# Patient Record
Sex: FEMALE | Race: WHITE | NOT HISPANIC OR LATINO | Employment: PART TIME | URBAN - METROPOLITAN AREA
[De-identification: names, ages, dates, MRNs, and addresses within clinical notes are randomized per-mention and may not be internally consistent; named-entity substitution may affect disease eponyms.]

---

## 2017-10-06 RX ORDER — FLUTICASONE PROPIONATE 50 MCG
2 SPRAY, SUSPENSION (ML) NASAL DAILY
COMMUNITY
End: 2017-10-18

## 2017-10-18 ENCOUNTER — TELEPHONE (OUTPATIENT)
Dept: FAMILY MEDICINE | Facility: CLINIC | Age: 57
End: 2017-10-18

## 2017-10-18 ENCOUNTER — OFFICE VISIT (OUTPATIENT)
Dept: FAMILY MEDICINE | Facility: CLINIC | Age: 57
End: 2017-10-18
Payer: COMMERCIAL

## 2017-10-18 VITALS
BODY MASS INDEX: 31.84 KG/M2 | SYSTOLIC BLOOD PRESSURE: 130 MMHG | DIASTOLIC BLOOD PRESSURE: 80 MMHG | OXYGEN SATURATION: 98 % | HEART RATE: 74 BPM | WEIGHT: 215 LBS | HEIGHT: 69 IN

## 2017-10-18 DIAGNOSIS — S90.811A ABRASION OF RIGHT FOOT, INITIAL ENCOUNTER: ICD-10-CM

## 2017-10-18 DIAGNOSIS — Z23 NEED FOR TETANUS BOOSTER: ICD-10-CM

## 2017-10-18 DIAGNOSIS — Z01.818 PRE-OPERATIVE CLEARANCE: Primary | ICD-10-CM

## 2017-10-18 DIAGNOSIS — M65.9: Primary | ICD-10-CM

## 2017-10-18 DIAGNOSIS — Z23 NEED FOR INFLUENZA VACCINATION: ICD-10-CM

## 2017-10-18 PROCEDURE — 90472 IMMUNIZATION ADMIN EACH ADD: CPT | Mod: ,,, | Performed by: FAMILY MEDICINE

## 2017-10-18 PROCEDURE — 90471 IMMUNIZATION ADMIN: CPT | Mod: ,,, | Performed by: FAMILY MEDICINE

## 2017-10-18 PROCEDURE — 90715 TDAP VACCINE 7 YRS/> IM: CPT | Mod: ,,, | Performed by: FAMILY MEDICINE

## 2017-10-18 PROCEDURE — 90686 IIV4 VACC NO PRSV 0.5 ML IM: CPT | Mod: ,,, | Performed by: FAMILY MEDICINE

## 2017-10-18 PROCEDURE — 99213 OFFICE O/P EST LOW 20 MIN: CPT | Mod: 25,,, | Performed by: FAMILY MEDICINE

## 2017-10-18 RX ORDER — MUPIROCIN 20 MG/G
OINTMENT TOPICAL 3 TIMES DAILY
Qty: 1 TUBE | Refills: 1 | Status: SHIPPED | OUTPATIENT
Start: 2017-10-18 | End: 2017-11-15

## 2017-10-18 NOTE — PROGRESS NOTES
Subjective:       Patient ID: Paula Hernadez is a 57 y.o. female.    Chief Complaint: thumb pain (both thumbs); Foot Swelling (rt foot); tdap; and Flu Vaccine    Months of bolateral dorsal thumb pain, no trauma, worse with flexion. Hit right foot with , slow to heal with neosporin      Review of Systems   Constitutional: Negative for appetite change, chills, diaphoresis, fatigue and fever.   HENT: Negative for congestion, ear pain, hearing loss, sore throat and trouble swallowing.    Eyes: Negative for photophobia, pain and visual disturbance.   Respiratory: Negative for cough, chest tightness and shortness of breath.    Cardiovascular: Negative for chest pain, palpitations and leg swelling.   Gastrointestinal: Negative for abdominal pain, blood in stool, constipation, diarrhea, nausea and vomiting.   Endocrine: Negative for cold intolerance and heat intolerance.   Genitourinary: Negative for difficulty urinating, flank pain, pelvic pain and vaginal pain.   Musculoskeletal: Negative for arthralgias, joint swelling, myalgias and neck pain.   Skin: Negative for rash.   Allergic/Immunologic: Negative for immunocompromised state.   Neurological: Negative for dizziness, weakness, light-headedness and headaches.   Hematological: Negative for adenopathy. Does not bruise/bleed easily.   Psychiatric/Behavioral: Negative for confusion, self-injury and suicidal ideas.       Past Medical History:   Diagnosis Date    Chronic insomnia     Neuromuscular disorder     Seasonal allergies     Seborrheic keratoses       Past Surgical History:   Procedure Laterality Date    SHOULDER SURGERY Left     TONSILLECTOMY         No family history on file.    Social History     Social History    Marital status:      Spouse name: N/A    Number of children: N/A    Years of education: N/A     Social History Main Topics    Smoking status: Former Smoker     Quit date: 10/6/2002    Smokeless tobacco: Never Used     "Alcohol use Yes    Drug use: No    Sexual activity: Yes     Other Topics Concern    None     Social History Narrative    None       Current Outpatient Prescriptions   Medication Sig Dispense Refill    mupirocin (BACTROBAN) 2 % ointment Apply topically 3 (three) times daily. 1 Tube 1     No current facility-administered medications for this visit.        Review of patient's allergies indicates:   Allergen Reactions    Codeine Nausea Only     Objective:    HPI     thumb pain    Additional comments: both thumbs           Foot Swelling    Additional comments: rt foot       Last edited by Hayley Barlow MA on 10/18/2017  1:40 PM. (History)      Blood pressure 130/80, pulse 74, height 5' 9" (1.753 m), weight 97.5 kg (215 lb), SpO2 98 %. Body mass index is 31.75 kg/m².   Physical Exam   Constitutional: She appears well-developed and well-nourished.   HENT:   Head: Normocephalic and atraumatic.   Musculoskeletal: She exhibits tenderness (positive finkelstein bilateral, no edema or erythema, no crepitus).        Feet:            Assessment:       1. Tenosynovitis of thigh    2. Abrasion of right foot, initial encounter    3. Need for influenza vaccination    4. Need for tetanus booster        Plan:       Paula was seen today for thumb pain, foot swelling, tdap and flu vaccine.    Diagnoses and all orders for this visit:    Tenosynovitis of thigh  Comments:  bilateral, splint and rest    Abrasion of right foot, initial encounter  -     mupirocin (BACTROBAN) 2 % ointment; Apply topically 3 (three) times daily.    Need for influenza vaccination  -     Influenza - Quadrivalent (3 years & older) (PF)    Need for tetanus booster  -     Tdap Vaccine         "

## 2017-10-18 NOTE — PATIENT INSTRUCTIONS
Tendonitis  A tendon is the thick fibrous cord that joins muscle to bone and allows joints to move. When a tendon becomes inflamed, it is called tendonitis. This can occur from overuse, injury, or infection. This usually involves the shoulders, forearm, wrist, hands and foot. Symptoms include pain, swelling and tenderness to the touch. Moving the joint increases the pain.  It takes 4 to 6 weeks for tendonitis to heal. It is treated by preventing motion of the tendon with a splint or brace and the use of anti-inflammatory medicine.  Home care  · Some people find relief with ice packs. These can be crushed or cubed ice in a plastic bag or a bag of frozen vegetables wrapped in a thin towel. Other people get better relief with heat. This can include a hot shower, hot bath, or a moist towel warmed in a microwave. Try each and use the method that feels best, for 15 to 20 minutes several times a day.  · Rest the inflamed joint and protect it from movement.  · You may use over-the-counter ibuprofen or naproxen to treat pain and inflammation, unless another medicine was prescribed. If you can't take these medicines, acetaminophen may help with the pain, but does not treat inflammation. If you have chronic liver or kidney disease or ever had a stomach ulcer or gastrointestinal bleeding, talk with your doctor before using these medicines.  · As your symptoms improve, begin gradual motion at the involved joint.  Follow-up care  Follow up with your healthcare provider if you are not improving after 5 days of treatment.  When to seek medical advice  Call your healthcare provider right away if any of these occur:  · Redness over the painful area  · Increasing pain or swelling at the joint  · Fever (1 degree above your normal temperature) lasting 24 to 48 hours Or, whatever your healthcare provider told you to report based on your condition  Date Last Reviewed: 11/21/2015  © 8483-4138 [employer]. [address] [place] [address]  Road, [place] [address]. All rights reserved. This information is not intended as a substitute for professional medical care. Always follow your healthcare professional's instructions.

## 2017-11-09 ENCOUNTER — TELEPHONE (OUTPATIENT)
Dept: FAMILY MEDICINE | Facility: CLINIC | Age: 57
End: 2017-11-09

## 2017-11-09 LAB
APPEARANCE UR: CLEAR
APTT PPP: 26 SEC (ref 24–33)
BACTERIA #/AREA URNS HPF: ABNORMAL /[HPF]
BASOPHILS # BLD AUTO: 0 X10E3/UL (ref 0–0.2)
BASOPHILS NFR BLD AUTO: 1 %
BILIRUB UR QL STRIP: NEGATIVE
BUN SERPL-MCNC: 14 MG/DL (ref 6–24)
BUN/CREAT SERPL: 16 (ref 9–23)
CALCIUM SERPL-MCNC: 9.3 MG/DL (ref 8.7–10.2)
CASTS URNS MICRO: ABNORMAL
CASTS URNS QL MICRO: PRESENT /LPF
CHLORIDE SERPL-SCNC: 102 MMOL/L (ref 96–106)
CO2 SERPL-SCNC: 27 MMOL/L (ref 18–29)
COLOR UR: YELLOW
CREAT SERPL-MCNC: 0.86 MG/DL (ref 0.57–1)
EOSINOPHIL # BLD AUTO: 0.3 X10E3/UL (ref 0–0.4)
EOSINOPHIL NFR BLD AUTO: 6 %
EPI CELLS #/AREA URNS HPF: ABNORMAL /HPF
ERYTHROCYTE [DISTWIDTH] IN BLOOD BY AUTOMATED COUNT: 14.9 % (ref 12.3–15.4)
GFR SERPLBLD CREATININE-BSD FMLA CKD-EPI: 75 ML/MIN/1.73
GFR SERPLBLD CREATININE-BSD FMLA CKD-EPI: 87 ML/MIN/1.73
GLUCOSE SERPL-MCNC: 89 MG/DL (ref 65–99)
GLUCOSE UR QL: NEGATIVE
HCT VFR BLD AUTO: 41.4 % (ref 34–46.6)
HGB BLD-MCNC: 13.5 G/DL (ref 11.1–15.9)
HGB UR QL STRIP: NEGATIVE
IMM GRANULOCYTES # BLD: 0 X10E3/UL (ref 0–0.1)
IMM GRANULOCYTES NFR BLD: 0 %
INR PPP: 1 (ref 0.8–1.2)
KETONES UR QL STRIP: NEGATIVE
LEUKOCYTE ESTERASE UR QL STRIP: NEGATIVE
LYMPHOCYTES # BLD AUTO: 1.3 X10E3/UL (ref 0.7–3.1)
LYMPHOCYTES NFR BLD AUTO: 31 %
MCH RBC QN AUTO: 29.3 PG (ref 26.6–33)
MCHC RBC AUTO-ENTMCNC: 32.6 G/DL (ref 31.5–35.7)
MCV RBC AUTO: 90 FL (ref 79–97)
MICRO URNS: ABNORMAL
MICRO URNS: ABNORMAL
MONOCYTES # BLD AUTO: 0.4 X10E3/UL (ref 0.1–0.9)
MONOCYTES NFR BLD AUTO: 8 %
MUCOUS THREADS URNS QL MICRO: PRESENT
NEUTROPHILS # BLD AUTO: 2.3 X10E3/UL (ref 1.4–7)
NEUTROPHILS NFR BLD AUTO: 54 %
NITRITE UR QL STRIP: NEGATIVE
PH UR STRIP: 8 [PH] (ref 5–7.5)
PLATELET # BLD AUTO: 203 X10E3/UL (ref 150–379)
POTASSIUM SERPL-SCNC: 4.8 MMOL/L (ref 3.5–5.2)
PROT UR QL STRIP: ABNORMAL
PROTHROMBIN TIME: 10.3 SEC (ref 9.1–12)
RBC # BLD AUTO: 4.6 X10E6/UL (ref 3.77–5.28)
RBC #/AREA URNS HPF: ABNORMAL /HPF
SODIUM SERPL-SCNC: 142 MMOL/L (ref 134–144)
SP GR UR: 1.02 (ref 1–1.03)
URINALYSIS REFLEX: ABNORMAL
UROBILINOGEN UR STRIP-MCNC: 0.2 MG/DL (ref 0.2–1)
WBC # BLD AUTO: 4.3 X10E3/UL (ref 3.4–10.8)
WBC #/AREA URNS HPF: ABNORMAL /HPF

## 2017-11-09 NOTE — TELEPHONE ENCOUNTER
----- Message from Genoveva Mauro sent at 11/8/2017  9:02 AM CST -----  This patient is on your schedule for pre op on 11/15; FYI; do you have everything you need for this pre op? lm

## 2017-11-15 ENCOUNTER — OFFICE VISIT (OUTPATIENT)
Dept: FAMILY MEDICINE | Facility: CLINIC | Age: 57
End: 2017-11-15
Payer: COMMERCIAL

## 2017-11-15 VITALS
BODY MASS INDEX: 31.99 KG/M2 | HEART RATE: 68 BPM | HEIGHT: 69 IN | WEIGHT: 216 LBS | OXYGEN SATURATION: 98 % | SYSTOLIC BLOOD PRESSURE: 136 MMHG | DIASTOLIC BLOOD PRESSURE: 80 MMHG

## 2017-11-15 DIAGNOSIS — Z01.818 PREOP EXAMINATION: ICD-10-CM

## 2017-11-15 DIAGNOSIS — E65 ABDOMINAL PANNUS: Primary | ICD-10-CM

## 2017-11-15 PROCEDURE — 99213 OFFICE O/P EST LOW 20 MIN: CPT | Mod: ,,, | Performed by: FAMILY MEDICINE

## 2017-11-15 NOTE — PROGRESS NOTES
Subjective:       Patient ID: Paula Hernadez is a 57 y.o. female.    Chief Complaint: Pre-op Exam (tummy tuck)    28th, [place]      Review of Systems   Constitutional: Negative for appetite change, chills, diaphoresis, fatigue and fever.   HENT: Negative for congestion, ear pain, hearing loss, sore throat and trouble swallowing.    Eyes: Negative for photophobia, pain and visual disturbance.   Respiratory: Negative for cough, chest tightness and shortness of breath.    Cardiovascular: Negative for chest pain, palpitations and leg swelling.   Gastrointestinal: Negative for abdominal pain, blood in stool, constipation, diarrhea, nausea and vomiting.   Endocrine: Negative for cold intolerance and heat intolerance.   Genitourinary: Negative for difficulty urinating, flank pain, pelvic pain and vaginal pain.   Musculoskeletal: Negative for arthralgias and myalgias.   Skin: Negative for rash.   Allergic/Immunologic: Negative for immunocompromised state.   Neurological: Negative for dizziness, weakness, light-headedness and headaches.   Hematological: Negative for adenopathy. Does not bruise/bleed easily.   Psychiatric/Behavioral: Negative for confusion, self-injury and suicidal ideas.       Past Medical History:   Diagnosis Date    Chronic insomnia     Neuromuscular disorder     Seasonal allergies     Seborrheic keratoses       Past Surgical History:   Procedure Laterality Date    SHOULDER SURGERY Left     TONSILLECTOMY         No family history on file.    Social History     Social History    Marital status:      Spouse name: N/A    Number of children: N/A    Years of education: N/A     Social History Main Topics    Smoking status: Former Smoker     Quit date: 10/6/2002    Smokeless tobacco: Never Used    Alcohol use Yes    Drug use: No    Sexual activity: Yes     Other Topics Concern    None     Social History Narrative    None       No current outpatient prescriptions on file.     No current  "facility-administered medications for this visit.        Review of patient's allergies indicates:   Allergen Reactions    Codeine Nausea Only     Objective:    HPI     Pre-op Exam    Additional comments: tummy tuck       Last edited by Hayley Barlow MA on 11/15/2017  2:46 PM. (History)      Blood pressure 136/80, pulse 68, height 5' 9" (1.753 m), weight 98 kg (216 lb), SpO2 98 %. Body mass index is 31.9 kg/m².   Physical Exam   Constitutional: She is oriented to person, place, and time. She appears well-developed and well-nourished. She is cooperative. No distress.   HENT:   Head: Normocephalic and atraumatic.   Right Ear: Tympanic membrane normal.   Left Ear: Tympanic membrane normal.   Eyes: Conjunctivae, EOM and lids are normal. Pupils are equal, round, and reactive to light. Lids are everted and swept, no foreign bodies found. Right pupil is round and reactive. Left pupil is round and reactive.   Neck: Trachea normal and normal range of motion. Neck supple.   Cardiovascular: Normal rate, regular rhythm, S1 normal, S2 normal, normal heart sounds and intact distal pulses.    Pulmonary/Chest: Breath sounds normal.   Abdominal: Soft. Bowel sounds are normal. There is no rigidity and no guarding.   Musculoskeletal: Normal range of motion.   Lymphadenopathy:     She has no cervical adenopathy.     She has no axillary adenopathy.   Neurological: She is alert and oriented to person, place, and time.   Skin: Skin is warm and dry. Capillary refill takes less than 2 seconds.   Psychiatric: She has a normal mood and affect. Her behavior is normal. Judgment and thought content normal.   Nursing note and vitals reviewed.          Assessment:       1. Abdominal pannus    2. Preop examination        Plan:       Paula was seen today for pre-op exam.    Diagnoses and all orders for this visit:    Abdominal pannus    Preop examination  -     IN OFFICE EKG 12-LEAD (to Muse)  ekg nsr, labs reviewed, cleared for surgery, low " risk

## 2017-11-27 ENCOUNTER — TELEPHONE (OUTPATIENT)
Dept: FAMILY MEDICINE | Facility: CLINIC | Age: 57
End: 2017-11-27

## 2017-11-27 NOTE — TELEPHONE ENCOUNTER
----- Message from Genoveva Mauro sent at 11/27/2017  9:11 AM CST -----  Contact: Rod at Dr. Kanika West's office; [phone]/fax [phone]  Dr. West's office looking for pre op clearance documentation/  Pt is schedule for surgery tomorrow.  Please send.  Thanks.  lm

## 2018-01-10 ENCOUNTER — OFFICE VISIT (OUTPATIENT)
Dept: FAMILY MEDICINE | Facility: CLINIC | Age: 58
End: 2018-01-10
Payer: COMMERCIAL

## 2018-01-10 VITALS
TEMPERATURE: 98 F | RESPIRATION RATE: 18 BRPM | OXYGEN SATURATION: 98 % | WEIGHT: 212.38 LBS | SYSTOLIC BLOOD PRESSURE: 118 MMHG | HEART RATE: 66 BPM | HEIGHT: 69 IN | DIASTOLIC BLOOD PRESSURE: 72 MMHG | BODY MASS INDEX: 31.45 KG/M2

## 2018-01-10 DIAGNOSIS — J30.2 ACUTE SEASONAL ALLERGIC RHINITIS, UNSPECIFIED TRIGGER: ICD-10-CM

## 2018-01-10 DIAGNOSIS — K21.9 GASTROESOPHAGEAL REFLUX DISEASE WITHOUT ESOPHAGITIS: Primary | ICD-10-CM

## 2018-01-10 PROBLEM — G62.9 NEUROPATHY: Status: ACTIVE | Noted: 2018-01-10

## 2018-01-10 PROBLEM — G47.00 INSOMNIA, PERSISTENT: Status: ACTIVE | Noted: 2018-01-10

## 2018-01-10 PROBLEM — L82.1 BASAL CELL PAPILLOMA: Status: ACTIVE | Noted: 2018-01-10

## 2018-01-10 PROBLEM — J30.9 ALLERGIC RHINITIS: Status: ACTIVE | Noted: 2018-01-10

## 2018-01-10 PROCEDURE — 99213 OFFICE O/P EST LOW 20 MIN: CPT | Mod: ,,, | Performed by: NURSE PRACTITIONER

## 2018-01-10 RX ORDER — FLUTICASONE PROPIONATE 50 MCG
2 SPRAY, SUSPENSION (ML) NASAL DAILY
Qty: 1 BOTTLE | Refills: 3 | Status: SHIPPED | OUTPATIENT
Start: 2018-01-10 | End: 2018-05-23 | Stop reason: ALTCHOICE

## 2018-01-10 RX ORDER — OMEPRAZOLE 20 MG/1
20 CAPSULE, DELAYED RELEASE ORAL DAILY
Qty: 30 CAPSULE | Refills: 0 | Status: SHIPPED | OUTPATIENT
Start: 2018-01-10 | End: 2018-02-20 | Stop reason: SDUPTHER

## 2018-01-10 NOTE — PATIENT INSTRUCTIONS
Discharge Instructions for Gastroesophageal Reflux Disease (GERD)  Gastroesophageal reflux disease (GERD) is a backflow of acid from the stomach into the swallowing tube (esophagus).  Home care  These home care steps can help you manage GERD:  · Maintain a healthy weight. Get help to lose any extra pounds.  · Avoid lying down after meals.  · Avoid eating late at night.  · Elevate the head of your bed by 6 inches. You can do this by placing wooden blocks or bed risers under the head of your bed.  · Avoid wearing tight-fitting clothes.  · Avoid foods that might irritate your stomach, such as the following:  ¨ Alcohol  ¨ Fat  ¨ Chocolate  ¨ Caffeine  ¨ Spearmint or peppermint  · Talk to your healthcare provider if you are taking any of the following medicines. These medicines can make GERD symptoms worse:  ¨ Calcium channel blockers  ¨ Theophylline  ¨ Anticholinergic medicines, such as oxybutynin and benzatropine  · Begin an exercise program. Ask your healthcare provider how to get started. You can benefit from simple activities, such as walking or gardening.  · Break the smoking habit. Enroll in a stop-smoking program to improve your chances of success.  · Limit alcohol intake to no more than 2 drinks a day.  · Take your medicines exactly as directed. Dont skip doses.  · Avoid over-the-counter nonsteroidal anti-inflammatory medicines, such as aspirin and ibuprofen, unless recommended by your healthcare provider for certain conditions.   · If possible, avoid nitrates (heart medicines, such as nitroglycerin and isosorbide dinitrate ).  Follow-up care  Make a follow-up appointment as directed by our staff.     When to call the healthcare provider  Call your healthcare provider immediately if you have any of the following:  · Trouble swallowing  · Pain when swallowing  · Feeling of food caught in your chest or throat  · Pain in the neck, chest, or back  · Heartburn that causes you to vomit  · Vomiting blood  · Black or  tarry stools (from digested blood)  · More saliva (watering of the mouth) than usual  · Weight loss of more than 3% to 5% of your total body weight in a month  · Hoarseness or sore throat that wont go away  · Choking, coughing, or wheezing   Date Last Reviewed: 7/1/2016  © 8394-3208 [employer]. [address] [place] [address], [place] [address]. All rights reserved. This information is not intended as a substitute for professional medical care. Always follow your healthcare professional's instructions.

## 2018-01-10 NOTE — PROGRESS NOTES
SUBJECTIVE:   HPI:  Sore Throat; nasal drip; and Nasal Congestion    C/o sore throat, belching and throat clearing for over a week    Sore Throat    This is a new problem. The current episode started 1 to 4 weeks ago. The problem has been unchanged. Neither side of throat is experiencing more pain than the other. There has been no fever. The patient is experiencing no pain. Pertinent negatives include no ear discharge, stridor, trouble swallowing or vomiting.       (Not in a hospital admission)  Review of patient's allergies indicates:   Allergen Reactions    Codeine Nausea Only     No current outpatient prescriptions on file prior to visit.     No current facility-administered medications on file prior to visit.      Past Medical History:   Diagnosis Date    Chronic insomnia     Neuromuscular disorder     Seasonal allergies     Seborrheic keratoses      Past Surgical History:   Procedure Laterality Date    COSMETIC SURGERY      SHOULDER SURGERY Left     TONSILLECTOMY       History reviewed. No pertinent family history.  Social History   Substance Use Topics    Smoking status: Former Smoker     Quit date: 10/6/2002    Smokeless tobacco: Never Used    Alcohol use Yes      Health Maintenance Topics with due status: Not Due       Topic Last Completion Date    Colonoscopy 04/28/2010    Lipid Panel 05/13/2015    Pap Smear with HPV Cotest 06/20/2017    Mammogram 06/26/2017    TETANUS VACCINE 10/18/2017     Immunization History   Administered Date(s) Administered    Influenza - Quadrivalent - PF 10/18/2017    Influenza - Trivalent (ADULT) 09/26/2013    Tdap 10/18/2017       Review of Systems   Constitutional: Negative for appetite change, chills, diaphoresis and unexpected weight change.   HENT: Positive for sore throat. Negative for ear discharge, hearing loss, trouble swallowing and voice change.    Eyes: Negative for photophobia and pain.   Respiratory: Negative for chest tightness and stridor.   "  Cardiovascular: Negative for chest pain.   Gastrointestinal: Negative for blood in stool and vomiting.   Endocrine: Negative for cold intolerance and heat intolerance.   Genitourinary: Negative for difficulty urinating and flank pain.   Musculoskeletal: Negative for joint swelling and neck stiffness.   Skin: Negative for pallor.   Neurological: Negative for speech difficulty.   Hematological: Does not bruise/bleed easily.   Psychiatric/Behavioral: Negative for confusion.      OBJECTIVE:      Vitals:    01/10/18 1337   BP: 118/72   Pulse: 66   Resp: 18   Temp: 98.3 °F (36.8 °C)   TempSrc: Oral   SpO2: 98%   Weight: 96.3 kg (212 lb 6.4 oz)   Height: 5' 9" (1.753 m)     Physical Exam   Constitutional: She is oriented to person, place, and time. She appears well-developed and well-nourished.   HENT:   Head: Atraumatic.   Right Ear: Tympanic membrane normal.   Left Ear: Tympanic membrane normal.   Nose: Mucosal edema: turbinates erythematous and swollen.   Mouth/Throat: Uvula is midline. Posterior oropharyngeal erythema present. No tonsillar exudate.   Eyes: Conjunctivae are normal.   Neck: Neck supple.   Cardiovascular: Normal rate, regular rhythm, normal heart sounds and intact distal pulses.    Pulmonary/Chest: Effort normal and breath sounds normal. She has no wheezes. She has no rhonchi. She has no rales.   Abdominal: Soft. Bowel sounds are normal. She exhibits no distension. There is no tenderness.   Musculoskeletal: Normal range of motion.   Lymphadenopathy:     She has no cervical adenopathy.   Neurological: She is alert and oriented to person, place, and time.   Skin: Skin is warm and dry. No rash noted.   Psychiatric: She has a normal mood and affect.   Nursing note and vitals reviewed.     Assessment:       1. Gastroesophageal reflux disease without esophagitis    2. Acute seasonal allergic rhinitis, unspecified trigger        Plan:       Gastroesophageal reflux disease without esophagitis  -     omeprazole " (PRILOSEC) 20 MG capsule; Take 1 capsule (20 mg total) by mouth once daily.  Dispense: 30 capsule; Refill: 0    Acute seasonal allergic rhinitis, unspecified trigger  -     fluticasone (FLONASE) 50 mcg/actuation nasal spray; 2 sprays (100 mcg total) by Each Nare route once daily.  Dispense: 1 Bottle; Refill: 3  -     dextromethorphan-guaifenesin  mg (MUCINEX DM)  mg per 12 hr tablet; Take 1 tablet by mouth 2 (two) times daily.  Dispense: 20 tablet; Refill: 0        Return if symptoms worsen or fail to improve.      1/10/2018 CATINA Gay, MARIELP

## 2018-02-20 DIAGNOSIS — K21.9 GASTROESOPHAGEAL REFLUX DISEASE WITHOUT ESOPHAGITIS: ICD-10-CM

## 2018-02-20 RX ORDER — OMEPRAZOLE 20 MG/1
20 CAPSULE, DELAYED RELEASE ORAL DAILY
Qty: 30 CAPSULE | Refills: 1 | Status: SHIPPED | OUTPATIENT
Start: 2018-02-20 | End: 2018-03-27 | Stop reason: SDUPTHER

## 2018-03-27 DIAGNOSIS — K21.9 GASTROESOPHAGEAL REFLUX DISEASE WITHOUT ESOPHAGITIS: ICD-10-CM

## 2018-03-27 RX ORDER — OMEPRAZOLE 20 MG/1
20 CAPSULE, DELAYED RELEASE ORAL DAILY
Qty: 90 CAPSULE | Refills: 1 | Status: SHIPPED | OUTPATIENT
Start: 2018-03-27 | End: 2018-09-27 | Stop reason: SDUPTHER

## 2018-05-23 ENCOUNTER — OFFICE VISIT (OUTPATIENT)
Dept: FAMILY MEDICINE | Facility: CLINIC | Age: 58
End: 2018-05-23
Payer: COMMERCIAL

## 2018-05-23 VITALS
TEMPERATURE: 99 F | DIASTOLIC BLOOD PRESSURE: 62 MMHG | BODY MASS INDEX: 31.99 KG/M2 | OXYGEN SATURATION: 99 % | SYSTOLIC BLOOD PRESSURE: 102 MMHG | HEART RATE: 82 BPM | WEIGHT: 216 LBS | HEIGHT: 69 IN

## 2018-05-23 DIAGNOSIS — Z00.00 PREVENTATIVE HEALTH CARE: ICD-10-CM

## 2018-05-23 DIAGNOSIS — J32.9 RHINOSINUSITIS: Primary | ICD-10-CM

## 2018-05-23 PROCEDURE — 3008F BODY MASS INDEX DOCD: CPT | Mod: ,,, | Performed by: NURSE PRACTITIONER

## 2018-05-23 PROCEDURE — 99213 OFFICE O/P EST LOW 20 MIN: CPT | Mod: ,,, | Performed by: NURSE PRACTITIONER

## 2018-05-23 RX ORDER — AZITHROMYCIN 250 MG/1
250 TABLET, FILM COATED ORAL DAILY
Qty: 6 TABLET | Refills: 0 | Status: SHIPPED | OUTPATIENT
Start: 2018-05-23 | End: 2018-06-28

## 2018-05-23 RX ORDER — BROMPHENIRAMINE MALEATE, PSEUDOEPHEDRINE HYDROCHLORIDE, AND DEXTROMETHORPHAN HYDROBROMIDE 2; 30; 10 MG/5ML; MG/5ML; MG/5ML
10 SYRUP ORAL EVERY 6 HOURS
Qty: 118 ML | Refills: 0 | Status: SHIPPED | OUTPATIENT
Start: 2018-05-23 | End: 2018-06-28

## 2018-05-23 NOTE — PATIENT INSTRUCTIONS

## 2018-05-25 ENCOUNTER — TELEPHONE (OUTPATIENT)
Dept: FAMILY MEDICINE | Facility: CLINIC | Age: 58
End: 2018-05-25

## 2018-05-25 NOTE — TELEPHONE ENCOUNTER
Pt said she woke up this morning with a mild rash on her chest. She is asking if this could be an allergic reaction to the new med.

## 2018-05-25 NOTE — TELEPHONE ENCOUNTER
JOE Ball LPN   Caller: Unspecified (Today,  9:59 AM)             Is she taking the cough med or did she start the antibiotic

## 2018-06-21 ENCOUNTER — TELEPHONE (OUTPATIENT)
Dept: FAMILY MEDICINE | Facility: CLINIC | Age: 58
End: 2018-06-21

## 2018-06-21 LAB
ALBUMIN SERPL-MCNC: 4.5 G/DL (ref 3.5–5.5)
ALBUMIN/GLOB SERPL: 1.8 {RATIO} (ref 1.2–2.2)
ALP SERPL-CCNC: 85 IU/L (ref 39–117)
ALT SERPL-CCNC: 16 IU/L (ref 0–32)
AST SERPL-CCNC: 30 IU/L (ref 0–40)
BILIRUB SERPL-MCNC: 0.5 MG/DL (ref 0–1.2)
BUN SERPL-MCNC: 17 MG/DL (ref 6–24)
BUN/CREAT SERPL: 22 (ref 9–23)
CALCIUM SERPL-MCNC: 9.4 MG/DL (ref 8.7–10.2)
CHLORIDE SERPL-SCNC: 101 MMOL/L (ref 96–106)
CHOLEST SERPL-MCNC: 200 MG/DL (ref 100–199)
CO2 SERPL-SCNC: 25 MMOL/L (ref 20–29)
CREAT SERPL-MCNC: 0.79 MG/DL (ref 0.57–1)
GFR SERPLBLD CREATININE-BSD FMLA CKD-EPI: 83 ML/MIN/1.73
GFR SERPLBLD CREATININE-BSD FMLA CKD-EPI: 95 ML/MIN/1.73
GLOBULIN SER CALC-MCNC: 2.5 G/DL (ref 1.5–4.5)
GLUCOSE SERPL-MCNC: 89 MG/DL (ref 65–99)
HDLC SERPL-MCNC: 88 MG/DL
LDLC SERPL CALC-MCNC: 92 MG/DL (ref 0–99)
POTASSIUM SERPL-SCNC: 4.6 MMOL/L (ref 3.5–5.2)
PROT SERPL-MCNC: 7 G/DL (ref 6–8.5)
SODIUM SERPL-SCNC: 139 MMOL/L (ref 134–144)
TRIGL SERPL-MCNC: 102 MG/DL (ref 0–149)
VLDLC SERPL CALC-MCNC: 20 MG/DL (ref 5–40)

## 2018-06-21 NOTE — TELEPHONE ENCOUNTER
----- Message from Mono Reaves NP sent at 6/21/2018  8:16 AM CDT -----  Cmp, lipid test ok. Pt needed these for biometric paperwork

## 2018-06-28 ENCOUNTER — OFFICE VISIT (OUTPATIENT)
Dept: ORTHOPEDICS | Facility: CLINIC | Age: 58
End: 2018-06-28
Payer: COMMERCIAL

## 2018-06-28 VITALS
HEIGHT: 69 IN | SYSTOLIC BLOOD PRESSURE: 130 MMHG | HEART RATE: 70 BPM | WEIGHT: 215 LBS | BODY MASS INDEX: 31.84 KG/M2 | DIASTOLIC BLOOD PRESSURE: 80 MMHG

## 2018-06-28 DIAGNOSIS — M18.12 ARTHRITIS OF CARPOMETACARPAL (CMC) JOINT OF LEFT THUMB: ICD-10-CM

## 2018-06-28 DIAGNOSIS — M19.041 OSTEOARTHRITIS OF HANDS, BILATERAL: Primary | ICD-10-CM

## 2018-06-28 DIAGNOSIS — M19.042 OSTEOARTHRITIS OF HANDS, BILATERAL: Primary | ICD-10-CM

## 2018-06-28 DIAGNOSIS — M18.11 ARTHRITIS OF CARPOMETACARPAL (CMC) JOINT OF RIGHT THUMB: ICD-10-CM

## 2018-06-28 PROCEDURE — 73130 X-RAY EXAM OF HAND: CPT | Mod: 50,,, | Performed by: ORTHOPAEDIC SURGERY

## 2018-06-28 PROCEDURE — 99203 OFFICE O/P NEW LOW 30 MIN: CPT | Mod: 25,,, | Performed by: ORTHOPAEDIC SURGERY

## 2018-06-28 PROCEDURE — 3008F BODY MASS INDEX DOCD: CPT | Mod: ,,, | Performed by: ORTHOPAEDIC SURGERY

## 2018-06-28 PROCEDURE — 20600 DRAIN/INJ JOINT/BURSA W/O US: CPT | Mod: 50,,, | Performed by: ORTHOPAEDIC SURGERY

## 2018-06-28 RX ORDER — PHENTERMINE HYDROCHLORIDE 37.5 MG/1
37.5 TABLET ORAL EVERY MORNING
Refills: 3 | COMMUNITY
Start: 2018-06-26 | End: 2018-06-28

## 2018-06-28 RX ORDER — METHYLPREDNISOLONE ACETATE 40 MG/ML
40 INJECTION, SUSPENSION INTRA-ARTICULAR; INTRALESIONAL; INTRAMUSCULAR; SOFT TISSUE
Status: DISCONTINUED | OUTPATIENT
Start: 2018-06-28 | End: 2018-06-28 | Stop reason: HOSPADM

## 2018-06-28 RX ADMIN — METHYLPREDNISOLONE ACETATE 40 MG: 40 INJECTION, SUSPENSION INTRA-ARTICULAR; INTRALESIONAL; INTRAMUSCULAR; SOFT TISSUE at 12:06

## 2018-06-28 NOTE — PROGRESS NOTES
[facility]    Subjective:     Chief Complaint:   Chief Complaint   Patient presents with    Left Hand - Pain     Left hand thumb pain x 10 months. No injury    Right Hand - Pain     Right hand thumb pain x 10 months. No injury       Past Medical History:   Diagnosis Date    Chronic insomnia     Neuromuscular disorder     Seasonal allergies     Seborrheic keratoses        Past Surgical History:   Procedure Laterality Date    COSMETIC SURGERY      SHOULDER SURGERY Left     TONSILLECTOMY         Current Outpatient Prescriptions   Medication Sig    omeprazole (PRILOSEC) 20 MG capsule Take 1 capsule (20 mg total) by mouth once daily.     No current facility-administered medications for this visit.        Review of patient's allergies indicates:   Allergen Reactions    Codeine Nausea Only       History reviewed. No pertinent family history.    Social History     Social History    Marital status:      Spouse name: N/A    Number of children: N/A    Years of education: N/A     Occupational History    Not on file.     Social History Main Topics    Smoking status: Former Smoker     Quit date: 10/6/2002    Smokeless tobacco: Never Used    Alcohol use Yes    Drug use: No    Sexual activity: Yes     Other Topics Concern    Not on file     Social History Narrative    No narrative on file       History of present illness: Patient is a left-handed female comes in today with bilateral thumb pain. The pain is the base of the thumbs. It's been going on for about 6 months. It's getting worse. The left is somewhat use in the right but both bother her.      Review of Systems:    Constitution: Negative for chills, fever, and sweats.  Negative for unexplained weight loss.    HENT:  Negative for headaches and blurry vision.    Cardiovascular:Negative for chest pain or irregular heart beat. Negative for hypertension.    Respiratory:  Negative for cough and shortness of breath.    Gastrointestinal:  Negative for abdominal pain, heartburn, melena, nausea, and vomitting.    Genitourinary:  Negative bladder incontinence and dysuria.    Musculoskeletal:  See HPI for details.     Neurological: Negative for numbness.    Psychiatric/Behavioral: Negative for depression.  The patient is not nervous/anxious.      Endocrine: Negative for polyuria    Hematologic/Lymphatic: Negative for bleeding problem.  Does not bruise/bleed easily.    Skin: Negative for poor would healing and rash    Objective:      Physical Examination:    Vital Signs:    Vitals:    06/28/18 1117   BP: 130/80   Pulse: 70       Body mass index is 31.75 kg/m².    This a well-developed, well nourished patient in no acute distress.  They are alert and oriented and cooperative to examination.        Patient has significant discomfort with CMC grind's on the left. Negative Finkelstein's negative Tinel's. Full range of motion of the right hand and thumb. Significant discomfort with CMC grind's on the right side.  Pertinent New Results:    XRAY Report / Interpretation:   AP lateral and obliques of her bilateral hands done in the office today demonstrate significant bilateral basilar osteoarthritis with loss of the trapezial metacarpal joint and spurring of the trapeziometacarpal joint bilaterally    Assessment/Plan:      Severe basilar arthritis bilaterally. I injected both basilar joints with Depo-Medrol and lidocaine. She will follow-up in a month      This note was created using Dragon voice recognition software that occasionally misinterpreted phrases or words.

## 2018-06-28 NOTE — PROCEDURES
Small Joint Aspiration/Injection  Date/Time: 6/28/2018 12:07 PM  Performed by: ROCIO VALDEZ  Authorized by: ROCIO VALDEZ     Consent Done?:  Yes (Verbal)  Indications:  Pain  Site marked: The procedure site was marked    Timeout: Prior to procedure the correct patient, procedure, and site was verified      Location:  Thumb  Site:  R thumb MCP  Prep: Patient was prepped and draped in usual sterile fashion    Ultrasonic Guidance for needle placement: No  Needle size:  25 G  Medications:  40 mg methylPREDNISolone acetate 40 mg/mL  Patient tolerance:  Patient tolerated the procedure well with no immediate complications  Small Joint Aspiration/Injection  Date/Time: 6/28/2018 12:07 PM  Performed by: ROCIO VALDEZ  Authorized by: ROCIO VALDEZ     Consent Done?:  Yes (Verbal)  Indications:  Pain  Site marked: The procedure site was marked    Timeout: Prior to procedure the correct patient, procedure, and site was verified      Location:  Thumb  Site:  L thumb MCP  Prep: Patient was prepped and draped in usual sterile fashion    Ultrasonic Guidance for needle placement: No  Needle size:  25 G  Medications:  40 mg methylPREDNISolone acetate 40 mg/mL  Patient tolerance:  Patient tolerated the procedure well with no immediate complications

## 2018-09-04 ENCOUNTER — OFFICE VISIT (OUTPATIENT)
Dept: ORTHOPEDICS | Facility: CLINIC | Age: 58
End: 2018-09-04
Payer: COMMERCIAL

## 2018-09-04 VITALS
DIASTOLIC BLOOD PRESSURE: 80 MMHG | BODY MASS INDEX: 31.84 KG/M2 | HEIGHT: 69 IN | HEART RATE: 74 BPM | WEIGHT: 215 LBS | SYSTOLIC BLOOD PRESSURE: 132 MMHG

## 2018-09-04 DIAGNOSIS — M54.12 CERVICAL RADICULOPATHY AT C5: Primary | ICD-10-CM

## 2018-09-04 DIAGNOSIS — M25.511 ACUTE PAIN OF RIGHT SHOULDER: ICD-10-CM

## 2018-09-04 PROCEDURE — 73030 X-RAY EXAM OF SHOULDER: CPT | Mod: RT,,, | Performed by: ORTHOPAEDIC SURGERY

## 2018-09-04 PROCEDURE — 72040 X-RAY EXAM NECK SPINE 2-3 VW: CPT | Mod: ,,, | Performed by: ORTHOPAEDIC SURGERY

## 2018-09-04 PROCEDURE — 20551 NJX 1 TENDON ORIGIN/INSJ: CPT | Mod: RT,,, | Performed by: ORTHOPAEDIC SURGERY

## 2018-09-04 PROCEDURE — 3008F BODY MASS INDEX DOCD: CPT | Mod: ,,, | Performed by: ORTHOPAEDIC SURGERY

## 2018-09-04 PROCEDURE — 99213 OFFICE O/P EST LOW 20 MIN: CPT | Mod: 25,,, | Performed by: ORTHOPAEDIC SURGERY

## 2018-09-04 RX ORDER — PHENTERMINE HYDROCHLORIDE 37.5 MG/1
37.5 TABLET ORAL EVERY MORNING
Refills: 3 | COMMUNITY
Start: 2018-08-28 | End: 2019-02-14

## 2018-09-04 RX ORDER — METHYLPREDNISOLONE ACETATE 40 MG/ML
40 INJECTION, SUSPENSION INTRA-ARTICULAR; INTRALESIONAL; INTRAMUSCULAR; SOFT TISSUE
Status: DISCONTINUED | OUTPATIENT
Start: 2018-09-04 | End: 2018-09-04 | Stop reason: HOSPADM

## 2018-09-04 RX ADMIN — METHYLPREDNISOLONE ACETATE 40 MG: 40 INJECTION, SUSPENSION INTRA-ARTICULAR; INTRALESIONAL; INTRAMUSCULAR; SOFT TISSUE at 01:09

## 2018-09-04 NOTE — PROCEDURES
Tendon Origin  Date/Time: 9/4/2018 1:35 PM  Performed by: Giovani Molina MD  Authorized by: Giovani Molina MD     Consent Done?:  Yes (Verbal)  Timeout: prior to procedure the correct patient, procedure, and site was verified    Indications:  Pain  Site marked: the procedure site was marked    Timeout: prior to procedure the correct patient, procedure, and site was verified    Location: Cervical area- right.  Prep: patient was prepped and draped in usual sterile fashion    Needle size:  25 G  Medications:  40 mg methylPREDNISolone acetate 40 mg/mL  Patient tolerance:  Patient tolerated the procedure well with no immediate complications

## 2018-09-04 NOTE — PROGRESS NOTES
[facility]    Subjective:     Chief Complaint:   Chief Complaint   Patient presents with    Right Shoulder - Pain     Rt shoulder pain for about 6 weeks. States her pain is really in the shoulder blade area that goes down her arm with a burning feeling. States her ROM is fine. States she does have tension in neck.        Past Medical History:   Diagnosis Date    Chronic insomnia     Neuromuscular disorder     Seasonal allergies     Seborrheic keratoses        Past Surgical History:   Procedure Laterality Date    COSMETIC SURGERY      SHOULDER SURGERY Left     TONSILLECTOMY         Current Outpatient Medications   Medication Sig    omeprazole (PRILOSEC) 20 MG capsule Take 1 capsule (20 mg total) by mouth once daily.    phentermine (ADIPEX-P) 37.5 mg tablet Take 37.5 mg by mouth every morning.     No current facility-administered medications for this visit.        Review of patient's allergies indicates:   Allergen Reactions    Codeine Nausea Only       History reviewed. No pertinent family history.    Social History     Socioeconomic History    Marital status:      Spouse name: Not on file    Number of children: Not on file    Years of education: Not on file    Highest education level: Not on file   Social Needs    Financial resource strain: Not on file    Food insecurity - worry: Not on file    Food insecurity - inability: Not on file    Transportation needs - medical: Not on file    Transportation needs - non-medical: Not on file   Occupational History    Not on file   Tobacco Use    Smoking status: Former Smoker     Last attempt to quit: 10/6/2002     Years since quitting: 15.9    Smokeless tobacco: Never Used   Substance and Sexual Activity    Alcohol use: Yes    Drug use: No    Sexual activity: Yes   Other Topics Concern    Not on file   Social History Narrative    Not on file       History of present illness: Patient comes in today for the right shoulder. Pain is  in her shoulder blade. It's been there for several weeks. She's not sure exactly why or how it started.      Review of Systems:    Constitution: Negative for chills, fever, and sweats.  Negative for unexplained weight loss.    HENT:  Negative for headaches and blurry vision.    Cardiovascular:Negative for chest pain or irregular heart beat. Negative for hypertension.    Respiratory:  Negative for cough and shortness of breath.    Gastrointestinal: Negative for abdominal pain, heartburn, melena, nausea, and vomitting.    Genitourinary:  Negative bladder incontinence and dysuria.    Musculoskeletal:  See HPI for details.     Neurological: Negative for numbness.    Psychiatric/Behavioral: Negative for depression.  The patient is not nervous/anxious.      Endocrine: Negative for polyuria    Hematologic/Lymphatic: Negative for bleeding problem.  Does not bruise/bleed easily.    Skin: Negative for poor would healing and rash    Objective:      Physical Examination:    Vital Signs:    Vitals:    09/04/18 1319   BP: 132/80   Pulse: 74       Body mass index is 31.75 kg/m².    This a well-developed, well nourished patient in no acute distress.  They are alert and oriented and cooperative to examination.        Patient is full range of motion of the right shoulder. Negative crossover negative impingement. Her rotator cuff is strong. Spurling sign is negative but she does have pain with motion of her cervical spine. She's very tender along the medial scapular border.  Pertinent New Results:    XRAY Report / Interpretation:   AP and lateral the cervical spine done today demonstrates degenerative changes at C5-6 with significant loss of the disc space at C5-6.    AP and lateral of the right shoulder demonstrated a type II acromion. No fracture subluxations or dislocations. No glenohumeral arthrosis    Assessment/Plan:      Cervical degenerative arthritis with referred pain medial scapular border. I injected her with Depo-Medrol  and lidocaine. 3 trigger points. I've started her on physical therapy. I will see him back in a month      This note was created using Dragon voice recognition software that occasionally misinterpreted phrases or words.

## 2018-09-25 ENCOUNTER — TELEPHONE (OUTPATIENT)
Dept: ORTHOPEDICS | Facility: CLINIC | Age: 58
End: 2018-09-25

## 2018-09-25 NOTE — TELEPHONE ENCOUNTER
----- Message from Hayley Wall sent at 9/25/2018  8:25 AM CDT -----  Contact: PATIENT  [facility] IS NOT ON HER INSURANCE, NEEDS NEW ORDER FOR [facility] FAX # [phone]

## 2018-09-25 NOTE — TELEPHONE ENCOUNTER
----- Message from Hayley Wall sent at 9/25/2018  8:25 AM CDT -----  Contact: PATIENT  [facility] IS NOT ON HER INSURANCE, NEEDS NEW ORDER FOR [facility] FAX # [phone]

## 2018-09-27 DIAGNOSIS — K21.9 GASTROESOPHAGEAL REFLUX DISEASE WITHOUT ESOPHAGITIS: ICD-10-CM

## 2018-09-27 RX ORDER — OMEPRAZOLE 20 MG/1
20 CAPSULE, DELAYED RELEASE ORAL DAILY
Qty: 90 CAPSULE | Refills: 1 | Status: SHIPPED | OUTPATIENT
Start: 2018-09-27 | End: 2019-03-20 | Stop reason: SDUPTHER

## 2019-01-31 ENCOUNTER — TELEPHONE (OUTPATIENT)
Dept: DERMATOLOGY | Facility: CLINIC | Age: 59
End: 2019-01-31

## 2019-01-31 NOTE — TELEPHONE ENCOUNTER
LM for patient concerning paperwork as a new patient. Advised to come to appointment 20 min early.

## 2019-01-31 NOTE — TELEPHONE ENCOUNTER
----- Message from Bernadette Tapia sent at 1/31/2019 12:55 PM CST -----  Contact: patient  Type: Needs Medical Advice    Who Called:  patient  Best Call Back Number: [phone]  Additional Information: patient is schedule for 02/14/2019 as a new patient and wants to know if there is any paperwork that has to be filled out. If so she would like for the paperwork to be faxed to her at [phone] if possible. Please advise

## 2019-02-14 ENCOUNTER — OFFICE VISIT (OUTPATIENT)
Dept: DERMATOLOGY | Facility: CLINIC | Age: 59
End: 2019-02-14
Payer: COMMERCIAL

## 2019-02-14 DIAGNOSIS — D18.01 CHERRY ANGIOMA: ICD-10-CM

## 2019-02-14 DIAGNOSIS — Z12.83 SKIN CANCER SCREENING: ICD-10-CM

## 2019-02-14 DIAGNOSIS — L81.4 SOLAR LENTIGO: ICD-10-CM

## 2019-02-14 DIAGNOSIS — L82.1 SEBORRHEIC KERATOSES: Primary | ICD-10-CM

## 2019-02-14 DIAGNOSIS — D22.39 FIBROUS PAPULE OF NOSE: ICD-10-CM

## 2019-02-14 PROCEDURE — 99203 OFFICE O/P NEW LOW 30 MIN: CPT | Mod: S$GLB,,, | Performed by: DERMATOLOGY

## 2019-02-14 PROCEDURE — 99203 PR OFFICE/OUTPT VISIT, NEW, LEVL III, 30-44 MIN: ICD-10-PCS | Mod: S$GLB,,, | Performed by: DERMATOLOGY

## 2019-02-14 PROCEDURE — 99999 PR PBB SHADOW E&M-EST. PATIENT-LVL II: ICD-10-PCS | Mod: PBBFAC,,, | Performed by: DERMATOLOGY

## 2019-02-14 PROCEDURE — 99999 PR PBB SHADOW E&M-EST. PATIENT-LVL II: CPT | Mod: PBBFAC,,, | Performed by: DERMATOLOGY

## 2019-02-14 NOTE — PROGRESS NOTES
Subjective:       Patient ID:  Paula Hernadez is a 59 y.o. female who presents for   Chief Complaint   Patient presents with    Skin Check     TBSE    Spot     R cheek     Initial visit  Endorses lifelong moderate intense recreational sun exposure  No h/o skin cancer, no FH melanoma    Requests TBSE for cancer screening  Complaint below      Spot  - Initial  Affected locations: right cheek  Duration: months.  Signs and Symptoms: raised.  Severity: mild  Timing: constant  Aggravated by: nothing  Relieving factors/Treatments tried: nothing        Review of Systems   Constitutional: Negative for fever, chills and fatigue.   Skin: Positive for daily sunscreen use, activity-related sunscreen use and wears hat.   Hematologic/Lymphatic: Bruises/bleeds easily.        Objective:    Physical Exam   Constitutional: She appears well-developed and well-nourished. No distress.   Neurological: She is alert and oriented to person, place, and time. She is not disoriented.   Psychiatric: She has a normal mood and affect.   Skin:   Areas Examined (abnormalities noted in diagram):   Scalp / Hair Palpated and Inspected  Head / Face Inspection Performed  Neck Inspection Performed  Chest / Axilla Inspection Performed  Abdomen Inspection Performed  Genitals / Buttocks / Groin Inspection Performed  Back Inspection Performed  RUE Inspected  LUE Inspection Performed  RLE Inspected  LLE Inspection Performed  Nails and Digits Inspection Performed                   Diagram Legend     Erythematous scaling macule/papule c/w actinic keratosis       Vascular papule c/w angioma      Pigmented verrucoid papule/plaque c/w seborrheic keratosis      Yellow umbilicated papule c/w sebaceous hyperplasia      Irregularly shaped tan macule c/w lentigo     1-2 mm smooth white papules consistent with Milia      Movable subcutaneous cyst with punctum c/w epidermal inclusion cyst      Subcutaneous movable cyst c/w pilar cyst      Firm pink to brown papule  c/w dermatofibroma      Pedunculated fleshy papule(s) c/w skin tag(s)      Evenly pigmented macule c/w junctional nevus     Mildly variegated pigmented, slightly irregular-bordered macule c/w mildly atypical nevus      Flesh colored to evenly pigmented papule c/w intradermal nevus       Pink pearly papule/plaque c/w basal cell carcinoma      Erythematous hyperkeratotic cursted plaque c/w SCC      Surgical scar with no sign of skin cancer recurrence      Open and closed comedones      Inflammatory papules and pustules      Verrucoid papule consistent consistent with wart     Erythematous eczematous patches and plaques     Dystrophic onycholytic nail with subungual debris c/w onychomycosis     Umbilicated papule    Erythematous-base heme-crusted tan verrucoid plaque consistent with inflamed seborrheic keratosis     Erythematous Silvery Scaling Plaque c/w Psoriasis     See annotation      Assessment / Plan:        Seborrheic keratoses  These are benign inherited growths without a malignant potential. Reassurance given to patient. No treatment is necessary.     Solar lentigo  This is a benign hyperpigmented sun induced lesion. Daily sun protection will reduce the number of new lesions. Treatment of these benign lesions are considered cosmetic.    Fibrous papule of nose  reassurance    Skin cancer screening  Total body skin examination performed today including at least 12 points as noted in physical examination. No lesions suspicious for malignancy noted.    Cherry angioma  This is a benign vascular lesion. Reassurance given. No treatment required.     Patient instructed in importance in daily sun protection of at least spf 30. Mineral sunscreen ingredients preferred (Zinc +/- Titanium).   Recommend Elta MD for daily use on face and neck.  Patient encouraged to wear hat for all outdoor exposure.   Also discussed sun avoidance and use of protective clothing.           Follow-up in about 1 year (around 2/14/2020), or if  symptoms worsen or fail to improve.

## 2019-03-20 DIAGNOSIS — K21.9 GASTROESOPHAGEAL REFLUX DISEASE WITHOUT ESOPHAGITIS: ICD-10-CM

## 2019-03-20 RX ORDER — OMEPRAZOLE 20 MG/1
CAPSULE, DELAYED RELEASE ORAL
Qty: 90 CAPSULE | Refills: 1 | Status: SHIPPED | OUTPATIENT
Start: 2019-03-20 | End: 2019-09-05

## 2019-09-05 ENCOUNTER — OFFICE VISIT (OUTPATIENT)
Dept: ORTHOPEDICS | Facility: CLINIC | Age: 59
End: 2019-09-05
Payer: COMMERCIAL

## 2019-09-05 VITALS
WEIGHT: 215 LBS | DIASTOLIC BLOOD PRESSURE: 79 MMHG | HEIGHT: 68 IN | SYSTOLIC BLOOD PRESSURE: 112 MMHG | HEART RATE: 77 BPM | BODY MASS INDEX: 32.58 KG/M2

## 2019-09-05 DIAGNOSIS — M19.042 PRIMARY OSTEOARTHRITIS OF BOTH HANDS: Primary | ICD-10-CM

## 2019-09-05 DIAGNOSIS — M18.12 ARTHRITIS OF CARPOMETACARPAL (CMC) JOINT OF LEFT THUMB: ICD-10-CM

## 2019-09-05 DIAGNOSIS — M19.041 PRIMARY OSTEOARTHRITIS OF BOTH HANDS: Primary | ICD-10-CM

## 2019-09-05 DIAGNOSIS — M18.11 ARTHRITIS OF CARPOMETACARPAL (CMC) JOINT OF RIGHT THUMB: ICD-10-CM

## 2019-09-05 PROCEDURE — 3008F BODY MASS INDEX DOCD: CPT | Mod: S$GLB,,, | Performed by: ORTHOPAEDIC SURGERY

## 2019-09-05 PROCEDURE — 3008F PR BODY MASS INDEX (BMI) DOCUMENTED: ICD-10-PCS | Mod: S$GLB,,, | Performed by: ORTHOPAEDIC SURGERY

## 2019-09-05 PROCEDURE — 20600 SMALL JOINT ASPIRATION/INJECTION: R THUMB MCP: ICD-10-PCS | Mod: 50,S$GLB,, | Performed by: ORTHOPAEDIC SURGERY

## 2019-09-05 PROCEDURE — 99213 PR OFFICE/OUTPT VISIT, EST, LEVL III, 20-29 MIN: ICD-10-PCS | Mod: 25,S$GLB,, | Performed by: ORTHOPAEDIC SURGERY

## 2019-09-05 PROCEDURE — 20600 DRAIN/INJ JOINT/BURSA W/O US: CPT | Mod: 50,S$GLB,, | Performed by: ORTHOPAEDIC SURGERY

## 2019-09-05 PROCEDURE — 99213 OFFICE O/P EST LOW 20 MIN: CPT | Mod: 25,S$GLB,, | Performed by: ORTHOPAEDIC SURGERY

## 2019-09-05 RX ORDER — PHENTERMINE HYDROCHLORIDE 37.5 MG/1
TABLET ORAL
Refills: 1 | COMMUNITY
Start: 2019-08-28 | End: 2020-03-19

## 2019-09-05 RX ORDER — METHYLPREDNISOLONE ACETATE 40 MG/ML
40 INJECTION, SUSPENSION INTRA-ARTICULAR; INTRALESIONAL; INTRAMUSCULAR; SOFT TISSUE
Status: DISCONTINUED | OUTPATIENT
Start: 2019-09-05 | End: 2019-09-05 | Stop reason: HOSPADM

## 2019-09-05 RX ORDER — OMEPRAZOLE 20 MG/1
20 CAPSULE, DELAYED RELEASE ORAL DAILY
COMMUNITY

## 2019-09-05 RX ORDER — PLECANATIDE 3 MG/1
TABLET ORAL
Refills: 2 | COMMUNITY
Start: 2019-08-29 | End: 2021-05-04

## 2019-09-05 RX ADMIN — METHYLPREDNISOLONE ACETATE 40 MG: 40 INJECTION, SUSPENSION INTRA-ARTICULAR; INTRALESIONAL; INTRAMUSCULAR; SOFT TISSUE at 01:09

## 2019-09-05 NOTE — PROCEDURES
Small Joint Aspiration/Injection: R thumb MCP  Date/Time: 9/5/2019 1:59 PM  Performed by: Giovani Molina MD  Authorized by: Giovani Molina MD     Consent Done?:  Yes (Verbal)  Indications:  Pain  Site marked: The procedure site was marked    Timeout: Prior to procedure the correct patient, procedure, and site was verified      Location:  Thumb  Site:  R thumb MCP  Prep: Patient was prepped and draped in usual sterile fashion    Ultrasonic Guidance for needle placement: No  Needle size:  25 G  Medications:  40 mg methylPREDNISolone acetate 40 mg/mL  Patient tolerance:  Patient tolerated the procedure well with no immediate complications  Small Joint Aspiration/Injection: L thumb MCP  Date/Time: 9/5/2019 1:59 PM  Performed by: Giovani Molina MD  Authorized by: Giovani Molina MD     Consent Done?:  Yes (Verbal)  Indications:  Pain  Site marked: The procedure site was marked    Timeout: Prior to procedure the correct patient, procedure, and site was verified      Location:  Thumb  Site:  L thumb MCP  Prep: Patient was prepped and draped in usual sterile fashion    Ultrasonic Guidance for needle placement: No  Needle size:  25 G  Medications:  40 mg methylPREDNISolone acetate 40 mg/mL  Patient tolerance:  Patient tolerated the procedure well with no immediate complications

## 2019-09-05 NOTE — PROGRESS NOTES
[facility]    Subjective:     Chief Complaint:   Chief Complaint   Patient presents with    Left Hand - Pain     micah thumb pain x 1 year.lposs injection ( injections done 18)     Right Hand - Pain       Past Medical History:   Diagnosis Date    Chronic insomnia     Neuromuscular disorder     Seasonal allergies     Seborrheic keratoses        Past Surgical History:   Procedure Laterality Date    COSMETIC SURGERY      SHOULDER SURGERY Left     TONSILLECTOMY         Current Outpatient Medications   Medication Sig    omeprazole (PRILOSEC) 20 MG capsule omeprazole    phentermine (ADIPEX-P) 37.5 mg tablet TAKE 1 TABLET BY MOUTH ONCE EVERY MORNING    TRULANCE 3 mg Tab TAKE 1 TABLET BY MOUTH EVERY DAY WITH MEALS     No current facility-administered medications for this visit.        Review of patient's allergies indicates:   Allergen Reactions    Codeine Nausea Only       Family History   Problem Relation Age of Onset    Melanoma Neg Hx     Psoriasis Neg Hx     Lupus Neg Hx     Eczema Neg Hx        Social History     Socioeconomic History    Marital status:      Spouse name: Not on file    Number of children: Not on file    Years of education: Not on file    Highest education level: Not on file   Occupational History    Not on file   Social Needs    Financial resource strain: Not on file    Food insecurity:     Worry: Not on file     Inability: Not on file    Transportation needs:     Medical: Not on file     Non-medical: Not on file   Tobacco Use    Smoking status: Former Smoker     Last attempt to quit: 10/6/2002     Years since quittin.9    Smokeless tobacco: Never Used   Substance and Sexual Activity    Alcohol use: Yes    Drug use: No    Sexual activity: Yes   Lifestyle    Physical activity:     Days per week: Not on file     Minutes per session: Not on file    Stress: Not on file   Relationships    Social connections:     Talks on phone: Not on file      Gets together: Not on file     Attends L[employer] day service: Not on file     Active member of club or organization: Not on file     Attends meetings of clubs or organizations: Not on file     Relationship status: Not on file   Other Topics Concern    Are you pregnant or think you may be? Not Asked    Breast-feeding Not Asked   Social History Narrative    Not on file       History of present illness: Patient comes in today for her bilateral hands. Both bother her. They're essentially symmetric. The pain is the base of her thumbs. She has had a Depo-Medrol injection the past which did help her somewhat.      Review of Systems:    Constitution: Negative for chills, fever, and sweats.  Negative for unexplained weight loss.    HENT:  Negative for headaches and blurry vision.    Cardiovascular:Negative for chest pain or irregular heart beat. Negative for hypertension.    Respiratory:  Negative for cough and shortness of breath.    Gastrointestinal: Negative for abdominal pain, heartburn, melena, nausea, and vomitting.    Genitourinary:  Negative bladder incontinence and dysuria.    Musculoskeletal:  See HPI for details.     Neurological: Negative for numbness.    Psychiatric/Behavioral: Negative for depression.  The patient is not nervous/anxious.      Endocrine: Negative for polyuria    Hematologic/Lymphatic: Negative for bleeding problem.  Does not bruise/bleed easily.    Skin: Negative for poor would healing and rash    Objective:      Physical Examination:    Vital Signs:    Vitals:    09/05/19 1305   BP: 112/79   Pulse: 77       Body mass index is 32.69 kg/m².    This a well-developed, well nourished patient in no acute distress.  They are alert and oriented and cooperative to examination.        Patient has full range of motion of her bilateral hands. She has severe pain with CMC grind's on the right and left. Negative Tinel's negative Finkelstein's. Spurling sign is negative.  Pertinent New Results:    XRAY Report  / Interpretation:   AP lateral and oblique of her bilateral hands demonstrate severe basilar arthritis on the right thumb. Severe basilar arthritis left thumb. No change compared to prior films.    Assessment/Plan:      Basilar arthritis bilateral thumbs. I injected both basilar joints with Depo-Medrol and lidocaine. Follow-up when necessary we did speak extensively about interpositional arthroplasty. She will consider interpositional arthroplasty      This note was created using Dragon voice recognition software that occasionally misinterpreted phrases or words.

## 2020-03-19 ENCOUNTER — OFFICE VISIT (OUTPATIENT)
Dept: DERMATOLOGY | Facility: CLINIC | Age: 60
End: 2020-03-19
Payer: COMMERCIAL

## 2020-03-19 DIAGNOSIS — D18.01 CHERRY ANGIOMA: ICD-10-CM

## 2020-03-19 DIAGNOSIS — R23.8 INFLAMMATORY PAPULE: Primary | ICD-10-CM

## 2020-03-19 DIAGNOSIS — L81.4 SOLAR LENTIGO: ICD-10-CM

## 2020-03-19 DIAGNOSIS — L82.1 SEBORRHEIC KERATOSES: ICD-10-CM

## 2020-03-19 DIAGNOSIS — D22.9 MULTIPLE BENIGN NEVI: ICD-10-CM

## 2020-03-19 PROCEDURE — 99214 OFFICE O/P EST MOD 30 MIN: CPT | Mod: S$GLB,,, | Performed by: DERMATOLOGY

## 2020-03-19 PROCEDURE — 99999 PR PBB SHADOW E&M-EST. PATIENT-LVL II: ICD-10-PCS | Mod: PBBFAC,,, | Performed by: DERMATOLOGY

## 2020-03-19 PROCEDURE — 99999 PR PBB SHADOW E&M-EST. PATIENT-LVL II: CPT | Mod: PBBFAC,,, | Performed by: DERMATOLOGY

## 2020-03-19 PROCEDURE — 99214 PR OFFICE/OUTPT VISIT, EST, LEVL IV, 30-39 MIN: ICD-10-PCS | Mod: S$GLB,,, | Performed by: DERMATOLOGY

## 2020-03-19 RX ORDER — IBUPROFEN 200 MG
200 TABLET ORAL EVERY 6 HOURS PRN
COMMUNITY
End: 2023-10-11

## 2020-03-19 NOTE — PROGRESS NOTES
Subjective:       Patient ID:  Paula Hernadez is a 60 y.o. female who presents for   Chief Complaint   Patient presents with    Skin Check     TBSE    Spot     Forehead, years, no sx, no tx     Pt last seen 2-14-19 for TBSE, benign lesions.    Here today for TBSE and spot on forehead for several years.  No sx, no tx.    Endorses lifelong moderate intense recreational sun exposure  No h/o skin cancer, no FH melanoma         Review of Systems   Constitutional: Negative for fever, chills and fatigue.   Skin: Positive for daily sunscreen use, activity-related sunscreen use and wears hat.   Hematologic/Lymphatic: Bruises/bleeds easily.        Objective:    Physical Exam   Constitutional: She appears well-developed and well-nourished. No distress.   Neurological: She is alert and oriented to person, place, and time. She is not disoriented.   Psychiatric: She has a normal mood and affect.   Skin:   Areas Examined (abnormalities noted in diagram):   Scalp / Hair Palpated and Inspected  Head / Face Inspection Performed  Neck Inspection Performed  Chest / Axilla Inspection Performed  Abdomen Inspection Performed  Genitals / Buttocks / Groin Inspection Performed  Back Inspection Performed  RUE Inspected  LUE Inspection Performed  RLE Inspected  LLE Inspection Performed  Nails and Digits Inspection Performed                       Diagram Legend     Erythematous scaling macule/papule c/w actinic keratosis       Vascular papule c/w angioma      Pigmented verrucoid papule/plaque c/w seborrheic keratosis      Yellow umbilicated papule c/w sebaceous hyperplasia      Irregularly shaped tan macule c/w lentigo     1-2 mm smooth white papules consistent with Milia      Movable subcutaneous cyst with punctum c/w epidermal inclusion cyst      Subcutaneous movable cyst c/w pilar cyst      Firm pink to brown papule c/w dermatofibroma      Pedunculated fleshy papule(s) c/w skin tag(s)      Evenly pigmented macule c/w junctional nevus      Mildly variegated pigmented, slightly irregular-bordered macule c/w mildly atypical nevus      Flesh colored to evenly pigmented papule c/w intradermal nevus       Pink pearly papule/plaque c/w basal cell carcinoma      Erythematous hyperkeratotic cursted plaque c/w SCC      Surgical scar with no sign of skin cancer recurrence      Open and closed comedones      Inflammatory papules and pustules      Verrucoid papule consistent consistent with wart     Erythematous eczematous patches and plaques     Dystrophic onycholytic nail with subungual debris c/w onychomycosis     Umbilicated papule    Erythematous-base heme-crusted tan verrucoid plaque consistent with inflamed seborrheic keratosis     Erythematous Silvery Scaling Plaque c/w Psoriasis     See annotation      Assessment / Plan:        Inflammatory papule  Forehead  Discussed ILK, declines  Favor self resolving inflammatory papule  No features of malignancy    Seborrheic keratoses  These are benign inherited growths without a malignant potential. Reassurance given to patient. No treatment is necessary.     Solar lentigo  This is a benign hyperpigmented sun induced lesion. Daily sun protection will reduce the number of new lesions. Treatment of these benign lesions are considered cosmetic.    Multiple benign nevi  Monitor for new mole or moles that are becoming bigger, darker, irritated, or developing irregular borders.    Cherry angioma  This is a benign vascular lesion. Reassurance given. No treatment required.     Patient instructed in importance in daily sun protection of at least spf 30. Mineral sunscreen ingredients preferred (Zinc +/- Titanium).   Recommend Elta MD for daily use on face and neck.  Patient encouraged to wear hat for all outdoor exposure.   Also discussed sun avoidance and use of protective clothing.         Follow up in about 1 year (around 3/19/2021), or if symptoms worsen or fail to improve.

## 2020-04-28 ENCOUNTER — PATIENT MESSAGE (OUTPATIENT)
Dept: FAMILY MEDICINE | Facility: CLINIC | Age: 60
End: 2020-04-28

## 2020-10-06 ENCOUNTER — OFFICE VISIT (OUTPATIENT)
Dept: SURGERY | Facility: CLINIC | Age: 60
End: 2020-10-06
Payer: COMMERCIAL

## 2020-10-06 VITALS
WEIGHT: 216 LBS | TEMPERATURE: 97 F | BODY MASS INDEX: 32.74 KG/M2 | DIASTOLIC BLOOD PRESSURE: 70 MMHG | HEART RATE: 80 BPM | SYSTOLIC BLOOD PRESSURE: 136 MMHG | HEIGHT: 68 IN | RESPIRATION RATE: 20 BRPM

## 2020-10-06 DIAGNOSIS — R92.8 ABNORMAL MAMMOGRAM OF LEFT BREAST: Primary | ICD-10-CM

## 2020-10-06 PROCEDURE — 99243 PR OFFICE CONSULTATION,LEVEL III: ICD-10-PCS | Mod: S$GLB,,, | Performed by: SURGERY

## 2020-10-06 PROCEDURE — 99243 OFF/OP CNSLTJ NEW/EST LOW 30: CPT | Mod: S$GLB,,, | Performed by: SURGERY

## 2020-10-06 RX ORDER — PHENTERMINE HYDROCHLORIDE 37.5 MG/1
37.5 TABLET ORAL EVERY MORNING
COMMUNITY
Start: 2020-08-05 | End: 2021-05-27

## 2020-10-06 NOTE — LETTER
October 7, 2020      Kevin Sanford MD  [address]  [place] [address]           [facility]  [address]  [place] [address]  Phone: [phone]  Fax: [phone]          Patient: Paula Hernadez   MR Number: [id number]   YOB: 1960   Date of Visit: 10/6/2020       Dear Dr. Kevin Sanford:    Thank you for referring Paula Hernadez to me for evaluation. Attached you will find relevant portions of my assessment and plan of care.    If you have questions, please do not hesitate to call me. I look forward to following Paula Hernadez along with you.    Sincerely,    Philipp Tapia III, MD    Enclosure  CC:  No Recipients    If you would like to receive this communication electronically, please contact [email or web address] or [phone] to request more information on [facility] Link access.    For providers and/or their staff who would like to refer a patient to [facility], please contact us through our one-stop-shop provider referral line, [facility], at [phone].    If you feel you have received this communication in error or would no longer like to receive these types of communications, please e-mail [email or web address]

## 2020-10-06 NOTE — PROGRESS NOTES
Subjective:       Patient ID: Paula Hernadez is a 60 y.o. female.    Chief Complaint: Other (Eval Abn Mammo)      HPI:  Patient is 60 year old female referred to the office with a non palpable left breast lesion on MMG. MMG shows microcalcification at 5 o'clock position. BI RADS score not given but it is suspicious and biopsy recommended. She has no constitutional symptoms. She has no family history of breast cancer. She has history of abnormal MMG and core needle biopsy on the right breast several years ago. She has had no other breast surgery.     Past Medical History:   Diagnosis Date    Chronic insomnia     Neuromuscular disorder     Seasonal allergies     Seborrheic keratoses      Past Surgical History:   Procedure Laterality Date    COSMETIC SURGERY      SHOULDER SURGERY Left     TONSILLECTOMY       Review of patient's allergies indicates:   Allergen Reactions    Codeine Nausea Only     Medication List with Changes/Refills   Current Medications    IBUPROFEN (ADVIL,MOTRIN) 100 MG/5 ML SUSPENSION    ibuprofen   qd prn    OMEPRAZOLE (PRILOSEC) 20 MG CAPSULE    omeprazole    PHENTERMINE (ADIPEX-P) 37.5 MG TABLET    Take 37.5 mg by mouth every morning.    TRULANCE 3 MG TAB    TAKE 1 TABLET BY MOUTH EVERY DAY WITH MEALS     Family History   Problem Relation Age of Onset    Melanoma Neg Hx     Psoriasis Neg Hx     Lupus Neg Hx     Eczema Neg Hx      Social History     Socioeconomic History    Marital status:      Spouse name: Not on file    Number of children: Not on file    Years of education: Not on file    Highest education level: Not on file   Occupational History    Not on file   Social Needs    Financial resource strain: Not on file    Food insecurity     Worry: Not on file     Inability: Not on file    Transportation needs     Medical: Not on file     Non-medical: Not on file   Tobacco Use    Smoking status: Former Smoker     Quit date: 10/6/2002     Years since quittin.0     Smokeless tobacco: Never Used   Substance and Sexual Activity    Alcohol use: Yes    Drug use: No    Sexual activity: Yes   Lifestyle    Physical activity     Days per week: Not on file     Minutes per session: Not on file    Stress: Not on file   Relationships    Social connections     Talks on phone: Not on file     Gets together: Not on file     Attends [employer] service: Not on file     Active member of club or organization: Not on file     Attends meetings of clubs or organizations: Not on file     Relationship status: Not on file   Other Topics Concern    Are you pregnant or think you may be? Not Asked    Breast-feeding Not Asked   Social History Narrative    Not on file         Review of Systems   Constitutional: Negative for appetite change, chills, fever and unexpected weight change.   HENT: Negative for hearing loss, rhinorrhea, sore throat and voice change.    Eyes: Negative for photophobia and visual disturbance.   Respiratory: Negative for cough, choking and shortness of breath.    Cardiovascular: Negative for chest pain, palpitations and leg swelling.   Gastrointestinal: Negative for abdominal pain, blood in stool, constipation, diarrhea, nausea and vomiting.   Endocrine: Negative for cold intolerance, heat intolerance and polyphagia.   Genitourinary: Negative for dysuria.   Musculoskeletal: Negative for arthralgias and back pain.   Skin: Negative for color change.   Neurological: Negative for dizziness, seizures, syncope and headaches.   Hematological: Negative for adenopathy. Does not bruise/bleed easily.       Objective:      Physical Exam  Constitutional:       General: She is not in acute distress.     Appearance: Normal appearance. She is well-developed. She is not toxic-appearing.   HENT:      Head: Normocephalic and atraumatic. No abrasion or laceration.      Right Ear: External ear normal.      Left Ear: External ear normal.      Nose: Nose normal.   Eyes:      Pupils: Pupils are  equal, round, and reactive to light.   Neck:      Musculoskeletal: Neck supple. Normal range of motion.      Trachea: Trachea normal. No tracheal deviation.   Cardiovascular:      Rate and Rhythm: Normal rate and regular rhythm.   Pulmonary:      Effort: Pulmonary effort is normal. No tachypnea, accessory muscle usage or respiratory distress.   Chest:      Breasts: Breasts are symmetrical.         Right: No inverted nipple, mass, skin change or tenderness.         Left: No inverted nipple, mass, skin change or tenderness.   Abdominal:      General: There is no distension.      Palpations: Abdomen is soft.      Tenderness: There is no abdominal tenderness.   Lymphadenopathy:      Cervical: No cervical adenopathy.      Upper Body:      Right upper body: No axillary adenopathy.      Left upper body: No axillary adenopathy.   Skin:     General: Skin is warm.   Neurological:      Mental Status: She is alert and oriented to person, place, and time.      Coordination: Coordination normal.      Gait: Gait normal.   Psychiatric:         Speech: Speech normal.         Behavior: Behavior normal.         Assessment/Plan:   Paula was seen today for other.    Diagnoses and all orders for this visit:    Abnormal mammogram of left breast  -     Mammo Breast Stereotactic Biopsy Left; Future      Suspicion non palpable left breast lesion. Will be referred for image guided left breast biopsy. She will follow up after her path report returns and we will proceed accordingly.

## 2020-10-16 ENCOUNTER — TELEPHONE (OUTPATIENT)
Dept: SURGERY | Facility: CLINIC | Age: 60
End: 2020-10-16

## 2020-10-16 DIAGNOSIS — R92.8 ABNORMAL MAMMOGRAM OF LEFT BREAST: Primary | ICD-10-CM

## 2020-10-16 NOTE — TELEPHONE ENCOUNTER
Per , DIS Images are not clear, looks benign. Recommends 6 month follow up.. Stereotactic BX not recommended.

## 2021-01-11 DIAGNOSIS — R92.8 ABNORMAL MAMMOGRAM: Primary | ICD-10-CM

## 2021-02-17 ENCOUNTER — HOSPITAL ENCOUNTER (OUTPATIENT)
Dept: RADIOLOGY | Facility: HOSPITAL | Age: 61
Discharge: HOME | End: 2021-02-17
Attending: SURGERY
Payer: COMMERCIAL

## 2021-02-17 VITALS — BODY MASS INDEX: 32.74 KG/M2 | WEIGHT: 216.06 LBS | HEIGHT: 68 IN

## 2021-02-17 DIAGNOSIS — R92.8 ABNORMAL MAMMOGRAM: ICD-10-CM

## 2021-02-17 DIAGNOSIS — R92.8 ABNORMAL MAMMOGRAM OF LEFT BREAST: ICD-10-CM

## 2021-02-17 PROCEDURE — 77061 BREAST TOMOSYNTHESIS UNI: CPT | Mod: TC,PO,LT

## 2021-04-26 ENCOUNTER — PATIENT MESSAGE (OUTPATIENT)
Dept: FAMILY MEDICINE | Facility: CLINIC | Age: 61
End: 2021-04-26

## 2021-04-29 ENCOUNTER — PATIENT MESSAGE (OUTPATIENT)
Dept: RESEARCH | Facility: HOSPITAL | Age: 61
End: 2021-04-29

## 2021-05-04 ENCOUNTER — OFFICE VISIT (OUTPATIENT)
Dept: ORTHOPEDICS | Facility: CLINIC | Age: 61
End: 2021-05-04
Payer: COMMERCIAL

## 2021-05-04 VITALS
HEIGHT: 68 IN | SYSTOLIC BLOOD PRESSURE: 130 MMHG | BODY MASS INDEX: 32.74 KG/M2 | WEIGHT: 216 LBS | OXYGEN SATURATION: 99 % | HEART RATE: 72 BPM | DIASTOLIC BLOOD PRESSURE: 80 MMHG

## 2021-05-04 DIAGNOSIS — M18.12 ARTHRITIS OF CARPOMETACARPAL (CMC) JOINT OF LEFT THUMB: ICD-10-CM

## 2021-05-04 DIAGNOSIS — M18.11 ARTHRITIS OF CARPOMETACARPAL (CMC) JOINT OF RIGHT THUMB: Primary | ICD-10-CM

## 2021-05-04 PROCEDURE — 1125F PR PAIN SEVERITY QUANTIFIED, PAIN PRESENT: ICD-10-PCS | Mod: S$GLB,,, | Performed by: ORTHOPAEDIC SURGERY

## 2021-05-04 PROCEDURE — 99213 PR OFFICE/OUTPT VISIT, EST, LEVL III, 20-29 MIN: ICD-10-PCS | Mod: S$GLB,,, | Performed by: ORTHOPAEDIC SURGERY

## 2021-05-04 PROCEDURE — 1125F AMNT PAIN NOTED PAIN PRSNT: CPT | Mod: S$GLB,,, | Performed by: ORTHOPAEDIC SURGERY

## 2021-05-04 PROCEDURE — 3008F BODY MASS INDEX DOCD: CPT | Mod: S$GLB,,, | Performed by: ORTHOPAEDIC SURGERY

## 2021-05-04 PROCEDURE — 3008F PR BODY MASS INDEX (BMI) DOCUMENTED: ICD-10-PCS | Mod: S$GLB,,, | Performed by: ORTHOPAEDIC SURGERY

## 2021-05-04 PROCEDURE — 99213 OFFICE O/P EST LOW 20 MIN: CPT | Mod: S$GLB,,, | Performed by: ORTHOPAEDIC SURGERY

## 2021-05-04 RX ORDER — LUBIPROSTONE 24 UG/1
24 CAPSULE, GELATIN COATED ORAL 2 TIMES DAILY
COMMUNITY
Start: 2021-01-27 | End: 2021-05-27

## 2021-05-05 ENCOUNTER — PATIENT MESSAGE (OUTPATIENT)
Dept: ORTHOPEDICS | Facility: CLINIC | Age: 61
End: 2021-05-05

## 2021-05-07 DIAGNOSIS — Z01.818 PRE-OP TESTING: ICD-10-CM

## 2021-05-07 DIAGNOSIS — M18.12 ARTHRITIS OF CARPOMETACARPAL (CMC) JOINT OF LEFT THUMB: Primary | ICD-10-CM

## 2021-05-27 ENCOUNTER — OFFICE VISIT (OUTPATIENT)
Dept: DERMATOLOGY | Facility: CLINIC | Age: 61
End: 2021-05-27
Payer: COMMERCIAL

## 2021-05-27 ENCOUNTER — HOSPITAL ENCOUNTER (OUTPATIENT)
Dept: PREADMISSION TESTING | Facility: HOSPITAL | Age: 61
Discharge: HOME | End: 2021-05-27
Attending: ORTHOPAEDIC SURGERY
Payer: COMMERCIAL

## 2021-05-27 ENCOUNTER — HOSPITAL ENCOUNTER (OUTPATIENT)
Dept: RADIOLOGY | Facility: HOSPITAL | Age: 61
Discharge: HOME | End: 2021-05-27
Attending: ORTHOPAEDIC SURGERY
Payer: COMMERCIAL

## 2021-05-27 VITALS
HEIGHT: 68 IN | RESPIRATION RATE: 18 BRPM | OXYGEN SATURATION: 97 % | SYSTOLIC BLOOD PRESSURE: 96 MMHG | HEART RATE: 70 BPM | DIASTOLIC BLOOD PRESSURE: 64 MMHG | TEMPERATURE: 97 F | BODY MASS INDEX: 32.74 KG/M2 | WEIGHT: 216.06 LBS

## 2021-05-27 DIAGNOSIS — M18.12 ARTHRITIS OF CARPOMETACARPAL (CMC) JOINT OF LEFT THUMB: ICD-10-CM

## 2021-05-27 DIAGNOSIS — L82.1 SEBORRHEIC KERATOSES: ICD-10-CM

## 2021-05-27 DIAGNOSIS — D18.01 CHERRY ANGIOMA: ICD-10-CM

## 2021-05-27 DIAGNOSIS — L72.0 EPIDERMAL INCLUSION CYST: Primary | ICD-10-CM

## 2021-05-27 DIAGNOSIS — L81.4 SOLAR LENTIGO: ICD-10-CM

## 2021-05-27 PROCEDURE — 93005 ELECTROCARDIOGRAM TRACING: CPT | Performed by: SPECIALIST

## 2021-05-27 PROCEDURE — 11401 EXC TR-EXT B9+MARG 0.6-1 CM: CPT | Mod: S$GLB,,, | Performed by: DERMATOLOGY

## 2021-05-27 PROCEDURE — 99999 PR PBB SHADOW E&M-EST. PATIENT-LVL II: CPT | Mod: PBBFAC,,, | Performed by: DERMATOLOGY

## 2021-05-27 PROCEDURE — 99213 OFFICE O/P EST LOW 20 MIN: CPT | Mod: 25,S$GLB,, | Performed by: DERMATOLOGY

## 2021-05-27 PROCEDURE — 99213 PR OFFICE/OUTPT VISIT, EST, LEVL III, 20-29 MIN: ICD-10-PCS | Mod: 25,S$GLB,, | Performed by: DERMATOLOGY

## 2021-05-27 PROCEDURE — 71046 X-RAY EXAM CHEST 2 VIEWS: CPT | Mod: TC

## 2021-05-27 PROCEDURE — 93010 ELECTROCARDIOGRAM REPORT: CPT | Mod: ,,, | Performed by: SPECIALIST

## 2021-05-27 PROCEDURE — 93010 EKG 12-LEAD: ICD-10-PCS | Mod: ,,, | Performed by: SPECIALIST

## 2021-05-27 PROCEDURE — 11401 PR EXC SKIN BENIG 0.6-1 CM TRUNK,ARM,LEG: ICD-10-PCS | Mod: S$GLB,,, | Performed by: DERMATOLOGY

## 2021-05-27 PROCEDURE — 88304 TISSUE EXAM BY PATHOLOGIST: CPT | Performed by: DERMATOLOGY

## 2021-05-27 PROCEDURE — 88304 PR  SURG PATH,LEVEL III: ICD-10-PCS | Mod: 26,,, | Performed by: DERMATOLOGY

## 2021-05-27 PROCEDURE — 99999 PR PBB SHADOW E&M-EST. PATIENT-LVL II: ICD-10-PCS | Mod: PBBFAC,,, | Performed by: DERMATOLOGY

## 2021-05-27 PROCEDURE — 88304 TISSUE EXAM BY PATHOLOGIST: CPT | Mod: 26,,, | Performed by: DERMATOLOGY

## 2021-05-27 RX ORDER — MELATONIN 10 MG
1 CAPSULE ORAL NIGHTLY
Status: ON HOLD | COMMUNITY
End: 2021-10-19

## 2021-05-27 RX ORDER — LINACLOTIDE 290 UG/1
290 CAPSULE, GELATIN COATED ORAL DAILY
COMMUNITY
Start: 2021-05-12 | End: 2024-02-14

## 2021-06-01 LAB
FINAL PATHOLOGIC DIAGNOSIS: NORMAL
GROSS: NORMAL
Lab: NORMAL
MICROSCOPIC EXAM: NORMAL

## 2021-06-07 ENCOUNTER — LAB VISIT (OUTPATIENT)
Dept: PRIMARY CARE CLINIC | Facility: CLINIC | Age: 61
End: 2021-06-07
Payer: COMMERCIAL

## 2021-06-07 DIAGNOSIS — Z01.818 PRE-OP TESTING: ICD-10-CM

## 2021-06-07 PROCEDURE — U0003 INFECTIOUS AGENT DETECTION BY NUCLEIC ACID (DNA OR RNA); SEVERE ACUTE RESPIRATORY SYNDROME CORONAVIRUS 2 (SARS-COV-2) (CORONAVIRUS DISEASE [COVID-19]), AMPLIFIED PROBE TECHNIQUE, MAKING USE OF HIGH THROUGHPUT TECHNOLOGIES AS DESCRIBED BY CMS-2020-01-R: HCPCS | Performed by: ORTHOPAEDIC SURGERY

## 2021-06-07 PROCEDURE — U0005 INFEC AGEN DETEC AMPLI PROBE: HCPCS | Performed by: ORTHOPAEDIC SURGERY

## 2021-06-08 LAB — SARS-COV-2 RNA RESP QL NAA+PROBE: NOT DETECTED

## 2021-06-09 ENCOUNTER — HOSPITAL ENCOUNTER (OUTPATIENT)
Facility: HOSPITAL | Age: 61
Discharge: HOME | End: 2021-06-09
Attending: ORTHOPAEDIC SURGERY | Admitting: ORTHOPAEDIC SURGERY
Payer: COMMERCIAL

## 2021-06-09 ENCOUNTER — ANESTHESIA EVENT (OUTPATIENT)
Dept: SURGERY | Facility: HOSPITAL | Age: 61
End: 2021-06-09
Payer: COMMERCIAL

## 2021-06-09 ENCOUNTER — ANESTHESIA (OUTPATIENT)
Dept: SURGERY | Facility: HOSPITAL | Age: 61
End: 2021-06-09
Payer: COMMERCIAL

## 2021-06-09 VITALS
SYSTOLIC BLOOD PRESSURE: 150 MMHG | WEIGHT: 216 LBS | OXYGEN SATURATION: 100 % | HEART RATE: 58 BPM | RESPIRATION RATE: 16 BRPM | BODY MASS INDEX: 32.74 KG/M2 | HEIGHT: 68 IN | TEMPERATURE: 98 F | DIASTOLIC BLOOD PRESSURE: 67 MMHG

## 2021-06-09 DIAGNOSIS — M25.532 LEFT WRIST PAIN: ICD-10-CM

## 2021-06-09 DIAGNOSIS — M18.12 ARTHRITIS OF CARPOMETACARPAL (CMC) JOINT OF LEFT THUMB: Primary | ICD-10-CM

## 2021-06-09 PROCEDURE — 36000709 HC OR TIME LEV III EA ADD 15 MIN: Performed by: ORTHOPAEDIC SURGERY

## 2021-06-09 PROCEDURE — 27000671 HC TUBING MICROBORE EXT: Performed by: STUDENT IN AN ORGANIZED HEALTH CARE EDUCATION/TRAINING PROGRAM

## 2021-06-09 PROCEDURE — 27000673 HC TUBING BLOOD Y: Performed by: STUDENT IN AN ORGANIZED HEALTH CARE EDUCATION/TRAINING PROGRAM

## 2021-06-09 PROCEDURE — 63600175 PHARM REV CODE 636 W HCPCS: Performed by: NURSE ANESTHETIST, CERTIFIED REGISTERED

## 2021-06-09 PROCEDURE — 25000003 PHARM REV CODE 250: Performed by: ANESTHESIOLOGY

## 2021-06-09 PROCEDURE — C1713 ANCHOR/SCREW BN/BN,TIS/BN: HCPCS | Performed by: ORTHOPAEDIC SURGERY

## 2021-06-09 PROCEDURE — 71000039 HC RECOVERY, EACH ADD'L HOUR: Performed by: ORTHOPAEDIC SURGERY

## 2021-06-09 PROCEDURE — 71000033 HC RECOVERY, INTIAL HOUR: Performed by: ORTHOPAEDIC SURGERY

## 2021-06-09 PROCEDURE — 25000003 PHARM REV CODE 250: Performed by: NURSE ANESTHETIST, CERTIFIED REGISTERED

## 2021-06-09 PROCEDURE — 63600175 PHARM REV CODE 636 W HCPCS: Performed by: ANESTHESIOLOGY

## 2021-06-09 PROCEDURE — 27202107 HC XP QUATRO SENSOR: Performed by: STUDENT IN AN ORGANIZED HEALTH CARE EDUCATION/TRAINING PROGRAM

## 2021-06-09 PROCEDURE — 37000008 HC ANESTHESIA 1ST 15 MINUTES: Performed by: ORTHOPAEDIC SURGERY

## 2021-06-09 PROCEDURE — 71000015 HC POSTOP RECOV 1ST HR: Performed by: ORTHOPAEDIC SURGERY

## 2021-06-09 PROCEDURE — 36000708 HC OR TIME LEV III 1ST 15 MIN: Performed by: ORTHOPAEDIC SURGERY

## 2021-06-09 PROCEDURE — 27200651 HC AIRWAY, LMA: Performed by: STUDENT IN AN ORGANIZED HEALTH CARE EDUCATION/TRAINING PROGRAM

## 2021-06-09 PROCEDURE — 37000009 HC ANESTHESIA EA ADD 15 MINS: Performed by: ORTHOPAEDIC SURGERY

## 2021-06-09 RX ORDER — ONDANSETRON 2 MG/ML
INJECTION INTRAMUSCULAR; INTRAVENOUS
Status: DISCONTINUED | OUTPATIENT
Start: 2021-06-09 | End: 2021-06-09

## 2021-06-09 RX ORDER — LIDOCAINE HYDROCHLORIDE 20 MG/ML
INJECTION, SOLUTION EPIDURAL; INFILTRATION; INTRACAUDAL; PERINEURAL
Status: DISCONTINUED | OUTPATIENT
Start: 2021-06-09 | End: 2021-06-09

## 2021-06-09 RX ORDER — GABAPENTIN 100 MG/1
100 CAPSULE ORAL ONCE
Status: COMPLETED | OUTPATIENT
Start: 2021-06-09 | End: 2021-06-09

## 2021-06-09 RX ORDER — LIDOCAINE HYDROCHLORIDE 20 MG/ML
JELLY TOPICAL
Status: DISCONTINUED | OUTPATIENT
Start: 2021-06-09 | End: 2021-06-09

## 2021-06-09 RX ORDER — MEPERIDINE HYDROCHLORIDE 50 MG/ML
12.5 INJECTION INTRAMUSCULAR; INTRAVENOUS; SUBCUTANEOUS EVERY 10 MIN PRN
Status: DISCONTINUED | OUTPATIENT
Start: 2021-06-09 | End: 2021-06-09 | Stop reason: HOSPADM

## 2021-06-09 RX ORDER — CEFAZOLIN SODIUM 2 G/50ML
2 SOLUTION INTRAVENOUS
Status: DISCONTINUED | OUTPATIENT
Start: 2021-06-09 | End: 2021-06-09 | Stop reason: HOSPADM

## 2021-06-09 RX ORDER — PROPOFOL 10 MG/ML
VIAL (ML) INTRAVENOUS
Status: DISCONTINUED | OUTPATIENT
Start: 2021-06-09 | End: 2021-06-09

## 2021-06-09 RX ORDER — OXYCODONE HYDROCHLORIDE 5 MG/1
5 TABLET ORAL ONCE
Status: COMPLETED | OUTPATIENT
Start: 2021-06-09 | End: 2021-06-09

## 2021-06-09 RX ORDER — ACETAMINOPHEN 10 MG/ML
INJECTION, SOLUTION INTRAVENOUS
Status: DISCONTINUED | OUTPATIENT
Start: 2021-06-09 | End: 2021-06-09

## 2021-06-09 RX ORDER — DEXAMETHASONE SODIUM PHOSPHATE 4 MG/ML
INJECTION, SOLUTION INTRA-ARTICULAR; INTRALESIONAL; INTRAMUSCULAR; INTRAVENOUS; SOFT TISSUE
Status: DISCONTINUED | OUTPATIENT
Start: 2021-06-09 | End: 2021-06-09

## 2021-06-09 RX ORDER — FENTANYL CITRATE 50 UG/ML
25 INJECTION, SOLUTION INTRAMUSCULAR; INTRAVENOUS
Status: COMPLETED | OUTPATIENT
Start: 2021-06-09 | End: 2021-06-09

## 2021-06-09 RX ORDER — ONDANSETRON 2 MG/ML
4 INJECTION INTRAMUSCULAR; INTRAVENOUS DAILY PRN
Status: DISCONTINUED | OUTPATIENT
Start: 2021-06-09 | End: 2021-06-09 | Stop reason: HOSPADM

## 2021-06-09 RX ORDER — DIPHENHYDRAMINE HYDROCHLORIDE 50 MG/ML
INJECTION INTRAMUSCULAR; INTRAVENOUS
Status: DISCONTINUED | OUTPATIENT
Start: 2021-06-09 | End: 2021-06-09

## 2021-06-09 RX ORDER — SODIUM CHLORIDE, SODIUM LACTATE, POTASSIUM CHLORIDE, CALCIUM CHLORIDE 600; 310; 30; 20 MG/100ML; MG/100ML; MG/100ML; MG/100ML
INJECTION, SOLUTION INTRAVENOUS CONTINUOUS PRN
Status: DISCONTINUED | OUTPATIENT
Start: 2021-06-09 | End: 2021-06-09

## 2021-06-09 RX ORDER — SODIUM CHLORIDE 0.9 % (FLUSH) 0.9 %
10 SYRINGE (ML) INJECTION
Status: DISCONTINUED | OUTPATIENT
Start: 2021-06-09 | End: 2021-06-09 | Stop reason: HOSPADM

## 2021-06-09 RX ORDER — MIDAZOLAM HYDROCHLORIDE 1 MG/ML
INJECTION INTRAMUSCULAR; INTRAVENOUS
Status: DISCONTINUED | OUTPATIENT
Start: 2021-06-09 | End: 2021-06-09

## 2021-06-09 RX ORDER — CELECOXIB 100 MG/1
100 CAPSULE ORAL ONCE
Status: COMPLETED | OUTPATIENT
Start: 2021-06-09 | End: 2021-06-09

## 2021-06-09 RX ORDER — OXYCODONE HYDROCHLORIDE 5 MG/1
5 TABLET ORAL
Status: DISCONTINUED | OUTPATIENT
Start: 2021-06-09 | End: 2021-06-09 | Stop reason: HOSPADM

## 2021-06-09 RX ORDER — FENTANYL CITRATE 50 UG/ML
INJECTION, SOLUTION INTRAMUSCULAR; INTRAVENOUS
Status: DISCONTINUED | OUTPATIENT
Start: 2021-06-09 | End: 2021-06-09

## 2021-06-09 RX ORDER — FAMOTIDINE 10 MG/ML
INJECTION INTRAVENOUS
Status: DISCONTINUED | OUTPATIENT
Start: 2021-06-09 | End: 2021-06-09

## 2021-06-09 RX ORDER — OXYCODONE AND ACETAMINOPHEN 7.5; 325 MG/1; MG/1
1 TABLET ORAL EVERY 4 HOURS PRN
Qty: 28 TABLET | Refills: 0 | Status: ON HOLD | OUTPATIENT
Start: 2021-06-09 | End: 2021-10-19

## 2021-06-09 RX ORDER — DIPHENHYDRAMINE HYDROCHLORIDE 50 MG/ML
12.5 INJECTION INTRAMUSCULAR; INTRAVENOUS
Status: DISCONTINUED | OUTPATIENT
Start: 2021-06-09 | End: 2021-06-09 | Stop reason: HOSPADM

## 2021-06-09 RX ADMIN — ONDANSETRON 4 MG: 2 INJECTION INTRAMUSCULAR; INTRAVENOUS at 12:06

## 2021-06-09 RX ADMIN — FENTANYL CITRATE 25 MCG: 50 INJECTION INTRAMUSCULAR; INTRAVENOUS at 01:06

## 2021-06-09 RX ADMIN — OXYCODONE HYDROCHLORIDE 5 MG: 5 TABLET ORAL at 02:06

## 2021-06-09 RX ADMIN — ACETAMINOPHEN 1000 MG: 10 INJECTION, SOLUTION INTRAVENOUS at 12:06

## 2021-06-09 RX ADMIN — PROPOFOL 150 MG: 10 INJECTION, EMULSION INTRAVENOUS at 12:06

## 2021-06-09 RX ADMIN — FAMOTIDINE 20 MG: 10 INJECTION, SOLUTION INTRAVENOUS at 12:06

## 2021-06-09 RX ADMIN — FENTANYL CITRATE 25 MCG: 50 INJECTION INTRAMUSCULAR; INTRAVENOUS at 02:06

## 2021-06-09 RX ADMIN — GABAPENTIN 100 MG: 100 CAPSULE ORAL at 10:06

## 2021-06-09 RX ADMIN — GLYCOPYRROLATE 0.3 MG: 0.2 INJECTION, SOLUTION INTRAMUSCULAR; INTRAVITREAL at 12:06

## 2021-06-09 RX ADMIN — DIPHENHYDRAMINE HYDROCHLORIDE 12.5 MG: 50 INJECTION, SOLUTION INTRAMUSCULAR; INTRAVENOUS at 12:06

## 2021-06-09 RX ADMIN — CELECOXIB 100 MG: 100 CAPSULE ORAL at 10:06

## 2021-06-09 RX ADMIN — LIDOCAINE HYDROCHLORIDE 4 ML: 20 JELLY TOPICAL at 12:06

## 2021-06-09 RX ADMIN — FENTANYL CITRATE 50 MCG: 50 INJECTION INTRAMUSCULAR; INTRAVENOUS at 01:06

## 2021-06-09 RX ADMIN — LIDOCAINE HYDROCHLORIDE 80 MG: 20 INJECTION, SOLUTION EPIDURAL; INFILTRATION; INTRACAUDAL; PERINEURAL at 12:06

## 2021-06-09 RX ADMIN — MIDAZOLAM HYDROCHLORIDE 2 MG: 1 INJECTION, SOLUTION INTRAMUSCULAR; INTRAVENOUS at 12:06

## 2021-06-09 RX ADMIN — FENTANYL CITRATE 50 MCG: 50 INJECTION INTRAMUSCULAR; INTRAVENOUS at 12:06

## 2021-06-09 RX ADMIN — DEXAMETHASONE SODIUM PHOSPHATE 8 MG: 4 INJECTION, SOLUTION INTRAMUSCULAR; INTRAVENOUS at 12:06

## 2021-06-09 RX ADMIN — OXYCODONE HYDROCHLORIDE 5 MG: 5 TABLET ORAL at 01:06

## 2021-06-09 RX ADMIN — SODIUM CHLORIDE, SODIUM LACTATE, POTASSIUM CHLORIDE, AND CALCIUM CHLORIDE: .6; .31; .03; .02 INJECTION, SOLUTION INTRAVENOUS at 11:06

## 2021-06-23 ENCOUNTER — OFFICE VISIT (OUTPATIENT)
Dept: ORTHOPEDICS | Facility: CLINIC | Age: 61
End: 2021-06-23
Payer: COMMERCIAL

## 2021-06-23 VITALS
DIASTOLIC BLOOD PRESSURE: 70 MMHG | HEIGHT: 68 IN | BODY MASS INDEX: 32.28 KG/M2 | SYSTOLIC BLOOD PRESSURE: 160 MMHG | HEART RATE: 59 BPM | WEIGHT: 213 LBS

## 2021-06-23 DIAGNOSIS — M18.12 ARTHRITIS OF CARPOMETACARPAL (CMC) JOINT OF LEFT THUMB: Primary | ICD-10-CM

## 2021-06-23 PROCEDURE — 1125F AMNT PAIN NOTED PAIN PRSNT: CPT | Mod: S$GLB,,, | Performed by: PHYSICIAN ASSISTANT

## 2021-06-23 PROCEDURE — 29075 APPL CST ELBW FNGR SHORT ARM: CPT | Mod: 58,LT,S$GLB, | Performed by: PHYSICIAN ASSISTANT

## 2021-06-23 PROCEDURE — 3008F BODY MASS INDEX DOCD: CPT | Mod: S$GLB,,, | Performed by: PHYSICIAN ASSISTANT

## 2021-06-23 PROCEDURE — 3008F PR BODY MASS INDEX (BMI) DOCUMENTED: ICD-10-PCS | Mod: S$GLB,,, | Performed by: PHYSICIAN ASSISTANT

## 2021-06-23 PROCEDURE — 29075 PR APPLY FOREARM CAST: ICD-10-PCS | Mod: 58,LT,S$GLB, | Performed by: PHYSICIAN ASSISTANT

## 2021-06-23 PROCEDURE — 1125F PR PAIN SEVERITY QUANTIFIED, PAIN PRESENT: ICD-10-PCS | Mod: S$GLB,,, | Performed by: PHYSICIAN ASSISTANT

## 2021-06-23 PROCEDURE — 99024 PR POST-OP FOLLOW-UP VISIT: ICD-10-PCS | Mod: S$GLB,,, | Performed by: PHYSICIAN ASSISTANT

## 2021-06-23 PROCEDURE — 99024 POSTOP FOLLOW-UP VISIT: CPT | Mod: S$GLB,,, | Performed by: PHYSICIAN ASSISTANT

## 2021-07-07 ENCOUNTER — TELEPHONE (OUTPATIENT)
Dept: ORTHOPEDICS | Facility: CLINIC | Age: 61
End: 2021-07-07

## 2021-07-08 ENCOUNTER — OFFICE VISIT (OUTPATIENT)
Dept: ORTHOPEDICS | Facility: CLINIC | Age: 61
End: 2021-07-08
Payer: COMMERCIAL

## 2021-07-08 VITALS — BODY MASS INDEX: 32.28 KG/M2 | HEIGHT: 68 IN | WEIGHT: 213 LBS

## 2021-07-08 DIAGNOSIS — M18.12 ARTHRITIS OF CARPOMETACARPAL (CMC) JOINT OF LEFT THUMB: Primary | ICD-10-CM

## 2021-07-08 DIAGNOSIS — Z98.890 H/O THUMB SURGERY: ICD-10-CM

## 2021-07-08 PROCEDURE — 99024 PR POST-OP FOLLOW-UP VISIT: ICD-10-PCS | Mod: S$GLB,,, | Performed by: ORTHOPAEDIC SURGERY

## 2021-07-08 PROCEDURE — 3008F PR BODY MASS INDEX (BMI) DOCUMENTED: ICD-10-PCS | Mod: S$GLB,,, | Performed by: ORTHOPAEDIC SURGERY

## 2021-07-08 PROCEDURE — 1125F AMNT PAIN NOTED PAIN PRSNT: CPT | Mod: S$GLB,,, | Performed by: ORTHOPAEDIC SURGERY

## 2021-07-08 PROCEDURE — 99024 POSTOP FOLLOW-UP VISIT: CPT | Mod: S$GLB,,, | Performed by: ORTHOPAEDIC SURGERY

## 2021-07-08 PROCEDURE — 3008F BODY MASS INDEX DOCD: CPT | Mod: S$GLB,,, | Performed by: ORTHOPAEDIC SURGERY

## 2021-07-08 PROCEDURE — 1125F PR PAIN SEVERITY QUANTIFIED, PAIN PRESENT: ICD-10-PCS | Mod: S$GLB,,, | Performed by: ORTHOPAEDIC SURGERY

## 2021-08-12 ENCOUNTER — OFFICE VISIT (OUTPATIENT)
Dept: ORTHOPEDICS | Facility: CLINIC | Age: 61
End: 2021-08-12
Payer: COMMERCIAL

## 2021-08-12 VITALS — BODY MASS INDEX: 32.28 KG/M2 | HEIGHT: 68 IN | WEIGHT: 213 LBS

## 2021-08-12 DIAGNOSIS — M18.12 ARTHRITIS OF CARPOMETACARPAL (CMC) JOINT OF LEFT THUMB: Primary | ICD-10-CM

## 2021-08-12 PROCEDURE — 3008F PR BODY MASS INDEX (BMI) DOCUMENTED: ICD-10-PCS | Mod: S$GLB,,, | Performed by: ORTHOPAEDIC SURGERY

## 2021-08-12 PROCEDURE — 99024 POSTOP FOLLOW-UP VISIT: CPT | Mod: S$GLB,,, | Performed by: ORTHOPAEDIC SURGERY

## 2021-08-12 PROCEDURE — 1125F AMNT PAIN NOTED PAIN PRSNT: CPT | Mod: S$GLB,,, | Performed by: ORTHOPAEDIC SURGERY

## 2021-08-12 PROCEDURE — 99024 PR POST-OP FOLLOW-UP VISIT: ICD-10-PCS | Mod: S$GLB,,, | Performed by: ORTHOPAEDIC SURGERY

## 2021-08-12 PROCEDURE — 1125F PR PAIN SEVERITY QUANTIFIED, PAIN PRESENT: ICD-10-PCS | Mod: S$GLB,,, | Performed by: ORTHOPAEDIC SURGERY

## 2021-08-12 PROCEDURE — 3008F BODY MASS INDEX DOCD: CPT | Mod: S$GLB,,, | Performed by: ORTHOPAEDIC SURGERY

## 2021-08-12 PROCEDURE — 1160F PR REVIEW ALL MEDS BY PRESCRIBER/CLIN PHARMACIST DOCUMENTED: ICD-10-PCS | Mod: S$GLB,,, | Performed by: ORTHOPAEDIC SURGERY

## 2021-08-12 PROCEDURE — 1160F RVW MEDS BY RX/DR IN RCRD: CPT | Mod: S$GLB,,, | Performed by: ORTHOPAEDIC SURGERY

## 2021-08-12 RX ORDER — TETRACYCLINE HYDROCHLORIDE 500 MG/1
CAPSULE ORAL
COMMUNITY
End: 2021-10-15

## 2021-08-12 RX ORDER — METRONIDAZOLE 250 MG/1
TABLET ORAL
COMMUNITY
End: 2021-10-15

## 2021-08-17 ENCOUNTER — TELEPHONE (OUTPATIENT)
Dept: ORTHOPEDICS | Facility: CLINIC | Age: 61
End: 2021-08-17

## 2021-09-29 ENCOUNTER — HOSPITAL ENCOUNTER (OUTPATIENT)
Dept: RADIOLOGY | Facility: HOSPITAL | Age: 61
Discharge: HOME | End: 2021-09-29
Attending: SURGERY
Payer: COMMERCIAL

## 2021-09-29 DIAGNOSIS — R92.0 ABNORMAL FINDING ON MAMMOGRAPHY, MICROCALCIFICATION: ICD-10-CM

## 2021-09-29 PROCEDURE — 77061 BREAST TOMOSYNTHESIS UNI: CPT | Mod: TC,PO,LT

## 2021-10-05 ENCOUNTER — HOSPITAL ENCOUNTER (OUTPATIENT)
Dept: RADIOLOGY | Facility: HOSPITAL | Age: 61
Discharge: HOME | End: 2021-10-05
Attending: SURGERY
Payer: COMMERCIAL

## 2021-10-05 DIAGNOSIS — R92.0 ABNORMAL FINDING ON MAMMOGRAPHY, MICROCALCIFICATION: ICD-10-CM

## 2021-10-05 PROCEDURE — 19081 BX BREAST 1ST LESION STRTCTC: CPT | Mod: PO,LT

## 2021-10-13 DIAGNOSIS — R92.0 ABNORMAL FINDING ON MAMMOGRAPHY, MICROCALCIFICATION: Primary | ICD-10-CM

## 2021-10-15 ENCOUNTER — HOSPITAL ENCOUNTER (OUTPATIENT)
Dept: RADIOLOGY | Facility: HOSPITAL | Age: 61
Discharge: HOME | End: 2021-10-15
Attending: SURGERY
Payer: COMMERCIAL

## 2021-10-15 ENCOUNTER — HOSPITAL ENCOUNTER (OUTPATIENT)
Dept: PREADMISSION TESTING | Facility: HOSPITAL | Age: 61
Discharge: HOME | End: 2021-10-15
Attending: SURGERY
Payer: COMMERCIAL

## 2021-10-15 VITALS
HEIGHT: 68 IN | TEMPERATURE: 98 F | DIASTOLIC BLOOD PRESSURE: 84 MMHG | WEIGHT: 211.56 LBS | BODY MASS INDEX: 32.06 KG/M2 | SYSTOLIC BLOOD PRESSURE: 151 MMHG | RESPIRATION RATE: 16 BRPM | OXYGEN SATURATION: 100 % | HEART RATE: 66 BPM

## 2021-10-15 DIAGNOSIS — R92.0 ABNORMAL FINDING ON MAMMOGRAPHY, MICROCALCIFICATION: Primary | ICD-10-CM

## 2021-10-15 DIAGNOSIS — Z01.818 PREOP TESTING: Primary | ICD-10-CM

## 2021-10-15 DIAGNOSIS — R92.0 ABNORMAL FINDING ON MAMMOGRAPHY, MICROCALCIFICATION: ICD-10-CM

## 2021-10-15 PROCEDURE — 71046 X-RAY EXAM CHEST 2 VIEWS: CPT | Mod: TC

## 2021-10-15 PROCEDURE — 19285 PERQ DEV BREAST 1ST US IMAG: CPT

## 2021-10-15 PROCEDURE — A4550 SURGICAL TRAYS: HCPCS

## 2021-10-15 RX ORDER — CEFAZOLIN SODIUM 2 G/50ML
2 SOLUTION INTRAVENOUS ONCE
Status: CANCELLED | OUTPATIENT
Start: 2021-10-19

## 2021-10-19 ENCOUNTER — ANESTHESIA EVENT (OUTPATIENT)
Dept: SURGERY | Facility: HOSPITAL | Age: 61
End: 2021-10-19
Payer: COMMERCIAL

## 2021-10-19 ENCOUNTER — HOSPITAL ENCOUNTER (OUTPATIENT)
Facility: HOSPITAL | Age: 61
Discharge: HOME | End: 2021-10-19
Attending: SURGERY | Admitting: SURGERY
Payer: COMMERCIAL

## 2021-10-19 ENCOUNTER — ANESTHESIA (OUTPATIENT)
Dept: SURGERY | Facility: HOSPITAL | Age: 61
End: 2021-10-19
Payer: COMMERCIAL

## 2021-10-19 VITALS
WEIGHT: 211.44 LBS | HEIGHT: 68 IN | TEMPERATURE: 98 F | RESPIRATION RATE: 12 BRPM | BODY MASS INDEX: 32.05 KG/M2 | OXYGEN SATURATION: 100 % | SYSTOLIC BLOOD PRESSURE: 126 MMHG | DIASTOLIC BLOOD PRESSURE: 77 MMHG | HEART RATE: 63 BPM

## 2021-10-19 DIAGNOSIS — Z01.818 PREOP TESTING: ICD-10-CM

## 2021-10-19 DIAGNOSIS — Z01.818 PRE-OP TESTING: ICD-10-CM

## 2021-10-19 DIAGNOSIS — D05.12 BREAST NEOPLASM, TIS (DCIS), LEFT: Primary | ICD-10-CM

## 2021-10-19 LAB — SARS-COV-2 RDRP RESP QL NAA+PROBE: NEGATIVE

## 2021-10-19 PROCEDURE — 63600175 PHARM REV CODE 636 W HCPCS: Performed by: NURSE ANESTHETIST, CERTIFIED REGISTERED

## 2021-10-19 PROCEDURE — 27000673 HC TUBING BLOOD Y: Performed by: STUDENT IN AN ORGANIZED HEALTH CARE EDUCATION/TRAINING PROGRAM

## 2021-10-19 PROCEDURE — 27201107 HC STYLET, STANDARD: Performed by: STUDENT IN AN ORGANIZED HEALTH CARE EDUCATION/TRAINING PROGRAM

## 2021-10-19 PROCEDURE — 27000653 HC CATH, IV CATHLN: Performed by: STUDENT IN AN ORGANIZED HEALTH CARE EDUCATION/TRAINING PROGRAM

## 2021-10-19 PROCEDURE — 71000015 HC POSTOP RECOV 1ST HR: Performed by: SURGERY

## 2021-10-19 PROCEDURE — 25000003 PHARM REV CODE 250: Performed by: ANESTHESIOLOGY

## 2021-10-19 PROCEDURE — 63600175 PHARM REV CODE 636 W HCPCS: Performed by: SURGERY

## 2021-10-19 PROCEDURE — A4648 IMPLANTABLE TISSUE MARKER: HCPCS | Performed by: SURGERY

## 2021-10-19 PROCEDURE — 63600175 PHARM REV CODE 636 W HCPCS: Performed by: ANESTHESIOLOGY

## 2021-10-19 PROCEDURE — 27000671 HC TUBING MICROBORE EXT: Performed by: STUDENT IN AN ORGANIZED HEALTH CARE EDUCATION/TRAINING PROGRAM

## 2021-10-19 PROCEDURE — 36000706: Performed by: SURGERY

## 2021-10-19 PROCEDURE — 25000003 PHARM REV CODE 250: Performed by: SURGERY

## 2021-10-19 PROCEDURE — 27202105 HC BIS BILATERAL SENSOR: Performed by: STUDENT IN AN ORGANIZED HEALTH CARE EDUCATION/TRAINING PROGRAM

## 2021-10-19 PROCEDURE — 37000009 HC ANESTHESIA EA ADD 15 MINS: Performed by: SURGERY

## 2021-10-19 PROCEDURE — C9290 INJ, BUPIVACAINE LIPOSOME: HCPCS | Performed by: SURGERY

## 2021-10-19 PROCEDURE — U0002 COVID-19 LAB TEST NON-CDC: HCPCS | Performed by: SURGERY

## 2021-10-19 PROCEDURE — 36000707: Performed by: SURGERY

## 2021-10-19 PROCEDURE — 71000033 HC RECOVERY, INTIAL HOUR: Performed by: SURGERY

## 2021-10-19 PROCEDURE — 27201423 OPTIME MED/SURG SUP & DEVICES STERILE SUPPLY: Performed by: SURGERY

## 2021-10-19 PROCEDURE — 37000008 HC ANESTHESIA 1ST 15 MINUTES: Performed by: SURGERY

## 2021-10-19 PROCEDURE — 25000003 PHARM REV CODE 250: Performed by: NURSE ANESTHETIST, CERTIFIED REGISTERED

## 2021-10-19 DEVICE — IMPLANTABLE DEVICE: Type: IMPLANTABLE DEVICE | Site: BREAST | Status: FUNCTIONAL

## 2021-10-19 RX ORDER — BUPIVACAINE HYDROCHLORIDE 5 MG/ML
INJECTION, SOLUTION EPIDURAL; INTRACAUDAL
Status: DISCONTINUED | OUTPATIENT
Start: 2021-10-19 | End: 2021-10-19 | Stop reason: HOSPADM

## 2021-10-19 RX ORDER — HYDROCODONE BITARTRATE AND ACETAMINOPHEN 5; 325 MG/1; MG/1
1 TABLET ORAL EVERY 6 HOURS PRN
Status: CANCELLED | OUTPATIENT
Start: 2021-10-19

## 2021-10-19 RX ORDER — LIDOCAINE HYDROCHLORIDE 10 MG/ML
INJECTION, SOLUTION EPIDURAL; INFILTRATION; INTRACAUDAL; PERINEURAL
Status: DISCONTINUED | OUTPATIENT
Start: 2021-10-19 | End: 2021-10-19

## 2021-10-19 RX ORDER — FAMOTIDINE 10 MG/ML
INJECTION INTRAVENOUS
Status: DISCONTINUED | OUTPATIENT
Start: 2021-10-19 | End: 2021-10-19

## 2021-10-19 RX ORDER — HYDROMORPHONE HYDROCHLORIDE 1 MG/ML
0.2 INJECTION, SOLUTION INTRAMUSCULAR; INTRAVENOUS; SUBCUTANEOUS
Status: DISCONTINUED | OUTPATIENT
Start: 2021-10-19 | End: 2021-10-19 | Stop reason: HOSPADM

## 2021-10-19 RX ORDER — ROCURONIUM BROMIDE 10 MG/ML
INJECTION, SOLUTION INTRAVENOUS
Status: DISCONTINUED | OUTPATIENT
Start: 2021-10-19 | End: 2021-10-19

## 2021-10-19 RX ORDER — SODIUM CHLORIDE 0.9 % (FLUSH) 0.9 %
10 SYRINGE (ML) INJECTION
Status: DISCONTINUED | OUTPATIENT
Start: 2021-10-19 | End: 2021-10-19 | Stop reason: HOSPADM

## 2021-10-19 RX ORDER — ONDANSETRON 2 MG/ML
INJECTION INTRAMUSCULAR; INTRAVENOUS
Status: DISCONTINUED | OUTPATIENT
Start: 2021-10-19 | End: 2021-10-19

## 2021-10-19 RX ORDER — SUCCINYLCHOLINE CHLORIDE 20 MG/ML
INJECTION INTRAMUSCULAR; INTRAVENOUS
Status: DISCONTINUED | OUTPATIENT
Start: 2021-10-19 | End: 2021-10-19

## 2021-10-19 RX ORDER — MEPERIDINE HYDROCHLORIDE 50 MG/ML
12.5 INJECTION INTRAMUSCULAR; INTRAVENOUS; SUBCUTANEOUS EVERY 10 MIN PRN
Status: DISCONTINUED | OUTPATIENT
Start: 2021-10-19 | End: 2021-10-19 | Stop reason: HOSPADM

## 2021-10-19 RX ORDER — SODIUM CHLORIDE, SODIUM LACTATE, POTASSIUM CHLORIDE, CALCIUM CHLORIDE 600; 310; 30; 20 MG/100ML; MG/100ML; MG/100ML; MG/100ML
INJECTION, SOLUTION INTRAVENOUS CONTINUOUS PRN
Status: DISCONTINUED | OUTPATIENT
Start: 2021-10-19 | End: 2021-10-19

## 2021-10-19 RX ORDER — ACETAMINOPHEN 10 MG/ML
1000 INJECTION, SOLUTION INTRAVENOUS ONCE
Status: CANCELLED | OUTPATIENT
Start: 2021-10-19 | End: 2021-10-19

## 2021-10-19 RX ORDER — ONDANSETRON 2 MG/ML
4 INJECTION INTRAMUSCULAR; INTRAVENOUS DAILY PRN
Status: DISCONTINUED | OUTPATIENT
Start: 2021-10-19 | End: 2021-10-19 | Stop reason: HOSPADM

## 2021-10-19 RX ORDER — PROPOFOL 10 MG/ML
VIAL (ML) INTRAVENOUS
Status: DISCONTINUED | OUTPATIENT
Start: 2021-10-19 | End: 2021-10-19

## 2021-10-19 RX ORDER — DIPHENHYDRAMINE HYDROCHLORIDE 50 MG/ML
12.5 INJECTION INTRAMUSCULAR; INTRAVENOUS
Status: DISCONTINUED | OUTPATIENT
Start: 2021-10-19 | End: 2021-10-19 | Stop reason: HOSPADM

## 2021-10-19 RX ORDER — HYDROCODONE BITARTRATE AND ACETAMINOPHEN 5; 325 MG/1; MG/1
1 TABLET ORAL EVERY 8 HOURS PRN
Qty: 15 TABLET | Refills: 0 | Status: SHIPPED | OUTPATIENT
Start: 2021-10-19 | End: 2021-11-17

## 2021-10-19 RX ORDER — MIDAZOLAM HYDROCHLORIDE 1 MG/ML
INJECTION INTRAMUSCULAR; INTRAVENOUS
Status: DISCONTINUED | OUTPATIENT
Start: 2021-10-19 | End: 2021-10-19

## 2021-10-19 RX ORDER — FENTANYL CITRATE 50 UG/ML
INJECTION, SOLUTION INTRAMUSCULAR; INTRAVENOUS
Status: DISCONTINUED | OUTPATIENT
Start: 2021-10-19 | End: 2021-10-19

## 2021-10-19 RX ORDER — ACETAMINOPHEN 325 MG/1
650 TABLET ORAL EVERY 4 HOURS PRN
Status: CANCELLED | OUTPATIENT
Start: 2021-10-19

## 2021-10-19 RX ORDER — EPHEDRINE SULFATE 50 MG/ML
INJECTION, SOLUTION INTRAVENOUS
Status: DISCONTINUED | OUTPATIENT
Start: 2021-10-19 | End: 2021-10-19

## 2021-10-19 RX ORDER — OXYCODONE HYDROCHLORIDE 5 MG/1
5 TABLET ORAL
Status: DISCONTINUED | OUTPATIENT
Start: 2021-10-19 | End: 2021-10-19 | Stop reason: HOSPADM

## 2021-10-19 RX ORDER — DEXAMETHASONE SODIUM PHOSPHATE 4 MG/ML
INJECTION, SOLUTION INTRA-ARTICULAR; INTRALESIONAL; INTRAMUSCULAR; INTRAVENOUS; SOFT TISSUE
Status: DISCONTINUED | OUTPATIENT
Start: 2021-10-19 | End: 2021-10-19

## 2021-10-19 RX ORDER — ONDANSETRON 2 MG/ML
4 INJECTION INTRAMUSCULAR; INTRAVENOUS EVERY 12 HOURS PRN
Status: CANCELLED | OUTPATIENT
Start: 2021-10-19

## 2021-10-19 RX ORDER — CEFAZOLIN SODIUM 2 G/50ML
2 SOLUTION INTRAVENOUS ONCE
Status: COMPLETED | OUTPATIENT
Start: 2021-10-19 | End: 2021-10-19

## 2021-10-19 RX ORDER — CELECOXIB 100 MG/1
200 CAPSULE ORAL ONCE
Status: COMPLETED | OUTPATIENT
Start: 2021-10-19 | End: 2021-10-19

## 2021-10-19 RX ORDER — GABAPENTIN 100 MG/1
100 CAPSULE ORAL ONCE
Status: COMPLETED | OUTPATIENT
Start: 2021-10-19 | End: 2021-10-19

## 2021-10-19 RX ORDER — ACETAMINOPHEN 10 MG/ML
INJECTION, SOLUTION INTRAVENOUS
Status: DISCONTINUED | OUTPATIENT
Start: 2021-10-19 | End: 2021-10-19

## 2021-10-19 RX ADMIN — ACETAMINOPHEN 1000 MG: 10 INJECTION, SOLUTION INTRAVENOUS at 07:10

## 2021-10-19 RX ADMIN — LIDOCAINE HYDROCHLORIDE 80 MG: 10 INJECTION, SOLUTION EPIDURAL; INFILTRATION; INTRACAUDAL; PERINEURAL at 07:10

## 2021-10-19 RX ADMIN — SODIUM CHLORIDE, SODIUM LACTATE, POTASSIUM CHLORIDE, AND CALCIUM CHLORIDE: .6; .31; .03; .02 INJECTION, SOLUTION INTRAVENOUS at 07:10

## 2021-10-19 RX ADMIN — ROCURONIUM BROMIDE 25 MG: 10 INJECTION, SOLUTION INTRAVENOUS at 07:10

## 2021-10-19 RX ADMIN — FENTANYL CITRATE 50 MCG: 50 INJECTION INTRAMUSCULAR; INTRAVENOUS at 08:10

## 2021-10-19 RX ADMIN — FENTANYL CITRATE 50 MCG: 50 INJECTION INTRAMUSCULAR; INTRAVENOUS at 07:10

## 2021-10-19 RX ADMIN — ROCURONIUM BROMIDE 5 MG: 10 INJECTION, SOLUTION INTRAVENOUS at 07:10

## 2021-10-19 RX ADMIN — EPHEDRINE SULFATE 10 MG: 50 INJECTION INTRAVENOUS at 08:10

## 2021-10-19 RX ADMIN — CEFAZOLIN SODIUM 2 G: 2 SOLUTION INTRAVENOUS at 07:10

## 2021-10-19 RX ADMIN — DIPHENHYDRAMINE HYDROCHLORIDE 12.5 MG: 50 INJECTION, SOLUTION INTRAMUSCULAR; INTRAVENOUS at 08:10

## 2021-10-19 RX ADMIN — OXYCODONE HYDROCHLORIDE 5 MG: 5 TABLET ORAL at 09:10

## 2021-10-19 RX ADMIN — ONDANSETRON 4 MG: 2 INJECTION INTRAMUSCULAR; INTRAVENOUS at 07:10

## 2021-10-19 RX ADMIN — MIDAZOLAM HYDROCHLORIDE 2 MG: 1 INJECTION, SOLUTION INTRAMUSCULAR; INTRAVENOUS at 07:10

## 2021-10-19 RX ADMIN — DEXAMETHASONE SODIUM PHOSPHATE 8 MG: 4 INJECTION, SOLUTION INTRAMUSCULAR; INTRAVENOUS at 07:10

## 2021-10-19 RX ADMIN — SUGAMMADEX 200 MG: 100 INJECTION, SOLUTION INTRAVENOUS at 09:10

## 2021-10-19 RX ADMIN — PROPOFOL 150 MG: 10 INJECTION, EMULSION INTRAVENOUS at 07:10

## 2021-10-19 RX ADMIN — CELECOXIB 200 MG: 100 CAPSULE ORAL at 06:10

## 2021-10-19 RX ADMIN — FAMOTIDINE 20 MG: 10 INJECTION, SOLUTION INTRAVENOUS at 07:10

## 2021-10-19 RX ADMIN — GABAPENTIN 100 MG: 100 CAPSULE ORAL at 06:10

## 2021-10-19 RX ADMIN — SUCCINYLCHOLINE CHLORIDE 160 MG: 20 INJECTION, SOLUTION INTRAMUSCULAR; INTRAVENOUS at 07:10

## 2021-11-17 ENCOUNTER — OFFICE VISIT (OUTPATIENT)
Dept: RADIATION ONCOLOGY | Facility: CLINIC | Age: 61
End: 2021-11-17
Payer: COMMERCIAL

## 2021-11-17 ENCOUNTER — SOCIAL WORK (OUTPATIENT)
Dept: HEMATOLOGY/ONCOLOGY | Facility: CLINIC | Age: 61
End: 2021-11-17

## 2021-11-17 ENCOUNTER — OFFICE VISIT (OUTPATIENT)
Dept: HEMATOLOGY/ONCOLOGY | Facility: CLINIC | Age: 61
End: 2021-11-17
Payer: COMMERCIAL

## 2021-11-17 ENCOUNTER — TELEPHONE (OUTPATIENT)
Dept: RADIATION ONCOLOGY | Facility: CLINIC | Age: 61
End: 2021-11-17

## 2021-11-17 VITALS
RESPIRATION RATE: 18 BRPM | DIASTOLIC BLOOD PRESSURE: 74 MMHG | WEIGHT: 211 LBS | BODY MASS INDEX: 31.98 KG/M2 | HEIGHT: 68 IN | SYSTOLIC BLOOD PRESSURE: 112 MMHG | HEART RATE: 71 BPM

## 2021-11-17 VITALS
TEMPERATURE: 98 F | WEIGHT: 211.19 LBS | DIASTOLIC BLOOD PRESSURE: 68 MMHG | HEART RATE: 63 BPM | SYSTOLIC BLOOD PRESSURE: 106 MMHG | BODY MASS INDEX: 32.11 KG/M2 | OXYGEN SATURATION: 98 %

## 2021-11-17 DIAGNOSIS — Z03.818 ENCNTR FOR OBS FOR SUSP EXPSR TO OTH BIOLG AGENTS RULED OUT: Primary | ICD-10-CM

## 2021-11-17 DIAGNOSIS — D05.12 DUCTAL CARCINOMA IN SITU (DCIS) OF LEFT BREAST: ICD-10-CM

## 2021-11-17 DIAGNOSIS — D05.12 DUCTAL CARCINOMA IN SITU (DCIS) OF LEFT BREAST: Primary | ICD-10-CM

## 2021-11-17 PROCEDURE — 99205 OFFICE O/P NEW HI 60 MIN: CPT | Mod: S$GLB,,, | Performed by: RADIOLOGY

## 2021-11-17 PROCEDURE — 3074F PR MOST RECENT SYSTOLIC BLOOD PRESSURE < 130 MM HG: ICD-10-PCS | Mod: S$GLB,,, | Performed by: INTERNAL MEDICINE

## 2021-11-17 PROCEDURE — 3078F PR MOST RECENT DIASTOLIC BLOOD PRESSURE < 80 MM HG: ICD-10-PCS | Mod: S$GLB,,, | Performed by: INTERNAL MEDICINE

## 2021-11-17 PROCEDURE — 3078F PR MOST RECENT DIASTOLIC BLOOD PRESSURE < 80 MM HG: ICD-10-PCS | Mod: S$GLB,,, | Performed by: RADIOLOGY

## 2021-11-17 PROCEDURE — 99205 PR OFFICE/OUTPT VISIT, NEW, LEVL V, 60-74 MIN: ICD-10-PCS | Mod: S$GLB,,, | Performed by: RADIOLOGY

## 2021-11-17 PROCEDURE — 3078F DIAST BP <80 MM HG: CPT | Mod: S$GLB,,, | Performed by: RADIOLOGY

## 2021-11-17 PROCEDURE — 1160F PR REVIEW ALL MEDS BY PRESCRIBER/CLIN PHARMACIST DOCUMENTED: ICD-10-PCS | Mod: S$GLB,,, | Performed by: INTERNAL MEDICINE

## 2021-11-17 PROCEDURE — 3078F DIAST BP <80 MM HG: CPT | Mod: S$GLB,,, | Performed by: INTERNAL MEDICINE

## 2021-11-17 PROCEDURE — 3074F SYST BP LT 130 MM HG: CPT | Mod: S$GLB,,, | Performed by: INTERNAL MEDICINE

## 2021-11-17 PROCEDURE — 1160F RVW MEDS BY RX/DR IN RCRD: CPT | Mod: S$GLB,,, | Performed by: INTERNAL MEDICINE

## 2021-11-17 PROCEDURE — 1160F PR REVIEW ALL MEDS BY PRESCRIBER/CLIN PHARMACIST DOCUMENTED: ICD-10-PCS | Mod: S$GLB,,, | Performed by: RADIOLOGY

## 2021-11-17 PROCEDURE — 1160F RVW MEDS BY RX/DR IN RCRD: CPT | Mod: S$GLB,,, | Performed by: RADIOLOGY

## 2021-11-17 PROCEDURE — 99204 OFFICE O/P NEW MOD 45 MIN: CPT | Mod: S$GLB,,, | Performed by: INTERNAL MEDICINE

## 2021-11-17 PROCEDURE — 3008F PR BODY MASS INDEX (BMI) DOCUMENTED: ICD-10-PCS | Mod: S$GLB,,, | Performed by: RADIOLOGY

## 2021-11-17 PROCEDURE — 99204 PR OFFICE/OUTPT VISIT, NEW, LEVL IV, 45-59 MIN: ICD-10-PCS | Mod: S$GLB,,, | Performed by: INTERNAL MEDICINE

## 2021-11-17 PROCEDURE — 3074F SYST BP LT 130 MM HG: CPT | Mod: S$GLB,,, | Performed by: RADIOLOGY

## 2021-11-17 PROCEDURE — 3008F PR BODY MASS INDEX (BMI) DOCUMENTED: ICD-10-PCS | Mod: S$GLB,,, | Performed by: INTERNAL MEDICINE

## 2021-11-17 PROCEDURE — 3008F BODY MASS INDEX DOCD: CPT | Mod: S$GLB,,, | Performed by: INTERNAL MEDICINE

## 2021-11-17 PROCEDURE — 3008F BODY MASS INDEX DOCD: CPT | Mod: S$GLB,,, | Performed by: RADIOLOGY

## 2021-11-17 PROCEDURE — 3074F PR MOST RECENT SYSTOLIC BLOOD PRESSURE < 130 MM HG: ICD-10-PCS | Mod: S$GLB,,, | Performed by: RADIOLOGY

## 2021-11-27 ENCOUNTER — LAB VISIT (OUTPATIENT)
Dept: PRIMARY CARE CLINIC | Facility: CLINIC | Age: 61
End: 2021-11-27
Payer: COMMERCIAL

## 2021-11-27 DIAGNOSIS — Z03.818 ENCNTR FOR OBS FOR SUSP EXPSR TO OTH BIOLG AGENTS RULED OUT: ICD-10-CM

## 2021-11-27 PROCEDURE — U0003 INFECTIOUS AGENT DETECTION BY NUCLEIC ACID (DNA OR RNA); SEVERE ACUTE RESPIRATORY SYNDROME CORONAVIRUS 2 (SARS-COV-2) (CORONAVIRUS DISEASE [COVID-19]), AMPLIFIED PROBE TECHNIQUE, MAKING USE OF HIGH THROUGHPUT TECHNOLOGIES AS DESCRIBED BY CMS-2020-01-R: HCPCS | Performed by: RADIOLOGY

## 2021-11-27 PROCEDURE — U0005 INFEC AGEN DETEC AMPLI PROBE: HCPCS | Performed by: RADIOLOGY

## 2021-11-28 LAB
SARS-COV-2 RNA RESP QL NAA+PROBE: NOT DETECTED
SARS-COV-2- CYCLE NUMBER: NORMAL

## 2022-01-05 ENCOUNTER — OFFICE VISIT (OUTPATIENT)
Dept: HEMATOLOGY/ONCOLOGY | Facility: CLINIC | Age: 62
End: 2022-01-05

## 2022-01-05 ENCOUNTER — OFFICE VISIT (OUTPATIENT)
Dept: HEMATOLOGY/ONCOLOGY | Facility: CLINIC | Age: 62
End: 2022-01-05
Payer: COMMERCIAL

## 2022-01-05 VITALS
WEIGHT: 212 LBS | SYSTOLIC BLOOD PRESSURE: 121 MMHG | HEART RATE: 86 BPM | BODY MASS INDEX: 32.13 KG/M2 | TEMPERATURE: 98 F | DIASTOLIC BLOOD PRESSURE: 69 MMHG | RESPIRATION RATE: 18 BRPM | HEIGHT: 68 IN

## 2022-01-05 DIAGNOSIS — D05.12 DUCTAL CARCINOMA IN SITU (DCIS) OF LEFT BREAST: ICD-10-CM

## 2022-01-05 DIAGNOSIS — Z79.811 LONG TERM (CURRENT) USE OF AROMATASE INHIBITORS: Primary | ICD-10-CM

## 2022-01-05 PROCEDURE — 3078F DIAST BP <80 MM HG: CPT | Mod: S$GLB,,, | Performed by: INTERNAL MEDICINE

## 2022-01-05 PROCEDURE — 3074F PR MOST RECENT SYSTOLIC BLOOD PRESSURE < 130 MM HG: ICD-10-PCS | Mod: S$GLB,,, | Performed by: INTERNAL MEDICINE

## 2022-01-05 PROCEDURE — 3008F BODY MASS INDEX DOCD: CPT | Mod: S$GLB,,, | Performed by: INTERNAL MEDICINE

## 2022-01-05 PROCEDURE — 3074F SYST BP LT 130 MM HG: CPT | Mod: S$GLB,,, | Performed by: INTERNAL MEDICINE

## 2022-01-05 PROCEDURE — 3078F PR MOST RECENT DIASTOLIC BLOOD PRESSURE < 80 MM HG: ICD-10-PCS | Mod: S$GLB,,, | Performed by: INTERNAL MEDICINE

## 2022-01-05 PROCEDURE — 99214 OFFICE O/P EST MOD 30 MIN: CPT | Mod: S$GLB,,, | Performed by: INTERNAL MEDICINE

## 2022-01-05 PROCEDURE — 3008F PR BODY MASS INDEX (BMI) DOCUMENTED: ICD-10-PCS | Mod: S$GLB,,, | Performed by: INTERNAL MEDICINE

## 2022-01-05 PROCEDURE — 99214 PR OFFICE/OUTPT VISIT, EST, LEVL IV, 30-39 MIN: ICD-10-PCS | Mod: S$GLB,,, | Performed by: INTERNAL MEDICINE

## 2022-01-05 RX ORDER — ANASTROZOLE 1 MG/1
1 TABLET ORAL DAILY
Qty: 90 TABLET | Refills: 3 | Status: SHIPPED | OUTPATIENT
Start: 2022-01-05 | End: 2023-01-06

## 2022-01-05 NOTE — PROGRESS NOTES
PROGRESS NOTE    Subjective:       Patient ID: Paula Hernadez is a 61 y.o. female.    9/29/2021  Dx Mammo:  microcalc in left breast at 4-5 o'clock area.     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     1/19/2022  Will complete radiation therapy    2/1/2022  To begin Arimidex    Chief Complaint:  No chief complaint on file.  DCIS follow up    History of Present Illness:   Paula Hernadez is a 61 y.o. female who presents for follow up of above.      She is on radiation therapy.  Has started to have a rash on chest.        Family and Social history reviewed and is unchanged from 11/17/2021      ROS:  Review of Systems   Constitutional: Negative for fever.   Respiratory: Negative for shortness of breath.    Cardiovascular: Negative for chest pain and leg swelling.   Gastrointestinal: Negative for abdominal pain and blood in stool.   Genitourinary: Negative for hematuria.   Skin: Negative for rash.          Current Outpatient Medications:     anastrozole (ARIMIDEX) 1 mg Tab, Take 1 tablet (1 mg total) by mouth once daily., Disp: 90 tablet, Rfl: 3    ibuprofen (ADVIL,MOTRIN) 200 MG tablet, Take 200 mg by mouth every 6 (six) hours as needed. , Disp: , Rfl:     LINZESS 290 mcg Cap capsule, Take 290 mcg by mouth once daily., Disp: , Rfl:     omeprazole (PRILOSEC) 20 MG capsule, Take 20 mg by mouth once daily. , Disp: , Rfl:         Objective:       Physical Examination:     LMP 01/01/2012 Comment: menopause    Physical Exam  Constitutional:       Appearance: She is well-developed and well-nourished.   HENT:      Head: Normocephalic and atraumatic.      Right Ear: External ear normal.      Left Ear: External ear normal.      Mouth/Throat:      Mouth: Oropharynx is clear and moist.   Eyes:      Conjunctiva/sclera: Conjunctivae normal.      Pupils: Pupils are equal,  round, and reactive to light.   Neck:      Thyroid: No thyromegaly.      Trachea: No tracheal deviation.   Cardiovascular:      Rate and Rhythm: Normal rate and regular rhythm.      Heart sounds: Normal heart sounds.   Pulmonary:      Effort: Pulmonary effort is normal.      Breath sounds: Normal breath sounds.   Abdominal:      General: Bowel sounds are normal. There is no distension.      Palpations: Abdomen is soft. There is no mass.      Tenderness: There is no abdominal tenderness.   Musculoskeletal:         General: No edema.   Skin:     Findings: No rash.   Neurological:      Comments: Neuro intact througout   Psychiatric:         Mood and Affect: Mood and affect normal.         Behavior: Behavior normal.         Thought Content: Thought content normal.         Judgment: Judgment normal.         Labs:   No results found for this or any previous visit (from the past 336 hour(s)).  CMP  Sodium   Date Value Ref Range Status   10/15/2021 142 136 - 145 mmol/L Final     Potassium   Date Value Ref Range Status   10/15/2021 4.5 3.5 - 5.1 mmol/L Final     Chloride   Date Value Ref Range Status   10/15/2021 107 95 - 110 mmol/L Final     CO2   Date Value Ref Range Status   10/15/2021 26 23 - 29 mmol/L Final     Glucose   Date Value Ref Range Status   10/15/2021 80 70 - 110 mg/dL Final     BUN   Date Value Ref Range Status   10/15/2021 17 8 - 23 mg/dL Final     Creatinine   Date Value Ref Range Status   10/15/2021 0.8 0.5 - 1.4 mg/dL Final     Calcium   Date Value Ref Range Status   10/15/2021 9.5 8.7 - 10.5 mg/dL Final     Total Protein   Date Value Ref Range Status   05/27/2021 7.2 6.0 - 8.4 g/dL Final     Albumin   Date Value Ref Range Status   05/27/2021 3.8 3.5 - 5.2 g/dL Final     Total Bilirubin   Date Value Ref Range Status   05/27/2021 0.8 0.1 - 1.0 mg/dL Final     Comment:     For infants and newborns, interpretation of results should be based  on gestational age, weight and in agreement with  clinical  observations.    Premature Infant recommended reference ranges:  Up to 24 hours.............<8.0 mg/dL  Up to 48 hours............<12.0 mg/dL  3-5 days..................<15.0 mg/dL  6-29 days.................<15.0 mg/dL       Alkaline Phosphatase   Date Value Ref Range Status   05/27/2021 78 55 - 135 U/L Final     AST   Date Value Ref Range Status   05/27/2021 29 10 - 40 U/L Final     ALT   Date Value Ref Range Status   05/27/2021 20 10 - 44 U/L Final     Anion Gap   Date Value Ref Range Status   10/15/2021 9 8 - 16 mmol/L Final     eGFR if    Date Value Ref Range Status   10/15/2021 >60.0 >60 mL/min/1.73 m^2 Final     eGFR if non    Date Value Ref Range Status   10/15/2021 >60.0 >60 mL/min/1.73 m^2 Final     Comment:     Calculation used to obtain the estimated glomerular filtration  rate (eGFR) is the CKD-EPI equation.        No results found for: CEA  No results found for: PSA        Assessment/Plan:     Problem List Items Addressed This Visit     DCIS-Left Breast-ER/OR pos     Patient is on radiation therapy and is doing well at this time.  I discussed the risks and benefits of Arimidex use in DCIS and she is willing to try this medication.  I will have her begin this after radiation therapy and on 2/1/2022.  Will get baseline dexa scan as well.  Spent > 25 min in total care today.                Discussion:     Follow up in about 2 months (around 3/5/2022).      Electronically signed by Mike Lincoln

## 2022-01-05 NOTE — ASSESSMENT & PLAN NOTE
Patient is on radiation therapy and is doing well at this time.  I discussed the risks and benefits of Arimidex use in DCIS and she is willing to try this medication.  I will have her begin this after radiation therapy and on 2/1/2022.  Will get baseline dexa scan as well.  Spent > 25 min in total care today.

## 2022-01-10 DIAGNOSIS — D05.12 DUCTAL CARCINOMA IN SITU (DCIS) OF LEFT BREAST: Primary | ICD-10-CM

## 2022-01-10 RX ORDER — SILVER SULFADIAZINE 10 G/1000G
CREAM TOPICAL 2 TIMES DAILY
Qty: 400 G | Refills: 2 | Status: SHIPPED | OUTPATIENT
Start: 2022-01-10 | End: 2023-10-11

## 2022-01-19 ENCOUNTER — TREATMENT (OUTPATIENT)
Dept: RADIATION ONCOLOGY | Facility: CLINIC | Age: 62
End: 2022-01-19
Payer: COMMERCIAL

## 2022-01-19 ENCOUNTER — HOSPITAL ENCOUNTER (OUTPATIENT)
Dept: RADIOLOGY | Facility: HOSPITAL | Age: 62
Discharge: HOME | End: 2022-01-19
Attending: INTERNAL MEDICINE
Payer: COMMERCIAL

## 2022-01-19 DIAGNOSIS — D05.12 DUCTAL CARCINOMA IN SITU (DCIS) OF LEFT BREAST: ICD-10-CM

## 2022-01-19 DIAGNOSIS — Z79.811 LONG TERM (CURRENT) USE OF AROMATASE INHIBITORS: ICD-10-CM

## 2022-01-19 PROCEDURE — G6012 RADIATION TREATMENT DELIVERY: HCPCS | Mod: S$GLB,,, | Performed by: RADIOLOGY

## 2022-01-19 PROCEDURE — 77080 DXA BONE DENSITY AXIAL: CPT | Mod: TC,PO

## 2022-01-19 PROCEDURE — 77336 RADIATION PHYSICS CONSULT: CPT | Mod: S$GLB,,, | Performed by: RADIOLOGY

## 2022-01-19 PROCEDURE — G6012 PR RADN TX DELIVERY, 6-10 MEV, >= 3 TX AREAS: ICD-10-PCS | Mod: S$GLB,,, | Performed by: RADIOLOGY

## 2022-01-19 PROCEDURE — 77336 PR  RADN PHYSICS CONSULT CONTINUING: ICD-10-PCS | Mod: S$GLB,,, | Performed by: RADIOLOGY

## 2022-02-07 ENCOUNTER — CLINICAL SUPPORT (OUTPATIENT)
Dept: RADIATION ONCOLOGY | Facility: CLINIC | Age: 62
End: 2022-02-07
Payer: COMMERCIAL

## 2022-02-07 DIAGNOSIS — D05.12 DUCTAL CARCINOMA IN SITU (DCIS) OF LEFT BREAST: Primary | ICD-10-CM

## 2022-02-07 NOTE — PROGRESS NOTES
DIAGNOSIS: 0, pTisNxMx R0 g2 DCIS LOQ L breast, ER+/NH+    TREATMENT  Completed adjuvant treatment, 4680 cGy to left breast followed by 1400 cGy boost to lumpectomy site tolerated with mild fatigue and expected radiation dermatitis.    Contacted patient today for 3 week checkup.  Patient denies fatigue, pain or discomfort within the left breast and endorses good range of motion the swelling of the left upper extremity.  She is applying Cetaphil moisturizer.    A/P  1.  Transition to moisturizer containing vitamin E with daily massages the left breast and range of motion exercises of left upper extremity.  2.  Follow-up with Brenden Patel and Diaz.  Cleared to begin Arimidex.  3.  Return to clinic in 6 months. 6m MMG ordered  4.  COVID-19 precautions discussed.

## 2022-02-08 ENCOUNTER — TELEPHONE (OUTPATIENT)
Dept: RADIATION ONCOLOGY | Facility: CLINIC | Age: 62
End: 2022-02-08
Payer: COMMERCIAL

## 2022-02-08 DIAGNOSIS — D05.12 DUCTAL CARCINOMA IN SITU (DCIS) OF LEFT BREAST: Primary | ICD-10-CM

## 2022-02-25 ENCOUNTER — TELEPHONE (OUTPATIENT)
Dept: HEMATOLOGY/ONCOLOGY | Facility: CLINIC | Age: 62
End: 2022-02-25
Payer: COMMERCIAL

## 2022-02-25 NOTE — TELEPHONE ENCOUNTER
Left  for pt notifying her that her bone density was normal and that we will repeat in 2 years. Call back if she has any questions.

## 2022-03-10 ENCOUNTER — OFFICE VISIT (OUTPATIENT)
Dept: DERMATOLOGY | Facility: CLINIC | Age: 62
End: 2022-03-10
Payer: COMMERCIAL

## 2022-03-10 DIAGNOSIS — L82.1 SEBORRHEIC KERATOSES: ICD-10-CM

## 2022-03-10 DIAGNOSIS — D22.9 BENIGN NEVUS: ICD-10-CM

## 2022-03-10 DIAGNOSIS — D18.01 CHERRY ANGIOMA: ICD-10-CM

## 2022-03-10 DIAGNOSIS — D48.5 NEOPLASM OF UNCERTAIN BEHAVIOR OF SKIN: Primary | ICD-10-CM

## 2022-03-10 DIAGNOSIS — L81.4 LENTIGO: ICD-10-CM

## 2022-03-10 PROCEDURE — 11102 TANGNTL BX SKIN SINGLE LES: CPT | Mod: S$GLB,,, | Performed by: STUDENT IN AN ORGANIZED HEALTH CARE EDUCATION/TRAINING PROGRAM

## 2022-03-10 PROCEDURE — 1159F PR MEDICATION LIST DOCUMENTED IN MEDICAL RECORD: ICD-10-PCS | Mod: CPTII,S$GLB,, | Performed by: STUDENT IN AN ORGANIZED HEALTH CARE EDUCATION/TRAINING PROGRAM

## 2022-03-10 PROCEDURE — 88305 TISSUE EXAM BY PATHOLOGIST: ICD-10-PCS | Mod: 26,,, | Performed by: PATHOLOGY

## 2022-03-10 PROCEDURE — 88305 TISSUE EXAM BY PATHOLOGIST: CPT | Mod: 26,,, | Performed by: PATHOLOGY

## 2022-03-10 PROCEDURE — 99999 PR PBB SHADOW E&M-EST. PATIENT-LVL III: ICD-10-PCS | Mod: PBBFAC,,, | Performed by: STUDENT IN AN ORGANIZED HEALTH CARE EDUCATION/TRAINING PROGRAM

## 2022-03-10 PROCEDURE — 99999 PR PBB SHADOW E&M-EST. PATIENT-LVL III: CPT | Mod: PBBFAC,,, | Performed by: STUDENT IN AN ORGANIZED HEALTH CARE EDUCATION/TRAINING PROGRAM

## 2022-03-10 PROCEDURE — 99213 PR OFFICE/OUTPT VISIT, EST, LEVL III, 20-29 MIN: ICD-10-PCS | Mod: 25,S$GLB,, | Performed by: STUDENT IN AN ORGANIZED HEALTH CARE EDUCATION/TRAINING PROGRAM

## 2022-03-10 PROCEDURE — 99213 OFFICE O/P EST LOW 20 MIN: CPT | Mod: 25,S$GLB,, | Performed by: STUDENT IN AN ORGANIZED HEALTH CARE EDUCATION/TRAINING PROGRAM

## 2022-03-10 PROCEDURE — 88342 CHG IMMUNOCYTOCHEMISTRY: ICD-10-PCS | Mod: 26,,, | Performed by: PATHOLOGY

## 2022-03-10 PROCEDURE — 11102 PR TANGENTIAL BIOPSY, SKIN, SINGLE LESION: ICD-10-PCS | Mod: S$GLB,,, | Performed by: STUDENT IN AN ORGANIZED HEALTH CARE EDUCATION/TRAINING PROGRAM

## 2022-03-10 PROCEDURE — 88342 IMHCHEM/IMCYTCHM 1ST ANTB: CPT | Mod: 26,,, | Performed by: PATHOLOGY

## 2022-03-10 PROCEDURE — 1159F MED LIST DOCD IN RCRD: CPT | Mod: CPTII,S$GLB,, | Performed by: STUDENT IN AN ORGANIZED HEALTH CARE EDUCATION/TRAINING PROGRAM

## 2022-03-10 PROCEDURE — 88342 IMHCHEM/IMCYTCHM 1ST ANTB: CPT | Performed by: PATHOLOGY

## 2022-03-10 PROCEDURE — 1160F PR REVIEW ALL MEDS BY PRESCRIBER/CLIN PHARMACIST DOCUMENTED: ICD-10-PCS | Mod: CPTII,S$GLB,, | Performed by: STUDENT IN AN ORGANIZED HEALTH CARE EDUCATION/TRAINING PROGRAM

## 2022-03-10 PROCEDURE — 1160F RVW MEDS BY RX/DR IN RCRD: CPT | Mod: CPTII,S$GLB,, | Performed by: STUDENT IN AN ORGANIZED HEALTH CARE EDUCATION/TRAINING PROGRAM

## 2022-03-10 PROCEDURE — 88305 TISSUE EXAM BY PATHOLOGIST: CPT | Performed by: PATHOLOGY

## 2022-03-10 NOTE — PROGRESS NOTES
Subjective:       Patient ID:  Paula Hernadez is a 62 y.o. female who presents for   Chief Complaint   Patient presents with    Skin Check     TBSE    Spot     chest     New patient    Patient here today requesting skin check, TBSE. C/o spot to mid chest x 1 week. Raised and rough. No treatment.    Denies Phx NMSC      Review of Systems   Constitutional: Negative for fever and chills.   Respiratory: Negative for cough and shortness of breath.    Gastrointestinal: Negative for nausea and vomiting.   Skin: Positive for daily sunscreen use and activity-related sunscreen use. Negative for wears hat.   Hematologic/Lymphatic: Does not bruise/bleed easily.        Objective:    Physical Exam   Constitutional: She appears well-developed and well-nourished. No distress.   Neurological: She is alert and oriented to person, place, and time. She is not disoriented.   Psychiatric: She has a normal mood and affect.   Skin:   Areas Examined (abnormalities noted in diagram):   Scalp / Hair Palpated and Inspected  Head / Face Inspection Performed  Neck Inspection Performed  Chest / Axilla Inspection Performed  Abdomen Inspection Performed  Genitals / Buttocks / Groin Inspection Performed  Back Inspection Performed  RUE Inspected  LUE Inspection Performed  RLE Inspected  LLE Inspection Performed  Nails and Digits Inspection Performed                   Diagram Legend     Erythematous scaling macule/papule c/w actinic keratosis       Vascular papule c/w angioma      Pigmented verrucoid papule/plaque c/w seborrheic keratosis      Yellow umbilicated papule c/w sebaceous hyperplasia      Irregularly shaped tan macule c/w lentigo     1-2 mm smooth white papules consistent with Milia      Movable subcutaneous cyst with punctum c/w epidermal inclusion cyst      Subcutaneous movable cyst c/w pilar cyst      Firm pink to brown papule c/w dermatofibroma      Pedunculated fleshy papule(s) c/w skin tag(s)      Evenly pigmented macule c/w  junctional nevus     Mildly variegated pigmented, slightly irregular-bordered macule c/w mildly atypical nevus      Flesh colored to evenly pigmented papule c/w intradermal nevus       Pink pearly papule/plaque c/w basal cell carcinoma      Erythematous hyperkeratotic cursted plaque c/w SCC      Surgical scar with no sign of skin cancer recurrence      Open and closed comedones      Inflammatory papules and pustules      Verrucoid papule consistent consistent with wart     Erythematous eczematous patches and plaques     Dystrophic onycholytic nail with subungual debris c/w onychomycosis     Umbilicated papule    Erythematous-base heme-crusted tan verrucoid plaque consistent with inflamed seborrheic keratosis     Erythematous Silvery Scaling Plaque c/w Psoriasis     See annotation          Assessment / Plan:      Pathology Orders:     Normal Orders This Visit    Specimen to Pathology, Dermatology     Comments:    Number of Specimens:->1  ------------------------->-------------------------  Spec 1 Procedure:->Biopsy  Spec 1 Clinical Impression:->ISK vs. VV vs. SCC  Spec 1 Source:->central chest    Questions:    Procedure Type: Dermatology and skin neoplasms    Number of Specimens: 1    ------------------------: -------------------------    Spec 1 Procedure: Biopsy    Spec 1 Clinical Impression: ISK vs. VV vs. SCC    Spec 1 Source: central chest    Release to patient:         Neoplasm of uncertain behavior of skin  -     Specimen to Pathology, Dermatology  Shave biopsy procedure note:    Shave biopsy performed after verbal consent including risk of infection, scar, recurrence, need for additional treatment of site. Area prepped with alcohol, anesthetized with approximately 2.0cc of 1% lidocaine with epinephrine. Lesional tissue shaved with razor blade. Hemostasis achieved with application of aluminum chloride followed by hyfrecation. No complications. Dressing applied. Wound care explained.    Seborrheic  keratoses  These are benign inherited growths without a malignant potential. Reassurance given to patient. No treatment is necessary.     Lentigo  This is a benign hyperpigmented sun induced lesion. Daily sun protection will reduce the number of new lesions. Treatment of these benign lesions are considered cosmetic.      Cherry angioma  This is a benign vascular lesion. Reassurance given. No treatment required.     Benign nevus  Careful dermoscopy evaluation of nevi performed with none identified as needing biopsy today  Monitor for new mole or moles that are becoming bigger, darker, irritated, or developing irregular borders.   Total body skin examination performed today including at least 12 points as noted in physical examination. Suspicious lesions noted.  Patient instructed in importance in daily broad spectrum sun protection of at least spf 30. Mineral sunscreen ingredients preferred (Zinc +/- Titanium) and can be found OTC.   Patient encouraged to wear hat for all outdoor exposure.   Also discussed sun avoidance and use of protective clothing.             Fu pending biopsy  No follow-ups on file.

## 2022-03-10 NOTE — PATIENT INSTRUCTIONS
Shave Biopsy Wound Care    Your doctor has performed a shave biopsy today.  A band aid and vaseline ointment has been placed over the site.  This should remain in place for 24 hours.  It is recommended that you keep the area dry for the first 24 hours.  After 24 hours, you may remove the band aid and wash the area with warm soap and water and apply Vaseline jelly.  Many patients prefer to use Neosporin or Bacitracin ointment.  This is acceptable; however, know that you can develop an allergy to this medication even if you have used it safely for years.  It is important to keep the area moist.  Letting it dry out and get air slows healing time, and will worsen the scar.  Band aid is optional after first 24 hours.      If you notice increasing redness, tenderness, pain, or yellow drainage at the biopsy site, please notify your doctor.  These are signs of an infection.    If your biopsy site is bleeding, apply firm pressure for 15 minutes straight.  Repeat for another 15 minutes, if it is still bleeding.   If the surgical site continues to bleed, then please contact your doctor.      [facility] [place] [address]/ [phone] FAX/ [email or web address]

## 2022-03-15 ENCOUNTER — PATIENT MESSAGE (OUTPATIENT)
Dept: DERMATOLOGY | Facility: CLINIC | Age: 62
End: 2022-03-15
Payer: COMMERCIAL

## 2022-03-23 ENCOUNTER — OFFICE VISIT (OUTPATIENT)
Dept: HEMATOLOGY/ONCOLOGY | Facility: CLINIC | Age: 62
End: 2022-03-23
Payer: COMMERCIAL

## 2022-03-23 VITALS
RESPIRATION RATE: 18 BRPM | SYSTOLIC BLOOD PRESSURE: 130 MMHG | HEIGHT: 68 IN | TEMPERATURE: 98 F | BODY MASS INDEX: 32.23 KG/M2 | DIASTOLIC BLOOD PRESSURE: 77 MMHG | HEART RATE: 86 BPM

## 2022-03-23 DIAGNOSIS — D05.12 DUCTAL CARCINOMA IN SITU (DCIS) OF LEFT BREAST: ICD-10-CM

## 2022-03-23 PROCEDURE — 3078F DIAST BP <80 MM HG: CPT | Mod: S$GLB,,, | Performed by: INTERNAL MEDICINE

## 2022-03-23 PROCEDURE — 3075F PR MOST RECENT SYSTOLIC BLOOD PRESS GE 130-139MM HG: ICD-10-PCS | Mod: S$GLB,,, | Performed by: INTERNAL MEDICINE

## 2022-03-23 PROCEDURE — 3075F SYST BP GE 130 - 139MM HG: CPT | Mod: S$GLB,,, | Performed by: INTERNAL MEDICINE

## 2022-03-23 PROCEDURE — 99213 PR OFFICE/OUTPT VISIT, EST, LEVL III, 20-29 MIN: ICD-10-PCS | Mod: S$GLB,,, | Performed by: INTERNAL MEDICINE

## 2022-03-23 PROCEDURE — 3008F BODY MASS INDEX DOCD: CPT | Mod: S$GLB,,, | Performed by: INTERNAL MEDICINE

## 2022-03-23 PROCEDURE — 1160F PR REVIEW ALL MEDS BY PRESCRIBER/CLIN PHARMACIST DOCUMENTED: ICD-10-PCS | Mod: S$GLB,,, | Performed by: INTERNAL MEDICINE

## 2022-03-23 PROCEDURE — 1160F RVW MEDS BY RX/DR IN RCRD: CPT | Mod: S$GLB,,, | Performed by: INTERNAL MEDICINE

## 2022-03-23 PROCEDURE — 99213 OFFICE O/P EST LOW 20 MIN: CPT | Mod: S$GLB,,, | Performed by: INTERNAL MEDICINE

## 2022-03-23 PROCEDURE — 3078F PR MOST RECENT DIASTOLIC BLOOD PRESSURE < 80 MM HG: ICD-10-PCS | Mod: S$GLB,,, | Performed by: INTERNAL MEDICINE

## 2022-03-23 PROCEDURE — 3008F PR BODY MASS INDEX (BMI) DOCUMENTED: ICD-10-PCS | Mod: S$GLB,,, | Performed by: INTERNAL MEDICINE

## 2022-03-23 NOTE — ASSESSMENT & PLAN NOTE
Patient is doing well at this time and has completed radiation therapy.  She is on AI now and is tolerating with expected toxicity.  Will plan on mammogram to be done in May and will see her again in three months with CBC, CMP.  Discussed with patient today.

## 2022-03-23 NOTE — PROGRESS NOTES
"                                                         PROGRESS NOTE    Subjective:       Patient ID: Paula Hernadez is a 62 y.o. female.    9/29/2021  Dx Mammo:  microcalc in left breast at 4-5 o'clock area.     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     1/19/2022  Radiation complete    2/9/2022  Arimidex started.     1/19/2022:  Dexa: Normal    Chief Complaint:  No chief complaint on file.  DCIS follow up    History of Present Illness:   Paula Hernadez is a 62 y.o. female who presents for follow up of above.      Patient is doing well.  She completed radiation and started on the Arimidex.  She notes some hot flashes but o/w tolerating this ok.     She remains on Arimidex as of today, 3/23/2022.        Family and Social history reviewed and is unchanged from 11/17/2021      ROS:  Review of Systems   Constitutional: Negative for fever.   Respiratory: Negative for shortness of breath.    Cardiovascular: Negative for chest pain and leg swelling.   Gastrointestinal: Negative for abdominal pain and blood in stool.   Genitourinary: Negative for hematuria.   Skin: Negative for rash.          Current Outpatient Medications:     anastrozole (ARIMIDEX) 1 mg Tab, Take 1 tablet (1 mg total) by mouth once daily., Disp: 90 tablet, Rfl: 3    ibuprofen (ADVIL,MOTRIN) 200 MG tablet, Take 200 mg by mouth every 6 (six) hours as needed. , Disp: , Rfl:     LINZESS 290 mcg Cap capsule, Take 290 mcg by mouth once daily., Disp: , Rfl:     omeprazole (PRILOSEC) 20 MG capsule, Take 20 mg by mouth once daily. , Disp: , Rfl:     silver sulfADIAZINE 1% (SILVADENE) 1 % cream, Apply topically 2 (two) times daily., Disp: 400 g, Rfl: 2        Objective:       Physical Examination:     /77   Pulse 86   Temp 97.7 °F (36.5 °C)   Resp 18   Ht 5' 8" (1.727 m)   LMP 01/01/2012 Comment: menopause  BMI 32.23 kg/m²     Physical " Exam  Constitutional:       Appearance: She is well-developed and well-nourished.   HENT:      Head: Normocephalic and atraumatic.      Right Ear: External ear normal.      Left Ear: External ear normal.      Mouth/Throat:      Mouth: Oropharynx is clear and moist.   Eyes:      Conjunctiva/sclera: Conjunctivae normal.      Pupils: Pupils are equal, round, and reactive to light.   Neck:      Thyroid: No thyromegaly.      Trachea: No tracheal deviation.   Cardiovascular:      Rate and Rhythm: Normal rate and regular rhythm.      Heart sounds: Normal heart sounds.   Pulmonary:      Effort: Pulmonary effort is normal.      Breath sounds: Normal breath sounds.   Abdominal:      General: Bowel sounds are normal. There is no distension.      Palpations: Abdomen is soft. There is no mass.      Tenderness: There is no abdominal tenderness.   Musculoskeletal:         General: No edema.   Skin:     Findings: No rash.   Neurological:      Comments: Neuro intact througout   Psychiatric:         Mood and Affect: Mood and affect normal.         Behavior: Behavior normal.         Thought Content: Thought content normal.         Judgment: Judgment normal.         Labs:   No results found for this or any previous visit (from the past 336 hour(s)).  CMP  Sodium   Date Value Ref Range Status   10/15/2021 142 136 - 145 mmol/L Final     Potassium   Date Value Ref Range Status   10/15/2021 4.5 3.5 - 5.1 mmol/L Final     Chloride   Date Value Ref Range Status   10/15/2021 107 95 - 110 mmol/L Final     CO2   Date Value Ref Range Status   10/15/2021 26 23 - 29 mmol/L Final     Glucose   Date Value Ref Range Status   10/15/2021 80 70 - 110 mg/dL Final     BUN   Date Value Ref Range Status   10/15/2021 17 8 - 23 mg/dL Final     Creatinine   Date Value Ref Range Status   10/15/2021 0.8 0.5 - 1.4 mg/dL Final     Calcium   Date Value Ref Range Status   10/15/2021 9.5 8.7 - 10.5 mg/dL Final     Total Protein   Date Value Ref Range Status    05/27/2021 7.2 6.0 - 8.4 g/dL Final     Albumin   Date Value Ref Range Status   05/27/2021 3.8 3.5 - 5.2 g/dL Final     Total Bilirubin   Date Value Ref Range Status   05/27/2021 0.8 0.1 - 1.0 mg/dL Final     Comment:     For infants and newborns, interpretation of results should be based  on gestational age, weight and in agreement with clinical  observations.    Premature Infant recommended reference ranges:  Up to 24 hours.............<8.0 mg/dL  Up to 48 hours............<12.0 mg/dL  3-5 days..................<15.0 mg/dL  6-29 days.................<15.0 mg/dL       Alkaline Phosphatase   Date Value Ref Range Status   05/27/2021 78 55 - 135 U/L Final     AST   Date Value Ref Range Status   05/27/2021 29 10 - 40 U/L Final     ALT   Date Value Ref Range Status   05/27/2021 20 10 - 44 U/L Final     Anion Gap   Date Value Ref Range Status   10/15/2021 9 8 - 16 mmol/L Final     eGFR if    Date Value Ref Range Status   10/15/2021 >60.0 >60 mL/min/1.73 m^2 Final     eGFR if non    Date Value Ref Range Status   10/15/2021 >60.0 >60 mL/min/1.73 m^2 Final     Comment:     Calculation used to obtain the estimated glomerular filtration  rate (eGFR) is the CKD-EPI equation.        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  She is on AI now and is tolerating with expected toxicity.  Will plan on mammogram to be done in May and will see her again in three months with CBC, CMP.  Discussed with patient today.            Relevant Orders    CBC Auto Differential    Comprehensive Metabolic Panel    Mammo Digital Diagnostic Bilat with [facility]          Discussion:     Follow up in about 3 months (around 6/23/2022).      Electronically signed by Mike Lincoln

## 2022-03-24 DIAGNOSIS — D05.12 DUCTAL CARCINOMA IN SITU (DCIS) OF LEFT BREAST: Primary | ICD-10-CM

## 2022-03-25 LAB
FINAL PATHOLOGIC DIAGNOSIS: NORMAL
GROSS: NORMAL
Lab: NORMAL
MICROSCOPIC EXAM: NORMAL

## 2022-06-15 ENCOUNTER — LAB VISIT (OUTPATIENT)
Dept: LAB | Facility: HOSPITAL | Age: 62
End: 2022-06-15
Attending: INTERNAL MEDICINE
Payer: COMMERCIAL

## 2022-06-15 DIAGNOSIS — D05.12 DUCTAL CARCINOMA IN SITU (DCIS) OF LEFT BREAST: ICD-10-CM

## 2022-06-15 LAB
ALBUMIN SERPL BCP-MCNC: 3.9 G/DL (ref 3.5–5.2)
ALP SERPL-CCNC: 83 U/L (ref 55–135)
ALT SERPL W/O P-5'-P-CCNC: 17 U/L (ref 10–44)
ANION GAP SERPL CALC-SCNC: 8 MMOL/L (ref 8–16)
AST SERPL-CCNC: 24 U/L (ref 10–40)
BASOPHILS # BLD AUTO: 0.02 K/UL (ref 0–0.2)
BASOPHILS NFR BLD: 0.6 % (ref 0–1.9)
BILIRUB SERPL-MCNC: 0.9 MG/DL (ref 0.1–1)
BUN SERPL-MCNC: 18 MG/DL (ref 8–23)
CALCIUM SERPL-MCNC: 9.7 MG/DL (ref 8.7–10.5)
CHLORIDE SERPL-SCNC: 102 MMOL/L (ref 95–110)
CO2 SERPL-SCNC: 30 MMOL/L (ref 23–29)
CREAT SERPL-MCNC: 0.9 MG/DL (ref 0.5–1.4)
DIFFERENTIAL METHOD: ABNORMAL
EOSINOPHIL # BLD AUTO: 0.2 K/UL (ref 0–0.5)
EOSINOPHIL NFR BLD: 5.4 % (ref 0–8)
ERYTHROCYTE [DISTWIDTH] IN BLOOD BY AUTOMATED COUNT: 13.6 % (ref 11.5–14.5)
EST. GFR  (AFRICAN AMERICAN): >60 ML/MIN/1.73 M^2
EST. GFR  (NON AFRICAN AMERICAN): >60 ML/MIN/1.73 M^2
GLUCOSE SERPL-MCNC: 88 MG/DL (ref 70–110)
HCT VFR BLD AUTO: 39.2 % (ref 37–48.5)
HGB BLD-MCNC: 13 G/DL (ref 12–16)
IMM GRANULOCYTES # BLD AUTO: 0.01 K/UL (ref 0–0.04)
IMM GRANULOCYTES NFR BLD AUTO: 0.3 % (ref 0–0.5)
LYMPHOCYTES # BLD AUTO: 1.1 K/UL (ref 1–4.8)
LYMPHOCYTES NFR BLD: 31.6 % (ref 18–48)
MCH RBC QN AUTO: 29.1 PG (ref 27–31)
MCHC RBC AUTO-ENTMCNC: 33.2 G/DL (ref 32–36)
MCV RBC AUTO: 88 FL (ref 82–98)
MONOCYTES # BLD AUTO: 0.3 K/UL (ref 0.3–1)
MONOCYTES NFR BLD: 9.3 % (ref 4–15)
NEUTROPHILS # BLD AUTO: 1.8 K/UL (ref 1.8–7.7)
NEUTROPHILS NFR BLD: 52.8 % (ref 38–73)
NRBC BLD-RTO: 0 /100 WBC
PLATELET # BLD AUTO: 186 K/UL (ref 150–450)
PMV BLD AUTO: 9.5 FL (ref 9.2–12.9)
POTASSIUM SERPL-SCNC: 4.3 MMOL/L (ref 3.5–5.1)
PROT SERPL-MCNC: 7.1 G/DL (ref 6–8.4)
RBC # BLD AUTO: 4.46 M/UL (ref 4–5.4)
SODIUM SERPL-SCNC: 140 MMOL/L (ref 136–145)
WBC # BLD AUTO: 3.35 K/UL (ref 3.9–12.7)

## 2022-06-15 PROCEDURE — 36415 COLL VENOUS BLD VENIPUNCTURE: CPT | Performed by: INTERNAL MEDICINE

## 2022-06-15 PROCEDURE — 80053 COMPREHEN METABOLIC PANEL: CPT | Performed by: INTERNAL MEDICINE

## 2022-06-15 PROCEDURE — 85025 COMPLETE CBC W/AUTO DIFF WBC: CPT | Performed by: INTERNAL MEDICINE

## 2022-06-20 ENCOUNTER — HOSPITAL ENCOUNTER (OUTPATIENT)
Dept: RADIOLOGY | Facility: HOSPITAL | Age: 62
Discharge: HOME | End: 2022-06-20
Attending: INTERNAL MEDICINE
Payer: COMMERCIAL

## 2022-06-20 VITALS — HEIGHT: 68 IN | WEIGHT: 212.06 LBS | BODY MASS INDEX: 32.14 KG/M2

## 2022-06-20 DIAGNOSIS — D05.12 DUCTAL CARCINOMA IN SITU (DCIS) OF LEFT BREAST: ICD-10-CM

## 2022-06-20 PROCEDURE — 77062 BREAST TOMOSYNTHESIS BI: CPT | Mod: TC,PO

## 2022-06-21 NOTE — PROGRESS NOTES
"                                                         PROGRESS NOTE    Subjective:       Patient ID: Paula Hernadez is a 62 y.o. female.    9/29/2021  Dx Mammo:  microcalc in left breast at 4-5 o'clock area.     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     1/19/2022  Radiation complete    2/9/2022  Arimidex started.     1/19/2022:  Dexa: Normal    6/20/2022:  Bilateral mammogram negative  Recheck left breast in 6 months(surgical changes)    Chief Complaint:  No chief complaint on file.  DCIS follow up    History of Present Illness:   Paula Hernadez is a 62 y.o. female who presents for follow up of above.      Ms. Hernadez is doing well today.  No new complaints.     She remains on Arimidex as of today, 6/22/2022       Family and Social history reviewed and is unchanged from 11/17/2021      ROS:  Review of Systems   Constitutional: Negative for fever.   Respiratory: Negative for shortness of breath.    Cardiovascular: Negative for chest pain and leg swelling.   Gastrointestinal: Negative for abdominal pain and blood in stool.   Genitourinary: Negative for hematuria.   Skin: Negative for rash.          Current Outpatient Medications:     anastrozole (ARIMIDEX) 1 mg Tab, Take 1 tablet (1 mg total) by mouth once daily., Disp: 90 tablet, Rfl: 3    ibuprofen (ADVIL,MOTRIN) 200 MG tablet, Take 200 mg by mouth every 6 (six) hours as needed. , Disp: , Rfl:     LINZESS 290 mcg Cap capsule, Take 290 mcg by mouth once daily., Disp: , Rfl:     omeprazole (PRILOSEC) 20 MG capsule, Take 20 mg by mouth once daily. , Disp: , Rfl:     silver sulfADIAZINE 1% (SILVADENE) 1 % cream, Apply topically 2 (two) times daily., Disp: 400 g, Rfl: 2    UNKNOWN TO PATIENT, 1 tablet., Disp: , Rfl:         Objective:       Physical Examination:     BP (!) 149/84   Pulse 71   Temp 98.3 °F (36.8 °C)   Resp 18   Ht 5' 8" (1.727 m)   Wt 97.5 " kg (215 lb)   LMP 01/01/2012 Comment: menopause  BMI 32.69 kg/m²     Physical Exam  Constitutional:       Appearance: She is well-developed and well-nourished.   HENT:      Head: Normocephalic and atraumatic.      Right Ear: External ear normal.      Left Ear: External ear normal.      Mouth/Throat:      Mouth: Oropharynx is clear and moist.   Eyes:      Conjunctiva/sclera: Conjunctivae normal.      Pupils: Pupils are equal, round, and reactive to light.   Neck:      Thyroid: No thyromegaly.      Trachea: No tracheal deviation.   Cardiovascular:      Rate and Rhythm: Normal rate and regular rhythm.      Heart sounds: Normal heart sounds.   Pulmonary:      Effort: Pulmonary effort is normal.      Breath sounds: Normal breath sounds.   Abdominal:      General: Bowel sounds are normal. There is no distension.      Palpations: Abdomen is soft. There is no mass.      Tenderness: There is no abdominal tenderness.   Musculoskeletal:         General: No edema.   Skin:     Findings: No rash.   Neurological:      Comments: Neuro intact througout   Psychiatric:         Mood and Affect: Mood and affect normal.         Behavior: Behavior normal.         Thought Content: Thought content normal.         Judgment: Judgment normal.    BREAST:Breast exam is negative bilaterally.  No masses, no rash, no skin changes. No adenopathy is present in the cervical, supraclavicular, infraclavicular or axillary regions.            Labs:   Recent Results (from the past 336 hour(s))   CBC Auto Differential    Collection Time: 06/15/22  7:42 AM   Result Value Ref Range    WBC 3.35 (L) 3.90 - 12.70 K/uL    Hemoglobin 13.0 12.0 - 16.0 g/dL    Hematocrit 39.2 37.0 - 48.5 %    Platelets 186 150 - 450 K/uL     CMP  Sodium   Date Value Ref Range Status   06/15/2022 140 136 - 145 mmol/L Final     Potassium   Date Value Ref Range Status   06/15/2022 4.3 3.5 - 5.1 mmol/L Final     Chloride   Date Value Ref Range Status   06/15/2022 102 95 - 110 mmol/L  Final     CO2   Date Value Ref Range Status   06/15/2022 30 (H) 23 - 29 mmol/L Final     Glucose   Date Value Ref Range Status   06/15/2022 88 70 - 110 mg/dL Final     BUN   Date Value Ref Range Status   06/15/2022 18 8 - 23 mg/dL Final     Creatinine   Date Value Ref Range Status   06/15/2022 0.9 0.5 - 1.4 mg/dL Final     Calcium   Date Value Ref Range Status   06/15/2022 9.7 8.7 - 10.5 mg/dL Final     Total Protein   Date Value Ref Range Status   06/15/2022 7.1 6.0 - 8.4 g/dL Final     Albumin   Date Value Ref Range Status   06/15/2022 3.9 3.5 - 5.2 g/dL Final     Total Bilirubin   Date Value Ref Range Status   06/15/2022 0.9 0.1 - 1.0 mg/dL Final     Comment:     For infants and newborns, interpretation of results should be based  on gestational age, weight and in agreement with clinical  observations.    Premature Infant recommended reference ranges:  Up to 24 hours.............<8.0 mg/dL  Up to 48 hours............<12.0 mg/dL  3-5 days..................<15.0 mg/dL  6-29 days.................<15.0 mg/dL       Alkaline Phosphatase   Date Value Ref Range Status   06/15/2022 83 55 - 135 U/L Final     AST   Date Value Ref Range Status   06/15/2022 24 10 - 40 U/L Final     ALT   Date Value Ref Range Status   06/15/2022 17 10 - 44 U/L Final     Anion Gap   Date Value Ref Range Status   06/15/2022 8 8 - 16 mmol/L Final     eGFR if    Date Value Ref Range Status   06/15/2022 >60.0 >60 mL/min/1.73 m^2 Final     eGFR if non    Date Value Ref Range Status   06/15/2022 >60.0 >60 mL/min/1.73 m^2 Final     Comment:     Calculation used to obtain the estimated glomerular filtration  rate (eGFR) is the CKD-EPI equation.        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  Continues on Arimidex and is ZENA at this time.  Will get left mammogram as suggested on  mammo report but don't suspect any malignant issues here.  Will plan on seeing her after this is done in 6 months.            Relevant Orders    Mammo Digital Diagnostic Left with [facility]    CBC Auto Differential    Comprehensive Metabolic Panel          Discussion:     Follow up in about 6 months (around 12/22/2022).      Electronically signed by Mike Lincoln

## 2022-06-22 ENCOUNTER — OFFICE VISIT (OUTPATIENT)
Dept: HEMATOLOGY/ONCOLOGY | Facility: CLINIC | Age: 62
End: 2022-06-22
Payer: COMMERCIAL

## 2022-06-22 VITALS
DIASTOLIC BLOOD PRESSURE: 84 MMHG | WEIGHT: 215 LBS | TEMPERATURE: 98 F | SYSTOLIC BLOOD PRESSURE: 149 MMHG | BODY MASS INDEX: 32.58 KG/M2 | HEART RATE: 71 BPM | HEIGHT: 68 IN | RESPIRATION RATE: 18 BRPM

## 2022-06-22 DIAGNOSIS — D05.12 DUCTAL CARCINOMA IN SITU OF LEFT BREAST: Primary | ICD-10-CM

## 2022-06-22 DIAGNOSIS — D05.12 DUCTAL CARCINOMA IN SITU (DCIS) OF LEFT BREAST: ICD-10-CM

## 2022-06-22 PROCEDURE — 1160F RVW MEDS BY RX/DR IN RCRD: CPT | Mod: CPTII,S$GLB,, | Performed by: INTERNAL MEDICINE

## 2022-06-22 PROCEDURE — 3077F SYST BP >= 140 MM HG: CPT | Mod: CPTII,S$GLB,, | Performed by: INTERNAL MEDICINE

## 2022-06-22 PROCEDURE — 3008F BODY MASS INDEX DOCD: CPT | Mod: CPTII,S$GLB,, | Performed by: INTERNAL MEDICINE

## 2022-06-22 PROCEDURE — 3008F PR BODY MASS INDEX (BMI) DOCUMENTED: ICD-10-PCS | Mod: CPTII,S$GLB,, | Performed by: INTERNAL MEDICINE

## 2022-06-22 PROCEDURE — 3077F PR MOST RECENT SYSTOLIC BLOOD PRESSURE >= 140 MM HG: ICD-10-PCS | Mod: CPTII,S$GLB,, | Performed by: INTERNAL MEDICINE

## 2022-06-22 PROCEDURE — 1159F MED LIST DOCD IN RCRD: CPT | Mod: CPTII,S$GLB,, | Performed by: INTERNAL MEDICINE

## 2022-06-22 PROCEDURE — 1159F PR MEDICATION LIST DOCUMENTED IN MEDICAL RECORD: ICD-10-PCS | Mod: CPTII,S$GLB,, | Performed by: INTERNAL MEDICINE

## 2022-06-22 PROCEDURE — 99213 OFFICE O/P EST LOW 20 MIN: CPT | Mod: S$GLB,,, | Performed by: INTERNAL MEDICINE

## 2022-06-22 PROCEDURE — 3079F PR MOST RECENT DIASTOLIC BLOOD PRESSURE 80-89 MM HG: ICD-10-PCS | Mod: CPTII,S$GLB,, | Performed by: INTERNAL MEDICINE

## 2022-06-22 PROCEDURE — 1160F PR REVIEW ALL MEDS BY PRESCRIBER/CLIN PHARMACIST DOCUMENTED: ICD-10-PCS | Mod: CPTII,S$GLB,, | Performed by: INTERNAL MEDICINE

## 2022-06-22 PROCEDURE — 3079F DIAST BP 80-89 MM HG: CPT | Mod: CPTII,S$GLB,, | Performed by: INTERNAL MEDICINE

## 2022-06-22 PROCEDURE — 99213 PR OFFICE/OUTPT VISIT, EST, LEVL III, 20-29 MIN: ICD-10-PCS | Mod: S$GLB,,, | Performed by: INTERNAL MEDICINE

## 2022-06-22 NOTE — ASSESSMENT & PLAN NOTE
Patient doing well today.  Continues on Arimidex and is ZENA at this time.  Will get left mammogram as suggested on mammo report but don't suspect any malignant issues here.  Will plan on seeing her after this is done in 6 months.

## 2022-12-12 ENCOUNTER — TELEPHONE (OUTPATIENT)
Dept: HEMATOLOGY/ONCOLOGY | Facility: CLINIC | Age: 62
End: 2022-12-12

## 2022-12-12 NOTE — TELEPHONE ENCOUNTER
Called  patient for upcoming appointment 12/21/2022 and laboratory work, patient stated understanding.

## 2022-12-14 ENCOUNTER — LAB VISIT (OUTPATIENT)
Dept: LAB | Facility: HOSPITAL | Age: 62
End: 2022-12-14
Attending: INTERNAL MEDICINE
Payer: COMMERCIAL

## 2022-12-14 ENCOUNTER — HOSPITAL ENCOUNTER (OUTPATIENT)
Dept: RADIOLOGY | Facility: HOSPITAL | Age: 62
Discharge: HOME | End: 2022-12-14
Attending: INTERNAL MEDICINE
Payer: COMMERCIAL

## 2022-12-14 DIAGNOSIS — D05.12 DUCTAL CARCINOMA IN SITU (DCIS) OF LEFT BREAST: ICD-10-CM

## 2022-12-14 DIAGNOSIS — D05.12 DUCTAL CARCINOMA IN SITU OF LEFT BREAST: ICD-10-CM

## 2022-12-14 LAB
ALBUMIN SERPL BCP-MCNC: 4.1 G/DL (ref 3.5–5.2)
ALP SERPL-CCNC: 86 U/L (ref 55–135)
ALT SERPL W/O P-5'-P-CCNC: 16 U/L (ref 10–44)
ANION GAP SERPL CALC-SCNC: 8 MMOL/L (ref 8–16)
AST SERPL-CCNC: 23 U/L (ref 10–40)
BASOPHILS # BLD AUTO: 0.02 K/UL (ref 0–0.2)
BASOPHILS NFR BLD: 0.5 % (ref 0–1.9)
BILIRUB SERPL-MCNC: 0.7 MG/DL (ref 0.1–1)
BUN SERPL-MCNC: 18 MG/DL (ref 8–23)
CALCIUM SERPL-MCNC: 9.4 MG/DL (ref 8.7–10.5)
CHLORIDE SERPL-SCNC: 103 MMOL/L (ref 95–110)
CO2 SERPL-SCNC: 28 MMOL/L (ref 23–29)
CREAT SERPL-MCNC: 0.8 MG/DL (ref 0.5–1.4)
DIFFERENTIAL METHOD: NORMAL
EOSINOPHIL # BLD AUTO: 0.2 K/UL (ref 0–0.5)
EOSINOPHIL NFR BLD: 4.8 % (ref 0–8)
ERYTHROCYTE [DISTWIDTH] IN BLOOD BY AUTOMATED COUNT: 13.8 % (ref 11.5–14.5)
EST. GFR  (NO RACE VARIABLE): >60 ML/MIN/1.73 M^2
GLUCOSE SERPL-MCNC: 87 MG/DL (ref 70–110)
HCT VFR BLD AUTO: 41.1 % (ref 37–48.5)
HGB BLD-MCNC: 13.7 G/DL (ref 12–16)
IMM GRANULOCYTES # BLD AUTO: 0.01 K/UL (ref 0–0.04)
IMM GRANULOCYTES NFR BLD AUTO: 0.3 % (ref 0–0.5)
LYMPHOCYTES # BLD AUTO: 1.5 K/UL (ref 1–4.8)
LYMPHOCYTES NFR BLD: 37.4 % (ref 18–48)
MCH RBC QN AUTO: 29.9 PG (ref 27–31)
MCHC RBC AUTO-ENTMCNC: 33.3 G/DL (ref 32–36)
MCV RBC AUTO: 90 FL (ref 82–98)
MONOCYTES # BLD AUTO: 0.4 K/UL (ref 0.3–1)
MONOCYTES NFR BLD: 8.9 % (ref 4–15)
NEUTROPHILS # BLD AUTO: 1.9 K/UL (ref 1.8–7.7)
NEUTROPHILS NFR BLD: 48.1 % (ref 38–73)
NRBC BLD-RTO: 0 /100 WBC
PLATELET # BLD AUTO: 202 K/UL (ref 150–450)
PMV BLD AUTO: 10.2 FL (ref 9.2–12.9)
POTASSIUM SERPL-SCNC: 4.3 MMOL/L (ref 3.5–5.1)
PROT SERPL-MCNC: 7.3 G/DL (ref 6–8.4)
RBC # BLD AUTO: 4.58 M/UL (ref 4–5.4)
SODIUM SERPL-SCNC: 139 MMOL/L (ref 136–145)
WBC # BLD AUTO: 3.93 K/UL (ref 3.9–12.7)

## 2022-12-14 PROCEDURE — 85025 COMPLETE CBC W/AUTO DIFF WBC: CPT | Performed by: INTERNAL MEDICINE

## 2022-12-14 PROCEDURE — 80053 COMPREHEN METABOLIC PANEL: CPT | Performed by: INTERNAL MEDICINE

## 2022-12-14 PROCEDURE — 77065 DX MAMMO INCL CAD UNI: CPT | Mod: TC,PO,LT

## 2022-12-14 PROCEDURE — 36415 COLL VENOUS BLD VENIPUNCTURE: CPT | Performed by: INTERNAL MEDICINE

## 2022-12-15 ENCOUNTER — TELEPHONE (OUTPATIENT)
Dept: HEMATOLOGY/ONCOLOGY | Facility: CLINIC | Age: 62
End: 2022-12-15

## 2022-12-15 DIAGNOSIS — R92.8 ABNORMAL MAMMOGRAM OF LEFT BREAST: Primary | ICD-10-CM

## 2023-01-11 ENCOUNTER — HOSPITAL ENCOUNTER (OUTPATIENT)
Dept: RADIOLOGY | Facility: HOSPITAL | Age: 63
Discharge: HOME | End: 2023-01-11
Attending: SURGERY
Payer: COMMERCIAL

## 2023-01-11 DIAGNOSIS — R92.0 ABNORMAL FINDING ON MAMMOGRAPHY, MICROCALCIFICATION: ICD-10-CM

## 2023-01-11 PROCEDURE — 25000003 PHARM REV CODE 250: Mod: PO | Performed by: SURGERY

## 2023-01-11 PROCEDURE — 19081 BX BREAST 1ST LESION STRTCTC: CPT | Mod: PO,LT

## 2023-01-11 RX ORDER — LIDOCAINE HYDROCHLORIDE AND EPINEPHRINE 10; 10 MG/ML; UG/ML
INJECTION, SOLUTION INFILTRATION; PERINEURAL
Status: COMPLETED | OUTPATIENT
Start: 2023-01-11 | End: 2023-01-11

## 2023-01-11 RX ADMIN — LIDOCAINE HYDROCHLORIDE,EPINEPHRINE BITARTRATE 20 ML: 10; .01 INJECTION, SOLUTION INFILTRATION; PERINEURAL at 12:01

## 2023-01-11 NOTE — PROCEDURES
Preop diagnosis left breast microcalcifications  Postop same  Procedures stereotactic left breast biopsy  Patient is placed in a sitting position stereotactic breast biopsy instrumentation the area in question was able to be localized under compression.  After prepping I used xylocaine for local anesthesia made a small stab incision biopsy needle was inserted to the appropriate position which was confirmed with x-ray.  Biopsies done and hourly positions.  X-rays specimen revealed microcalcifications within the specimen.  A clip was left marked site of the biopsy.  Patient tolerated procedure well.

## 2023-01-11 NOTE — PLAN OF CARE
1225 Breast bx completed.  Pt to well.  Steri strip, gauze and tegaderm dressing to left breast no bleeding noted.  Pt given d/c instructions and rx, verbalized understanding.  Pt amb out of dept with steady gait.

## 2023-01-11 NOTE — PLAN OF CARE
1140 Pt to stereo room for left breast bx.  Pt awake, alert, cooperative, speech clear. ID x identifiers.  Procedure explained. Consents noted to chart.  Pre procedure imaging initiated.

## 2023-01-18 ENCOUNTER — TELEPHONE (OUTPATIENT)
Dept: DERMATOLOGY | Facility: CLINIC | Age: 63
End: 2023-01-18

## 2023-01-18 DIAGNOSIS — R92.0 MAMMOGRAPHIC MICROCALCIFICATION: Primary | ICD-10-CM

## 2023-01-20 DIAGNOSIS — R92.0 MAMMOGRAPHIC MICROCALCIFICATION: Primary | ICD-10-CM

## 2023-01-25 ENCOUNTER — OFFICE VISIT (OUTPATIENT)
Dept: HEMATOLOGY/ONCOLOGY | Facility: CLINIC | Age: 63
End: 2023-01-25
Payer: COMMERCIAL

## 2023-01-25 VITALS
BODY MASS INDEX: 33.3 KG/M2 | SYSTOLIC BLOOD PRESSURE: 134 MMHG | HEART RATE: 78 BPM | TEMPERATURE: 98 F | DIASTOLIC BLOOD PRESSURE: 63 MMHG | WEIGHT: 219 LBS

## 2023-01-25 DIAGNOSIS — D05.12 DUCTAL CARCINOMA IN SITU (DCIS) OF LEFT BREAST: ICD-10-CM

## 2023-01-25 PROCEDURE — 3075F SYST BP GE 130 - 139MM HG: CPT | Mod: CPTII,S$GLB,, | Performed by: INTERNAL MEDICINE

## 2023-01-25 PROCEDURE — 3078F PR MOST RECENT DIASTOLIC BLOOD PRESSURE < 80 MM HG: ICD-10-PCS | Mod: CPTII,S$GLB,, | Performed by: INTERNAL MEDICINE

## 2023-01-25 PROCEDURE — 3008F BODY MASS INDEX DOCD: CPT | Mod: CPTII,S$GLB,, | Performed by: INTERNAL MEDICINE

## 2023-01-25 PROCEDURE — 99213 PR OFFICE/OUTPT VISIT, EST, LEVL III, 20-29 MIN: ICD-10-PCS | Mod: S$GLB,,, | Performed by: INTERNAL MEDICINE

## 2023-01-25 PROCEDURE — 3078F DIAST BP <80 MM HG: CPT | Mod: CPTII,S$GLB,, | Performed by: INTERNAL MEDICINE

## 2023-01-25 PROCEDURE — 1159F PR MEDICATION LIST DOCUMENTED IN MEDICAL RECORD: ICD-10-PCS | Mod: CPTII,S$GLB,, | Performed by: INTERNAL MEDICINE

## 2023-01-25 PROCEDURE — 3008F PR BODY MASS INDEX (BMI) DOCUMENTED: ICD-10-PCS | Mod: CPTII,S$GLB,, | Performed by: INTERNAL MEDICINE

## 2023-01-25 PROCEDURE — 99213 OFFICE O/P EST LOW 20 MIN: CPT | Mod: S$GLB,,, | Performed by: INTERNAL MEDICINE

## 2023-01-25 PROCEDURE — 3075F PR MOST RECENT SYSTOLIC BLOOD PRESS GE 130-139MM HG: ICD-10-PCS | Mod: CPTII,S$GLB,, | Performed by: INTERNAL MEDICINE

## 2023-01-25 PROCEDURE — 1159F MED LIST DOCD IN RCRD: CPT | Mod: CPTII,S$GLB,, | Performed by: INTERNAL MEDICINE

## 2023-01-25 RX ORDER — CYCLOBENZAPRINE HCL 5 MG
5 TABLET ORAL
COMMUNITY

## 2023-01-25 NOTE — PROGRESS NOTES
PROGRESS NOTE    Subjective:       Patient ID: Paula Hernadez is a 63 y.o. female.    9/29/2021  Dx Mammo:  microcalc in left breast at 4-5 o'clock area.     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     1/19/2022  Radiation complete    2/9/2022  Arimidex started.     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  DCIS follow up    History of Present Illness:   Paula Hernadez is a 63 y.o. female who presents for follow up of above.      Ms. Hernadez is doing well today.  No new complaints.  She had biopsy this month of the left breast lesion which was negative.     She remains on Arimidex as of today, 1/25/2023      Family and Social history reviewed and is unchanged from 11/17/2021      ROS:  Review of Systems   Constitutional: Negative for fever.   Respiratory: Negative for shortness of breath.    Cardiovascular: Negative for chest pain and leg swelling.   Gastrointestinal: Negative for abdominal pain and blood in stool.   Genitourinary: Negative for hematuria.   Skin: Negative for rash.          Current Outpatient Medications:     anastrozole (ARIMIDEX) 1 mg Tab, TAKE 1 TABLET BY MOUTH EVERY DAY, Disp: 90 tablet, Rfl: 3    cyclobenzaprine (FLEXERIL) 5 MG tablet, Take 5 mg by mouth. 1-2 daily, Disp: , Rfl:     omeprazole (PRILOSEC) 20 MG capsule, Take 20 mg by mouth once daily. , Disp: , Rfl:     ibuprofen (ADVIL,MOTRIN) 200 MG tablet, Take 200 mg by mouth every 6 (six) hours as needed. , Disp: , Rfl:     LINZESS 290 mcg Cap capsule, Take 290 mcg by mouth once  daily., Disp: , Rfl:     silver sulfADIAZINE 1% (SILVADENE) 1 % cream, Apply topically 2 (two) times daily., Disp: 400 g, Rfl: 2    UNKNOWN TO PATIENT, 1 tablet., Disp: , Rfl:         Objective:       Physical Examination:     /63   Pulse 78   Temp 97.8 °F (36.6 °C)   Wt 99.3 kg (219 lb)   LMP 01/01/2012 Comment: menopause  BMI 33.30 kg/m²     Physical Exam  Constitutional:       Appearance: She is well-developed and well-nourished.   HENT:      Head: Normocephalic and atraumatic.      Right Ear: External ear normal.      Left Ear: External ear normal.      Mouth/Throat:      Mouth: Oropharynx is clear and moist.   Eyes:      Conjunctiva/sclera: Conjunctivae normal.      Pupils: Pupils are equal, round, and reactive to light.   Neck:      Thyroid: No thyromegaly.      Trachea: No tracheal deviation.   Cardiovascular:      Rate and Rhythm: Normal rate and regular rhythm.      Heart sounds: Normal heart sounds.   Pulmonary:      Effort: Pulmonary effort is normal.      Breath sounds: Normal breath sounds.   Abdominal:      General: Bowel sounds are normal. There is no distension.      Palpations: Abdomen is soft. There is no mass.      Tenderness: There is no abdominal tenderness.   Musculoskeletal:         General: No edema.   Skin:     Findings: No rash.   Neurological:      Comments: Neuro intact througout   Psychiatric:         Mood and Affect: Mood and affect normal.         Behavior: Behavior normal.         Thought Content: Thought content normal.         Judgment: Judgment normal.    BREAST:Breast exam is negative bilaterally.  No masses, no rash, no skin changes. No adenopathy is present in the cervical, supraclavicular, infraclavicular or axillary regions.  No palpable lesion in area of left breast concern           Labs:   No results found for this or any previous visit (from the past 336 hour(s)).    CMP  Sodium   Date Value Ref Range Status   12/14/2022 139 136 - 145 mmol/L Final     Potassium    Date Value Ref Range Status   12/14/2022 4.3 3.5 - 5.1 mmol/L Final     Chloride   Date Value Ref Range Status   12/14/2022 103 95 - 110 mmol/L Final     CO2   Date Value Ref Range Status   12/14/2022 28 23 - 29 mmol/L Final     Glucose   Date Value Ref Range Status   12/14/2022 87 70 - 110 mg/dL Final     BUN   Date Value Ref Range Status   12/14/2022 18 8 - 23 mg/dL Final     Creatinine   Date Value Ref Range Status   12/14/2022 0.8 0.5 - 1.4 mg/dL Final     Calcium   Date Value Ref Range Status   12/14/2022 9.4 8.7 - 10.5 mg/dL Final     Total Protein   Date Value Ref Range Status   12/14/2022 7.3 6.0 - 8.4 g/dL Final     Albumin   Date Value Ref Range Status   12/14/2022 4.1 3.5 - 5.2 g/dL Final     Total Bilirubin   Date Value Ref Range Status   12/14/2022 0.7 0.1 - 1.0 mg/dL Final     Comment:     For infants and newborns, interpretation of results should be based  on gestational age, weight and in agreement with clinical  observations.    Premature Infant recommended reference ranges:  Up to 24 hours.............<8.0 mg/dL  Up to 48 hours............<12.0 mg/dL  3-5 days..................<15.0 mg/dL  6-29 days.................<15.0 mg/dL       Alkaline Phosphatase   Date Value Ref Range Status   12/14/2022 86 55 - 135 U/L Final     AST   Date Value Ref Range Status   12/14/2022 23 10 - 40 U/L Final     ALT   Date Value Ref Range Status   12/14/2022 16 10 - 44 U/L Final     Anion Gap   Date Value Ref Range Status   12/14/2022 8 8 - 16 mmol/L Final     eGFR if    Date Value Ref Range Status   06/15/2022 >60.0 >60 mL/min/1.73 m^2 Final     eGFR if non    Date Value Ref Range Status   06/15/2022 >60.0 >60 mL/min/1.73 m^2 Final     Comment:     Calculation used to obtain the estimated glomerular filtration  rate (eGFR) is the CKD-EPI equation.        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  She will get repeat mammogram again in 2 months and I will see her in 3 to continue to follow closely with Dr. Perales.  Exam negative today. Continue anastrozole.                Discussion:     Follow up in about 6 months (around 7/25/2023).      Electronically signed by Mike Lincoln

## 2023-01-25 NOTE — ASSESSMENT & PLAN NOTE
Patient is doing well at this time and the biopsy done on the abnormal area of the left breast is negative.  She will get repeat mammogram again in 2 months and I will see her in 3 to continue to follow closely with Dr. Perales.  Exam negative today. Continue anastrozole.

## 2023-03-08 ENCOUNTER — HOSPITAL ENCOUNTER (OUTPATIENT)
Dept: RADIOLOGY | Facility: HOSPITAL | Age: 63
Discharge: HOME | End: 2023-03-08
Attending: SURGERY
Payer: COMMERCIAL

## 2023-03-08 DIAGNOSIS — R92.0 MAMMOGRAPHIC MICROCALCIFICATION: ICD-10-CM

## 2023-03-08 PROCEDURE — 77065 DX MAMMO INCL CAD UNI: CPT | Mod: TC,PO,LT

## 2023-04-19 ENCOUNTER — OFFICE VISIT (OUTPATIENT)
Dept: HEMATOLOGY/ONCOLOGY | Facility: CLINIC | Age: 63
End: 2023-04-19
Payer: COMMERCIAL

## 2023-04-19 VITALS
BODY MASS INDEX: 30.92 KG/M2 | WEIGHT: 204 LBS | DIASTOLIC BLOOD PRESSURE: 76 MMHG | HEART RATE: 79 BPM | RESPIRATION RATE: 18 BRPM | HEIGHT: 68 IN | TEMPERATURE: 98 F | SYSTOLIC BLOOD PRESSURE: 130 MMHG

## 2023-04-19 DIAGNOSIS — D05.12 DUCTAL CARCINOMA IN SITU (DCIS) OF LEFT BREAST: ICD-10-CM

## 2023-04-19 PROCEDURE — 1160F RVW MEDS BY RX/DR IN RCRD: CPT | Mod: CPTII,S$GLB,, | Performed by: INTERNAL MEDICINE

## 2023-04-19 PROCEDURE — 3075F PR MOST RECENT SYSTOLIC BLOOD PRESS GE 130-139MM HG: ICD-10-PCS | Mod: CPTII,S$GLB,, | Performed by: INTERNAL MEDICINE

## 2023-04-19 PROCEDURE — 3078F PR MOST RECENT DIASTOLIC BLOOD PRESSURE < 80 MM HG: ICD-10-PCS | Mod: CPTII,S$GLB,, | Performed by: INTERNAL MEDICINE

## 2023-04-19 PROCEDURE — 1159F MED LIST DOCD IN RCRD: CPT | Mod: CPTII,S$GLB,, | Performed by: INTERNAL MEDICINE

## 2023-04-19 PROCEDURE — 3075F SYST BP GE 130 - 139MM HG: CPT | Mod: CPTII,S$GLB,, | Performed by: INTERNAL MEDICINE

## 2023-04-19 PROCEDURE — 1159F PR MEDICATION LIST DOCUMENTED IN MEDICAL RECORD: ICD-10-PCS | Mod: CPTII,S$GLB,, | Performed by: INTERNAL MEDICINE

## 2023-04-19 PROCEDURE — 99213 OFFICE O/P EST LOW 20 MIN: CPT | Mod: S$GLB,,, | Performed by: INTERNAL MEDICINE

## 2023-04-19 PROCEDURE — 3008F BODY MASS INDEX DOCD: CPT | Mod: CPTII,S$GLB,, | Performed by: INTERNAL MEDICINE

## 2023-04-19 PROCEDURE — 1160F PR REVIEW ALL MEDS BY PRESCRIBER/CLIN PHARMACIST DOCUMENTED: ICD-10-PCS | Mod: CPTII,S$GLB,, | Performed by: INTERNAL MEDICINE

## 2023-04-19 PROCEDURE — 3008F PR BODY MASS INDEX (BMI) DOCUMENTED: ICD-10-PCS | Mod: CPTII,S$GLB,, | Performed by: INTERNAL MEDICINE

## 2023-04-19 PROCEDURE — 3078F DIAST BP <80 MM HG: CPT | Mod: CPTII,S$GLB,, | Performed by: INTERNAL MEDICINE

## 2023-04-19 PROCEDURE — 99213 PR OFFICE/OUTPT VISIT, EST, LEVL III, 20-29 MIN: ICD-10-PCS | Mod: S$GLB,,, | Performed by: INTERNAL MEDICINE

## 2023-04-19 NOTE — ASSESSMENT & PLAN NOTE
Patient is doing well and the biopsy she had earlier this year was c/w fat necrosis.  Will check another mammogram again in July this year and will have her back with me in six months as planned.

## 2023-04-19 NOTE — PROGRESS NOTES
PROGRESS NOTE    Subjective:       Patient ID: Paula Hernadez is a 63 y.o. female.    9/29/2021  Dx Mammo:  microcalc in left breast at 4-5 o'clock area.     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     1/19/2022  Radiation complete    2/9/2022  Arimidex started.     1/19/2022:  Dexa: Normal    6/20/2022:  Bilateral mammogram negative  Recheck left breast in 6 months(surgical changes)    1/11/2023:  Left breast microcal biopsy:  BREAST, LEFT, MICROCALCIFICATIONS, STEREOTACTIC GUIDED CORE BIOPSY   - FAT NECROSIS AND FIBROSIS WITH ASSOCIATED MICROCALCIFICATIONS     Chief Complaint:  No chief complaint on file.  DCIS follow up    History of Present Illness:   Paula Hernadez is a 63 y.o. female who presents for follow up of above.      Ms. Hernadez is doing well today.  No new complaints.  She had biopsy this month of the left breast lesion which was negative.     She remains on Arimidex as of today, 4/19/2023      Family and Social history reviewed and is unchanged from 11/17/2021      ROS:  Review of Systems   Constitutional: Negative for fever.   Respiratory: Negative for shortness of breath.    Cardiovascular: Negative for chest pain and leg swelling.   Gastrointestinal: Negative for abdominal pain and blood in stool.   Genitourinary: Negative for hematuria.   Skin: Negative for rash.          Current Outpatient Medications:     anastrozole (ARIMIDEX) 1 mg Tab, TAKE 1 TABLET BY MOUTH EVERY DAY, Disp: 90 tablet, Rfl: 3    cyclobenzaprine (FLEXERIL) 5 MG tablet, Take 5 mg by mouth. 1-2 daily, Disp: , Rfl:     LINZESS 290 mcg Cap capsule, Take 290 mcg by mouth once daily., Disp: , Rfl:     omeprazole (PRILOSEC) 20 MG capsule, Take 20 mg by mouth once daily. , Disp: , Rfl:     ibuprofen (ADVIL,MOTRIN) 200 MG tablet, Take 200 mg by mouth every 6 (six) hours as  "needed. , Disp: , Rfl:     silver sulfADIAZINE 1% (SILVADENE) 1 % cream, Apply topically 2 (two) times daily., Disp: 400 g, Rfl: 2    UNKNOWN TO PATIENT, 1 tablet., Disp: , Rfl:         Objective:       Physical Examination:     /76   Pulse 79   Temp 97.9 °F (36.6 °C)   Resp 18   Ht 5' 8" (1.727 m)   Wt 92.5 kg (204 lb)   LMP 01/01/2012 Comment: menopause  BMI 31.02 kg/m²     Physical Exam  Constitutional:       Appearance: She is well-developed and well-nourished.   HENT:      Head: Normocephalic and atraumatic.      Right Ear: External ear normal.      Left Ear: External ear normal.      Mouth/Throat:      Mouth: Oropharynx is clear and moist.   Eyes:      Conjunctiva/sclera: Conjunctivae normal.      Pupils: Pupils are equal, round, and reactive to light.   Neck:      Thyroid: No thyromegaly.      Trachea: No tracheal deviation.   Cardiovascular:      Rate and Rhythm: Normal rate and regular rhythm.      Heart sounds: Normal heart sounds.   Pulmonary:      Effort: Pulmonary effort is normal.      Breath sounds: Normal breath sounds.   Abdominal:      General: Bowel sounds are normal. There is no distension.      Palpations: Abdomen is soft. There is no mass.      Tenderness: There is no abdominal tenderness.   Musculoskeletal:         General: No edema.   Skin:     Findings: No rash.   Neurological:      Comments: Neuro intact througout   Psychiatric:         Mood and Affect: Mood and affect normal.         Behavior: Behavior normal.         Thought Content: Thought content normal.         Judgment: Judgment normal.    BREAST:Breast exam is negative bilaterally.  No masses, no rash, no skin changes. No adenopathy is present in the cervical, supraclavicular, infraclavicular or axillary regions.  No palpable lesion in area of left breast concern           Labs:   No results found for this or any previous visit (from the past 336 hour(s)).    CMP  Sodium   Date Value Ref Range Status   12/14/2022 139 " 136 - 145 mmol/L Final     Potassium   Date Value Ref Range Status   12/14/2022 4.3 3.5 - 5.1 mmol/L Final     Chloride   Date Value Ref Range Status   12/14/2022 103 95 - 110 mmol/L Final     CO2   Date Value Ref Range Status   12/14/2022 28 23 - 29 mmol/L Final     Glucose   Date Value Ref Range Status   12/14/2022 87 70 - 110 mg/dL Final     BUN   Date Value Ref Range Status   12/14/2022 18 8 - 23 mg/dL Final     Creatinine   Date Value Ref Range Status   12/14/2022 0.8 0.5 - 1.4 mg/dL Final     Calcium   Date Value Ref Range Status   12/14/2022 9.4 8.7 - 10.5 mg/dL Final     Total Protein   Date Value Ref Range Status   12/14/2022 7.3 6.0 - 8.4 g/dL Final     Albumin   Date Value Ref Range Status   12/14/2022 4.1 3.5 - 5.2 g/dL Final     Total Bilirubin   Date Value Ref Range Status   12/14/2022 0.7 0.1 - 1.0 mg/dL Final     Comment:     For infants and newborns, interpretation of results should be based  on gestational age, weight and in agreement with clinical  observations.    Premature Infant recommended reference ranges:  Up to 24 hours.............<8.0 mg/dL  Up to 48 hours............<12.0 mg/dL  3-5 days..................<15.0 mg/dL  6-29 days.................<15.0 mg/dL       Alkaline Phosphatase   Date Value Ref Range Status   12/14/2022 86 55 - 135 U/L Final     AST   Date Value Ref Range Status   12/14/2022 23 10 - 40 U/L Final     ALT   Date Value Ref Range Status   12/14/2022 16 10 - 44 U/L Final     Anion Gap   Date Value Ref Range Status   12/14/2022 8 8 - 16 mmol/L Final     eGFR if    Date Value Ref Range Status   06/15/2022 >60.0 >60 mL/min/1.73 m^2 Final     eGFR if non    Date Value Ref Range Status   06/15/2022 >60.0 >60 mL/min/1.73 m^2 Final     Comment:     Calculation used to obtain the estimated glomerular filtration  rate (eGFR) is the CKD-EPI equation.        No results found for: CEA  No results found for: PSA        Assessment/Plan:     Problem List  Items Addressed This Visit       DCIS-Left Breast-ER/AR pos     Patient is doing well and the biopsy she had earlier this year was c/w fat necrosis.  Will check another mammogram again in July this year and will have her back with me in six months as planned.             Relevant Orders    Mammo Digital Diagnostic Bilat with [facility]           Discussion:     Follow up in about 6 months (around 10/19/2023).      Electronically signed by Mike Lincoln

## 2023-04-20 DIAGNOSIS — D05.12 INTRADUCTAL CARCINOMA IN SITU OF LEFT BREAST: Primary | ICD-10-CM

## 2023-05-31 ENCOUNTER — HOSPITAL ENCOUNTER (OUTPATIENT)
Dept: RADIOLOGY | Facility: HOSPITAL | Age: 63
Discharge: HOME | End: 2023-05-31
Attending: INTERNAL MEDICINE
Payer: COMMERCIAL

## 2023-05-31 DIAGNOSIS — D05.12 DUCTAL CARCINOMA IN SITU (DCIS) OF LEFT BREAST: ICD-10-CM

## 2023-05-31 DIAGNOSIS — D05.12 INTRADUCTAL CARCINOMA IN SITU OF LEFT BREAST: ICD-10-CM

## 2023-05-31 PROCEDURE — 77062 BREAST TOMOSYNTHESIS BI: CPT | Mod: TC,PO

## 2023-08-01 ENCOUNTER — TELEPHONE (OUTPATIENT)
Dept: DERMATOLOGY | Facility: CLINIC | Age: 63
End: 2023-08-01
Payer: COMMERCIAL

## 2023-08-01 NOTE — TELEPHONE ENCOUNTER
----- Message from Flash Saldana sent at 8/1/2023 11:44 AM CDT -----  Type:  Sooner Appointment Request    Caller is requesting a sooner appointment.  Caller declined first available appointment listed below.  Caller will not accept being placed on the waitlist and is requesting a message be sent to doctor.    Name of Caller:  pt  When is the first available appointment?  N/A  Symptoms:  something on her face  Best Call Back Number:  [phone]  Additional Information:  pt was put on the waitlist due to no appts popping up, pl call bk to advise thanks

## 2023-08-15 ENCOUNTER — OFFICE VISIT (OUTPATIENT)
Dept: DERMATOLOGY | Facility: CLINIC | Age: 63
End: 2023-08-15
Payer: COMMERCIAL

## 2023-08-15 DIAGNOSIS — D22.9 BENIGN NEVUS: ICD-10-CM

## 2023-08-15 DIAGNOSIS — L73.8 SEBACEOUS HYPERPLASIA: ICD-10-CM

## 2023-08-15 DIAGNOSIS — L57.8 ACTINIC SKIN DAMAGE: ICD-10-CM

## 2023-08-15 DIAGNOSIS — D18.01 CHERRY ANGIOMA: ICD-10-CM

## 2023-08-15 DIAGNOSIS — L82.1 SEBORRHEIC KERATOSES: Primary | ICD-10-CM

## 2023-08-15 DIAGNOSIS — L57.0 ACTINIC KERATOSIS: ICD-10-CM

## 2023-08-15 PROCEDURE — 1160F PR REVIEW ALL MEDS BY PRESCRIBER/CLIN PHARMACIST DOCUMENTED: ICD-10-PCS | Mod: CPTII,S$GLB,, | Performed by: STUDENT IN AN ORGANIZED HEALTH CARE EDUCATION/TRAINING PROGRAM

## 2023-08-15 PROCEDURE — 99213 OFFICE O/P EST LOW 20 MIN: CPT | Mod: 25,S$GLB,, | Performed by: STUDENT IN AN ORGANIZED HEALTH CARE EDUCATION/TRAINING PROGRAM

## 2023-08-15 PROCEDURE — 1159F MED LIST DOCD IN RCRD: CPT | Mod: CPTII,S$GLB,, | Performed by: STUDENT IN AN ORGANIZED HEALTH CARE EDUCATION/TRAINING PROGRAM

## 2023-08-15 PROCEDURE — 1159F PR MEDICATION LIST DOCUMENTED IN MEDICAL RECORD: ICD-10-PCS | Mod: CPTII,S$GLB,, | Performed by: STUDENT IN AN ORGANIZED HEALTH CARE EDUCATION/TRAINING PROGRAM

## 2023-08-15 PROCEDURE — 17000 DESTRUCT PREMALG LESION: CPT | Mod: S$GLB,,, | Performed by: STUDENT IN AN ORGANIZED HEALTH CARE EDUCATION/TRAINING PROGRAM

## 2023-08-15 PROCEDURE — 17000 PR DESTRUCTION(LASER SURGERY,CRYOSURGERY,CHEMOSURGERY),PREMALIGNANT LESIONS,FIRST LESION: ICD-10-PCS | Mod: S$GLB,,, | Performed by: STUDENT IN AN ORGANIZED HEALTH CARE EDUCATION/TRAINING PROGRAM

## 2023-08-15 PROCEDURE — 99213 PR OFFICE/OUTPT VISIT, EST, LEVL III, 20-29 MIN: ICD-10-PCS | Mod: 25,S$GLB,, | Performed by: STUDENT IN AN ORGANIZED HEALTH CARE EDUCATION/TRAINING PROGRAM

## 2023-08-15 PROCEDURE — 1160F RVW MEDS BY RX/DR IN RCRD: CPT | Mod: CPTII,S$GLB,, | Performed by: STUDENT IN AN ORGANIZED HEALTH CARE EDUCATION/TRAINING PROGRAM

## 2023-08-15 NOTE — PATIENT INSTRUCTIONS

## 2023-08-15 NOTE — PROGRESS NOTES
Subjective:      Patient ID:  Paula Hernadez is a 63 y.o. female who presents for   Chief Complaint   Patient presents with    Spot     Spot on left temple     LOV 03/10/22    Patient here today for TBSE; states she has spot on left temple for x yrs. States feel raised and bumpy; not itchy or burning. No Tx    Denies Phx NMSC           Final Pathologic Diagnosis 1.  Skin, central chest, shave biopsy:   - BENIGN LICHENOID KERATOSIS.             Review of Systems   Constitutional:  Negative for fever and chills.   Respiratory:  Negative for cough and shortness of breath.    Gastrointestinal:  Positive for vomiting. Negative for nausea.   Skin:  Positive for daily sunscreen use and activity-related sunscreen use.   Hematologic/Lymphatic: Bruises/bleeds easily.       Objective:   Physical Exam   Constitutional: She appears well-developed and well-nourished. No distress.   Neurological: She is alert and oriented to person, place, and time. She is not disoriented.   Psychiatric: She has a normal mood and affect.   Skin:   Areas Examined (abnormalities noted in diagram):   Scalp / Hair Palpated and Inspected  Head / Face Inspection Performed  Neck Inspection Performed  Chest / Axilla Inspection Performed  Abdomen Inspection Performed  Genitals / Buttocks / Groin Inspection Performed  Back Inspection Performed  RUE Inspected  LUE Inspection Performed  RLE Inspected  LLE Inspection Performed  Nails and Digits Inspection Performed                 Diagram Legend     Erythematous scaling macule/papule c/w actinic keratosis       Vascular papule c/w angioma      Pigmented verrucoid papule/plaque c/w seborrheic keratosis      Yellow umbilicated papule c/w sebaceous hyperplasia      Irregularly shaped tan macule c/w lentigo     1-2 mm smooth white papules consistent with Milia      Movable subcutaneous cyst with punctum c/w epidermal inclusion cyst      Subcutaneous movable cyst c/w pilar cyst      Firm pink to brown papule  c/w dermatofibroma      Pedunculated fleshy papule(s) c/w skin tag(s)      Evenly pigmented macule c/w junctional nevus     Mildly variegated pigmented, slightly irregular-bordered macule c/w mildly atypical nevus      Flesh colored to evenly pigmented papule c/w intradermal nevus       Pink pearly papule/plaque c/w basal cell carcinoma      Erythematous hyperkeratotic cursted plaque c/w SCC      Surgical scar with no sign of skin cancer recurrence      Open and closed comedones      Inflammatory papules and pustules      Verrucoid papule consistent consistent with wart     Erythematous eczematous patches and plaques     Dystrophic onycholytic nail with subungual debris c/w onychomycosis     Umbilicated papule    Erythematous-base heme-crusted tan verrucoid plaque consistent with inflamed seborrheic keratosis     Erythematous Silvery Scaling Plaque c/w Psoriasis     See annotation        Assessment / Plan:        Seborrheic keratoses  These are benign inherited growths without a malignant potential. Reassurance given to patient. No treatment is necessary.     Cherry angioma  This is a benign vascular lesion. Reassurance given. No treatment required.     Actinic keratosis  Cryosurgery Procedure Note    Verbal consent from the patient is obtained and the patient is aware of the precancerous quality and need for treatment of these lesions. Liquid nitrogen cryosurgery is applied to the 1 actinic keratoses, as detailed in the physical exam, to produce a freeze injury. The patient is aware that blisters may form and is instructed on wound care with gentle cleansing and use of vaseline ointment to keep moist until healed. The patient is supplied a handout on cryosurgery and is instructed to call if lesions do not completely resolve.    Benign nevus  Careful dermoscopy evaluation of nevi performed with none identified as needing biopsy today  Monitor for new mole or moles that are becoming bigger, darker, irritated, or  developing irregular borders.     Actinic skin damage  Total body skin examination performed today including at least 12 points as noted in physical examination. No lesions suspicious for malignancy noted.  Patient instructed in importance in daily broad spectrum sun protection of at least spf 30. Mineral sunscreen ingredients preferred (Zinc +/- Titanium) and can be found OTC.   Patient encouraged to wear hat for all outdoor exposure.   Also discussed sun avoidance and use of protective clothing.    Sebaceous hyperplasia  This is a common condition representing benign enlargement of the sebaceous lobule. It typically occurs in adulthood. Reassurance given to patient.          1 year    No follow-ups on file.

## 2023-08-18 ENCOUNTER — PATIENT MESSAGE (OUTPATIENT)
Dept: RADIATION ONCOLOGY | Facility: CLINIC | Age: 63
End: 2023-08-18

## 2023-09-12 ENCOUNTER — PATIENT MESSAGE (OUTPATIENT)
Dept: HEMATOLOGY/ONCOLOGY | Facility: CLINIC | Age: 63
End: 2023-09-12

## 2023-09-12 DIAGNOSIS — R23.2 HOT FLASHES RELATED TO AROMATASE INHIBITOR THERAPY: ICD-10-CM

## 2023-09-12 DIAGNOSIS — T45.1X5A HOT FLASHES RELATED TO AROMATASE INHIBITOR THERAPY: ICD-10-CM

## 2023-09-12 DIAGNOSIS — R92.8 ABNORMAL MAMMOGRAM OF LEFT BREAST: Primary | ICD-10-CM

## 2023-09-13 RX ORDER — VENLAFAXINE HYDROCHLORIDE 37.5 MG/1
37.5 CAPSULE, EXTENDED RELEASE ORAL DAILY
Qty: 30 CAPSULE | Refills: 11 | Status: SHIPPED | OUTPATIENT
Start: 2023-09-13 | End: 2024-09-12

## 2023-10-11 ENCOUNTER — OFFICE VISIT (OUTPATIENT)
Dept: FAMILY MEDICINE | Facility: CLINIC | Age: 63
End: 2023-10-11
Payer: COMMERCIAL

## 2023-10-11 VITALS
WEIGHT: 202 LBS | TEMPERATURE: 98 F | HEIGHT: 68 IN | SYSTOLIC BLOOD PRESSURE: 136 MMHG | OXYGEN SATURATION: 99 % | BODY MASS INDEX: 30.62 KG/M2 | HEART RATE: 71 BPM | DIASTOLIC BLOOD PRESSURE: 82 MMHG

## 2023-10-11 DIAGNOSIS — Z11.4 ENCOUNTER FOR SCREENING FOR HIV: ICD-10-CM

## 2023-10-11 DIAGNOSIS — Z00.00 ENCOUNTER FOR PREVENTATIVE ADULT HEALTH CARE EXAMINATION: ICD-10-CM

## 2023-10-11 DIAGNOSIS — Z11.59 NEED FOR HEPATITIS C SCREENING TEST: ICD-10-CM

## 2023-10-11 DIAGNOSIS — Z85.3 HISTORY OF BREAST CANCER IN FEMALE: ICD-10-CM

## 2023-10-11 DIAGNOSIS — G47.00 INSOMNIA, UNSPECIFIED TYPE: Primary | ICD-10-CM

## 2023-10-11 PROBLEM — E66.811 OBESITY (BMI 30.0-34.9): Status: ACTIVE | Noted: 2017-05-02

## 2023-10-11 PROBLEM — E66.9 OBESITY (BMI 30.0-34.9): Status: ACTIVE | Noted: 2017-05-02

## 2023-10-11 PROCEDURE — 1160F PR REVIEW ALL MEDS BY PRESCRIBER/CLIN PHARMACIST DOCUMENTED: ICD-10-PCS | Mod: CPTII,S$GLB,, | Performed by: INTERNAL MEDICINE

## 2023-10-11 PROCEDURE — 99202 OFFICE O/P NEW SF 15 MIN: CPT | Mod: S$GLB,,, | Performed by: INTERNAL MEDICINE

## 2023-10-11 PROCEDURE — 3075F SYST BP GE 130 - 139MM HG: CPT | Mod: CPTII,S$GLB,, | Performed by: INTERNAL MEDICINE

## 2023-10-11 PROCEDURE — 3008F BODY MASS INDEX DOCD: CPT | Mod: CPTII,S$GLB,, | Performed by: INTERNAL MEDICINE

## 2023-10-11 PROCEDURE — 1159F PR MEDICATION LIST DOCUMENTED IN MEDICAL RECORD: ICD-10-PCS | Mod: CPTII,S$GLB,, | Performed by: INTERNAL MEDICINE

## 2023-10-11 PROCEDURE — 3079F PR MOST RECENT DIASTOLIC BLOOD PRESSURE 80-89 MM HG: ICD-10-PCS | Mod: CPTII,S$GLB,, | Performed by: INTERNAL MEDICINE

## 2023-10-11 PROCEDURE — 1160F RVW MEDS BY RX/DR IN RCRD: CPT | Mod: CPTII,S$GLB,, | Performed by: INTERNAL MEDICINE

## 2023-10-11 PROCEDURE — 3008F PR BODY MASS INDEX (BMI) DOCUMENTED: ICD-10-PCS | Mod: CPTII,S$GLB,, | Performed by: INTERNAL MEDICINE

## 2023-10-11 PROCEDURE — 3079F DIAST BP 80-89 MM HG: CPT | Mod: CPTII,S$GLB,, | Performed by: INTERNAL MEDICINE

## 2023-10-11 PROCEDURE — 99202 PR OFFICE/OUTPT VISIT, NEW, LEVL II, 15-29 MIN: ICD-10-PCS | Mod: S$GLB,,, | Performed by: INTERNAL MEDICINE

## 2023-10-11 PROCEDURE — 3075F PR MOST RECENT SYSTOLIC BLOOD PRESS GE 130-139MM HG: ICD-10-PCS | Mod: CPTII,S$GLB,, | Performed by: INTERNAL MEDICINE

## 2023-10-11 PROCEDURE — 1159F MED LIST DOCD IN RCRD: CPT | Mod: CPTII,S$GLB,, | Performed by: INTERNAL MEDICINE

## 2023-10-11 RX ORDER — TRAZODONE HYDROCHLORIDE 50 MG/1
50 TABLET ORAL NIGHTLY PRN
Qty: 30 TABLET | Refills: 6 | Status: SHIPPED | OUTPATIENT
Start: 2023-10-11 | End: 2024-02-01

## 2023-10-11 NOTE — PROGRESS NOTES
Subjective:       Patient ID: Paula Hernadez is a 63 y.o. female.    Chief Complaint: Establish Care (New patient establishment)    Here to establish care; hasn't really had a PCP in years.  She is followed by Heme/Onc for h/o breast cancer.   S/p lumpectomy and radiation and currently on arimidex.  She is getting mammograms every 6 months.  She sees GYN for pelvic/breast exam and reports last visit earlier this year.  UTD on colonoscopy.  Not interested in immunizations.   She had a cough that persisted for several weeks recently but it has resolved.    Only complaint today is difficultly sleeping; she falls asleep fine but wakes up between 4-6 hours later and can't get back to sleep.        Review of Systems   Constitutional:  Negative for activity change, appetite change, chills, diaphoresis, fatigue, fever and unexpected weight change.   HENT:  Positive for postnasal drip. Negative for congestion, ear discharge, ear pain, hearing loss, mouth sores, nosebleeds, rhinorrhea, sinus pressure, sinus pain, sneezing, sore throat, tinnitus, trouble swallowing and voice change.    Eyes:  Positive for visual disturbance (floaters). Negative for photophobia, pain, discharge, redness and itching.   Respiratory:  Positive for cough. Negative for apnea, choking, chest tightness, shortness of breath and wheezing.    Cardiovascular:  Negative for chest pain, palpitations and leg swelling.   Gastrointestinal:  Positive for constipation. Negative for abdominal distention, abdominal pain, blood in stool, diarrhea, nausea and vomiting.   Endocrine: Negative for cold intolerance, heat intolerance, polydipsia and polyuria.   Genitourinary:  Negative for decreased urine volume, difficulty urinating, dyspareunia, dysuria, enuresis, frequency, genital sores, hematuria, menstrual problem, pelvic pain, urgency, vaginal bleeding, vaginal discharge and vaginal pain.   Musculoskeletal:  Positive for arthralgias and neck pain. Negative for  back pain (neck and shoulders), gait problem, joint swelling, myalgias and neck stiffness.   Skin:  Negative for rash and wound.   Allergic/Immunologic: Negative for environmental allergies, food allergies and immunocompromised state.   Neurological:  Negative for dizziness, tremors, seizures, syncope, facial asymmetry, speech difficulty, weakness, light-headedness, numbness and headaches.   Hematological:  Negative for adenopathy. Does not bruise/bleed easily.   Psychiatric/Behavioral:  Negative for confusion, decreased concentration, hallucinations, self-injury, sleep disturbance and suicidal ideas. The patient is not nervous/anxious.        Past Medical History:   Diagnosis Date    Breast cancer     Chronic insomnia     Floaters in visual field     Neuromuscular disorder     Seasonal allergies     Seborrheic keratoses     Thumb pain 2018      Past Surgical History:   Procedure Laterality Date    BREAST BIOPSY      BREAST MASS EXCISION      COSMETIC SURGERY      tummy tuck    CYST REMOVAL  05/27/2021    chest    INTERPOSITION ARTHROPLASTY OF CARPOMETACARPAL JOINTS Left 06/09/2021    Procedure: INTERPOSITION ARTHROPLASTY, CMC JOINT;  Surgeon: Giovani Molina MD;  Location: [facility] OR;  Service: Orthopedics;  Laterality: Left;    MASTECTOMY, PARTIAL Left 10/19/2021    Procedure: MASTECTOMY, PARTIAL;  Surgeon: Lila Perales MD;  Location: [facility] OR;  Service: General;  Laterality: Left;    NEEDLE LOCALIZATION Left 10/19/2021    Procedure: NEEDLE LOCALIZATION;  Surgeon: Lila Perales MD;  Location: [facility] OR;  Service: General;  Laterality: Left;  LOCALIZER DONE 10/15    SHOULDER SURGERY Left     TONSILLECTOMY         Family History   Problem Relation Age of Onset    Stroke Mother     Prostate cancer Father     Melanoma Neg Hx     Psoriasis Neg Hx     Lupus Neg Hx     Eczema Neg Hx        Social History     Socioeconomic History    Marital status:     Number of children: 1   Occupational History    Occupation: clerical  "  Tobacco Use    Smoking status: Former     Current packs/day: 0.00     Average packs/day: 0.3 packs/day for 27.8 years (6.9 ttl pk-yrs)     Types: Cigarettes     Start date:      Quit date: 10/6/2002     Years since quittin.0    Smokeless tobacco: Never   Substance and Sexual Activity    Alcohol use: Yes     Alcohol/week: 4.0 standard drinks of alcohol     Types: 4 Standard drinks or equivalent per week     Comment: daiquiris, weekly    Drug use: Yes     Frequency: 2.0 times per week     Types: Marijuana     Comment: thc gummies    Sexual activity: Yes     Partners: Female     Birth control/protection: Post-menopausal   Social History Narrative    Live with        Current Outpatient Medications   Medication Sig Dispense Refill    anastrozole (ARIMIDEX) 1 mg Tab TAKE 1 TABLET BY MOUTH EVERY DAY 90 tablet 3    cyclobenzaprine (FLEXERIL) 5 MG tablet Take 5 mg by mouth. 1-2 daily      LINZESS 290 mcg Cap capsule Take 290 mcg by mouth once daily.      omeprazole (PRILOSEC) 20 MG capsule Take 20 mg by mouth once daily.       venlafaxine (EFFEXOR-XR) 37.5 MG 24 hr capsule Take 1 capsule (37.5 mg total) by mouth once daily. 30 capsule 11    traZODone (DESYREL) 50 MG tablet Take 1 tablet (50 mg total) by mouth nightly as needed for Insomnia. 30 tablet 6     No current facility-administered medications for this visit.       Review of patient's allergies indicates:   Allergen Reactions    Codeine Nausea Only     Objective:    HPI     Establish Care     Additional comments: New patient establishment          Last edited by Geo Kumar MA on 10/11/2023  2:44 PM.      Blood pressure 136/82, pulse 71, temperature 97.9 °F (36.6 °C), temperature source Temporal, height 5' 8" (1.727 m), weight 91.6 kg (202 lb), last menstrual period 2012, SpO2 99 %. Body mass index is 30.71 kg/m².   Physical Exam  Vitals and nursing note reviewed.   Constitutional:       General: She is not in acute distress.     " Appearance: She is well-developed. She is not ill-appearing, toxic-appearing or diaphoretic.   HENT:      Head: Normocephalic and atraumatic.   Eyes:      General: No scleral icterus.        Right eye: No discharge.         Left eye: No discharge.      Conjunctiva/sclera: Conjunctivae normal.   Neck:      Vascular: No carotid bruit.   Cardiovascular:      Rate and Rhythm: Normal rate and regular rhythm.      Heart sounds: Normal heart sounds. No murmur heard.  Pulmonary:      Effort: Pulmonary effort is normal. No respiratory distress.      Breath sounds: Normal breath sounds. No decreased breath sounds, wheezing, rhonchi or rales.   Abdominal:      General: There is no distension.      Palpations: Abdomen is soft.      Tenderness: There is no abdominal tenderness. There is no guarding or rebound.   Musculoskeletal:      Right lower leg: No edema.      Left lower leg: No edema.   Skin:     General: Skin is warm and dry.   Neurological:      Mental Status: She is alert.      Motor: No tremor.   Psychiatric:         Mood and Affect: Mood normal.         Speech: Speech normal.         Behavior: Behavior normal.             Assessment:       1. Insomnia, unspecified type    2. Need for hepatitis C screening test    3. Encounter for screening for HIV    4. Encounter for preventative adult health care examination    5. History of breast cancer in female        Plan:       Paula was seen today for [facility] care.    Diagnoses and all orders for this visit:    Insomnia, unspecified type  -     traZODone (DESYREL) 50 MG tablet; Take 1 tablet (50 mg total) by mouth nightly as needed for Insomnia.    Need for hepatitis C screening test  -     Hepatitis C Antibody; Future  -     Hepatitis C Antibody    Encounter for screening for HIV  -     HIV 1/2 Ag/Ab (4th Gen); Future  -     HIV 1/2 Ag/Ab (4th Gen)    Encounter for preventative adult health care examination  -     CBC Auto Differential; Future  -     Comprehensive  Metabolic Panel; Future  -     Lipid Panel; Future  -     Hepatitis C Antibody; Future  -     HIV 1/2 Ag/Ab (4th Gen); Future  -     CBC Auto Differential  -     Comprehensive Metabolic Panel  -     Lipid Panel  -     Hepatitis C Antibody  -     HIV 1/2 Ag/Ab (4th Gen)    History of breast cancer in female  -     CBC Auto Differential; Future  -     CBC Auto Differential

## 2023-10-13 ENCOUNTER — PATIENT OUTREACH (OUTPATIENT)
Dept: ADMINISTRATIVE | Facility: HOSPITAL | Age: 63
End: 2023-10-13
Payer: COMMERCIAL

## 2023-10-13 LAB — PAP RECOMMENDATION EXT: NORMAL

## 2023-10-13 NOTE — PROGRESS NOTES
Population Health Chart Review & Patient Outreach Details:     Reason for Outreach Encounter:     []  Non-Compliant Report   []  Payor Report ([employer], PHN, BCBS, MSSP, MCIP, UHC, etc.)   [x]  \ Chart Review     Updates Requested / Reviewed:     [x]  Care Everywhere    []     [x]  External Sources ([facility], [facility], DIS, etc.)   [x]  Care Team Updated    Patient Outreach Method:    []  Telephone Outreach Completed   [] Successful   [] Left Voicemail   [] Unable to Contact (wrong number, no voicemail)  []  [employer] [facility] Outreach Sent  []  Letter Outreach Mailed  []  Fax Sent for External Records  [x]  External Records Upload    Health Maintenance Topics Addressed and Outreach Outcomes / Actions Taken:        []      Breast Cancer Screening []  Mammo Scheduled      []  External Records Requested     []  Added Reminder to Complete to Upcoming Primary Care Appt Notes     []  Patient Declined     []  Patient Will Call Back to Schedule     []  Patient Will Schedule with External Provider / Order Routed if Applicable             [x]       Cervical Cancer Screening []  Pap Scheduled      [x]  External Records Uploaded     []  Added Reminder to Complete to Upcoming Primary Care Appt Notes     []  Patient Declined     []  Patient Will Call Back to Schedule     []  Patient Will Schedule with External Provider               []          Colorectal Cancer Screening []  Colonoscopy Case Request or Referral Placed     []  External Records Requested     []  Added Reminder to Complete to Upcoming Primary Care Appt Notes     []  Patient Declined     []  Patient Will Call Back to Schedule     []  Patient Will Schedule with External Provider     []  Fit Kit Mailed (add the SmartPhrase under additional notes)     []  Reminded Patient to Complete Home Test             []      Diabetic Eye Exam []  Eye Camera Scheduled or Optometry Referral Placed     []  External Records Requested     []  Added Reminder to Complete to  Upcoming Primary Care Appt Notes     []  Patient Declined     []  Patient Will Call Back to Schedule     []  Patient Will Schedule with External Provider             []      Blood Pressure Control []  Primary Care Follow Up Visit Scheduled     []  Remote Blood Pressure Reading Captured     []  Added Reminder to Complete to Upcoming Primary Care Appt Notes     []  Patient Declined     []  Patient Will Call Back / Patient Will Send Portal Message with Reading     []  Patient Will Call Back to Schedule Provider Visit             []       HbA1c & Other Labs []  Lab Appt Scheduled for Due Labs     []  Primary Care Follow Up Visit Scheduled      []  Reminded Patient to Complete Home Test     []  Added Reminder to Complete to Upcoming Primary Care Appt Notes     []  Patient Declined     []  Patient Will Call Back to Schedule     []  Patient Will Schedule with External Provider / Order Routed if Applicable           []    Schedule Primary Care Appt []  Primary Care Appt Scheduled     []  Patient Declined     []  Patient Will Call Back to Schedule     []  Pt Established with External Provider & Updated Care Team             []      Medication Adherence []  Primary Care Appointment Scheduled     []  Added Reminder to Upcoming Primary Care Appt Notes     []  Patient Reminded to  Prescription     []  Patient Declined, Provider Notified if Needed     []  Sent Provider Message to Review and/or Add Exclusion to Problem List             []      Osteoporosis Screening []  DXA Appointment Scheduled     []  External Records Requested     []  Added Reminder to Complete to Upcoming Primary Care Appt Notes     []  Patient Declined     []  Patient Will Call Back to Schedule     []  Patient Will Schedule with External Provider / Order Routed if Applicable     Additional Care Coordinator Notes:         Further Action Needed If Patient Returns Outreach:

## 2023-10-18 ENCOUNTER — OFFICE VISIT (OUTPATIENT)
Dept: HEMATOLOGY/ONCOLOGY | Facility: CLINIC | Age: 63
End: 2023-10-18
Payer: COMMERCIAL

## 2023-10-18 VITALS
OXYGEN SATURATION: 97 % | DIASTOLIC BLOOD PRESSURE: 84 MMHG | BODY MASS INDEX: 30.87 KG/M2 | SYSTOLIC BLOOD PRESSURE: 140 MMHG | WEIGHT: 203 LBS | TEMPERATURE: 97 F | HEART RATE: 85 BPM

## 2023-10-18 DIAGNOSIS — D05.12 DUCTAL CARCINOMA IN SITU (DCIS) OF LEFT BREAST: ICD-10-CM

## 2023-10-18 PROCEDURE — 99213 PR OFFICE/OUTPT VISIT, EST, LEVL III, 20-29 MIN: ICD-10-PCS | Mod: S$GLB,,, | Performed by: INTERNAL MEDICINE

## 2023-10-18 PROCEDURE — 3079F DIAST BP 80-89 MM HG: CPT | Mod: CPTII,S$GLB,, | Performed by: INTERNAL MEDICINE

## 2023-10-18 PROCEDURE — 1159F PR MEDICATION LIST DOCUMENTED IN MEDICAL RECORD: ICD-10-PCS | Mod: CPTII,S$GLB,, | Performed by: INTERNAL MEDICINE

## 2023-10-18 PROCEDURE — 1159F MED LIST DOCD IN RCRD: CPT | Mod: CPTII,S$GLB,, | Performed by: INTERNAL MEDICINE

## 2023-10-18 PROCEDURE — 3008F PR BODY MASS INDEX (BMI) DOCUMENTED: ICD-10-PCS | Mod: CPTII,S$GLB,, | Performed by: INTERNAL MEDICINE

## 2023-10-18 PROCEDURE — 99213 OFFICE O/P EST LOW 20 MIN: CPT | Mod: S$GLB,,, | Performed by: INTERNAL MEDICINE

## 2023-10-18 PROCEDURE — 3008F BODY MASS INDEX DOCD: CPT | Mod: CPTII,S$GLB,, | Performed by: INTERNAL MEDICINE

## 2023-10-18 PROCEDURE — 3077F SYST BP >= 140 MM HG: CPT | Mod: CPTII,S$GLB,, | Performed by: INTERNAL MEDICINE

## 2023-10-18 PROCEDURE — 3079F PR MOST RECENT DIASTOLIC BLOOD PRESSURE 80-89 MM HG: ICD-10-PCS | Mod: CPTII,S$GLB,, | Performed by: INTERNAL MEDICINE

## 2023-10-18 PROCEDURE — 3077F PR MOST RECENT SYSTOLIC BLOOD PRESSURE >= 140 MM HG: ICD-10-PCS | Mod: CPTII,S$GLB,, | Performed by: INTERNAL MEDICINE

## 2023-10-18 NOTE — PROGRESS NOTES
PROGRESS NOTE    Subjective:       Patient ID: Paula Hernadez is a 63 y.o. female.    9/29/2021  Dx Mammo:  microcalc in left breast at 4-5 o'clock area.     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     1/19/2022  Radiation complete    2/9/2022  Arimidex started.     1/19/2022:  Dexa: Normal    6/20/2022:  Bilateral mammogram negative  Recheck left breast in 6 months(surgical changes)    1/11/2023:  Left breast microcal biopsy:  BREAST, LEFT, MICROCALCIFICATIONS, STEREOTACTIC GUIDED CORE BIOPSY   - FAT NECROSIS AND FIBROSIS WITH ASSOCIATED MICROCALCIFICATIONS     Chief Complaint:  No chief complaint on file.  DCIS follow up    History of Present Illness:   Paula Hernadez is a 63 y.o. female who presents for follow up of above.      Ms. Hernadez is doing well today.      She remains on Arimidex as of today, 10/18/2023      Family and Social history reviewed and is unchanged from 11/17/2021      ROS:  Review of Systems   Constitutional: Negative for fever.   Respiratory: Negative for shortness of breath.    Cardiovascular: Negative for chest pain and leg swelling.   Gastrointestinal: Negative for abdominal pain and blood in stool.   Genitourinary: Negative for hematuria.   Skin: Negative for rash.          Current Outpatient Medications:     anastrozole (ARIMIDEX) 1 mg Tab, TAKE 1 TABLET BY MOUTH EVERY DAY, Disp: 90 tablet, Rfl: 3    cyclobenzaprine (FLEXERIL) 5 MG tablet, Take 5 mg by mouth. 1-2 daily, Disp: , Rfl:     LINZESS 290 mcg Cap capsule, Take 290 mcg by mouth once daily., Disp: , Rfl:     omeprazole (PRILOSEC) 20 MG capsule, Take 20 mg by mouth once daily. , Disp: , Rfl:     traZODone (DESYREL) 50 MG tablet, Take 1 tablet (50 mg total) by mouth nightly as needed for Insomnia., Disp: 30 tablet, Rfl: 6    venlafaxine (EFFEXOR-XR) 37.5 MG 24 hr capsule,  Take 1 capsule (37.5 mg total) by mouth once daily., Disp: 30 capsule, Rfl: 11        Objective:       Physical Examination:     BP (!) 140/84   Pulse 85   Temp 97.3 °F (36.3 °C)   Wt 92.1 kg (203 lb)   LMP 01/01/2012 Comment: menopause  SpO2 97%   BMI 30.87 kg/m²     Physical Exam  Constitutional:       Appearance: She is well-developed and well-nourished.   HENT:      Head: Normocephalic and atraumatic.      Right Ear: External ear normal.      Left Ear: External ear normal.      Mouth/Throat:      Mouth: Oropharynx is clear and moist.   Eyes:      Conjunctiva/sclera: Conjunctivae normal.      Pupils: Pupils are equal, round, and reactive to light.   Neck:      Thyroid: No thyromegaly.      Trachea: No tracheal deviation.   Cardiovascular:      Rate and Rhythm: Normal rate and regular rhythm.      Heart sounds: Normal heart sounds.   Pulmonary:      Effort: Pulmonary effort is normal.      Breath sounds: Normal breath sounds.   Abdominal:      General: Bowel sounds are normal. There is no distension.      Palpations: Abdomen is soft. There is no mass.      Tenderness: There is no abdominal tenderness.   Musculoskeletal:         General: No edema.   Skin:     Findings: No rash.   Neurological:      Comments: Neuro intact througout   Psychiatric:         Mood and Affect: Mood and affect normal.         Behavior: Behavior normal.         Thought Content: Thought content normal.         Judgment: Judgment normal.    BREAST:Breast exam is negative bilaterally.  No masses, no rash, no skin changes. No adenopathy is present in the cervical, supraclavicular, infraclavicular or axillary regions.  No palpable lesion in area of left breast concern           Labs:   No results found for this or any previous visit (from the past 336 hour(s)).    CMP  Sodium   Date Value Ref Range Status   12/14/2022 139 136 - 145 mmol/L Final     Potassium   Date Value Ref Range Status   12/14/2022 4.3 3.5 - 5.1 mmol/L Final     Chloride  "  Date Value Ref Range Status   12/14/2022 103 95 - 110 mmol/L Final     CO2   Date Value Ref Range Status   12/14/2022 28 23 - 29 mmol/L Final     Glucose   Date Value Ref Range Status   12/14/2022 87 70 - 110 mg/dL Final     BUN   Date Value Ref Range Status   12/14/2022 18 8 - 23 mg/dL Final     Creatinine   Date Value Ref Range Status   12/14/2022 0.8 0.5 - 1.4 mg/dL Final     Calcium   Date Value Ref Range Status   12/14/2022 9.4 8.7 - 10.5 mg/dL Final     Total Protein   Date Value Ref Range Status   12/14/2022 7.3 6.0 - 8.4 g/dL Final     Albumin   Date Value Ref Range Status   12/14/2022 4.1 3.5 - 5.2 g/dL Final     Total Bilirubin   Date Value Ref Range Status   12/14/2022 0.7 0.1 - 1.0 mg/dL Final     Comment:     For infants and newborns, interpretation of results should be based  on gestational age, weight and in agreement with clinical  observations.    Premature Infant recommended reference ranges:  Up to 24 hours.............<8.0 mg/dL  Up to 48 hours............<12.0 mg/dL  3-5 days..................<15.0 mg/dL  6-29 days.................<15.0 mg/dL       Alkaline Phosphatase   Date Value Ref Range Status   12/14/2022 86 55 - 135 U/L Final     AST   Date Value Ref Range Status   12/14/2022 23 10 - 40 U/L Final     ALT   Date Value Ref Range Status   12/14/2022 16 10 - 44 U/L Final     Anion Gap   Date Value Ref Range Status   12/14/2022 8 8 - 16 mmol/L Final     eGFR if    Date Value Ref Range Status   06/15/2022 >60.0 >60 mL/min/1.73 m^2 Final     eGFR if non    Date Value Ref Range Status   06/15/2022 >60.0 >60 mL/min/1.73 m^2 Final     Comment:     Calculation used to obtain the estimated glomerular filtration  rate (eGFR) is the CKD-EPI equation.        No results found for: "CEA"  No results found for: "PSA"        Assessment/Plan:     Problem List Items Addressed This Visit       DCIS-Left Breast-ER/AL pos     Patient is doing well and appears ZENA.  Is due for " left mammogram in November and will place order now.  Will continue anastrozole and will continue to see her on a six month basis.          Relevant Orders    Mammo Digital Diagnostic Left with Yasm[facility]             Discussion:     Follow up in about 6 months (around 4/18/2024).      Electronically signed by Mike Lincoln

## 2023-10-19 DIAGNOSIS — D05.12 DUCTAL CARCINOMA IN SITU OF LEFT BREAST: ICD-10-CM

## 2023-10-19 DIAGNOSIS — D05.12 INTRADUCTAL CARCINOMA IN SITU OF LEFT BREAST: Primary | ICD-10-CM

## 2023-10-20 LAB
ALBUMIN SERPL-MCNC: 4.5 G/DL (ref 3.9–4.9)
ALBUMIN/GLOB SERPL: 1.8 {RATIO} (ref 1.2–2.2)
ALP SERPL-CCNC: 109 IU/L (ref 44–121)
ALT SERPL-CCNC: 13 IU/L (ref 0–32)
AST SERPL-CCNC: 22 IU/L (ref 0–40)
BASOPHILS # BLD AUTO: 0 X10E3/UL (ref 0–0.2)
BASOPHILS NFR BLD AUTO: 1 %
BILIRUB SERPL-MCNC: 0.4 MG/DL (ref 0–1.2)
BUN SERPL-MCNC: 15 MG/DL (ref 8–27)
BUN/CREAT SERPL: 18 (ref 12–28)
CALCIUM SERPL-MCNC: 10 MG/DL (ref 8.7–10.3)
CHLORIDE SERPL-SCNC: 102 MMOL/L (ref 96–106)
CHOLEST SERPL-MCNC: 246 MG/DL (ref 100–199)
CO2 SERPL-SCNC: 28 MMOL/L (ref 20–29)
CREAT SERPL-MCNC: 0.83 MG/DL (ref 0.57–1)
EOSINOPHIL # BLD AUTO: 0.2 X10E3/UL (ref 0–0.4)
EOSINOPHIL NFR BLD AUTO: 4 %
ERYTHROCYTE [DISTWIDTH] IN BLOOD BY AUTOMATED COUNT: 14.2 % (ref 11.7–15.4)
EST. GFR  (NO RACE VARIABLE): 79 ML/MIN/1.73
GLOBULIN SER CALC-MCNC: 2.5 G/DL (ref 1.5–4.5)
GLUCOSE SERPL-MCNC: 94 MG/DL (ref 70–99)
HCT VFR BLD AUTO: 43.1 % (ref 34–46.6)
HCV IGG SERPL QL IA: NON REACTIVE
HDLC SERPL-MCNC: 87 MG/DL
HGB BLD-MCNC: 13.8 G/DL (ref 11.1–15.9)
HIV 1+2 AB+HIV1 P24 AG SERPL QL IA: NON REACTIVE
IMM GRANULOCYTES # BLD AUTO: 0 X10E3/UL (ref 0–0.1)
IMM GRANULOCYTES NFR BLD AUTO: 0 %
LDLC SERPL CALC-MCNC: 131 MG/DL (ref 0–99)
LYMPHOCYTES # BLD AUTO: 1.4 X10E3/UL (ref 0.7–3.1)
LYMPHOCYTES NFR BLD AUTO: 34 %
MCH RBC QN AUTO: 28.9 PG (ref 26.6–33)
MCHC RBC AUTO-ENTMCNC: 32 G/DL (ref 31.5–35.7)
MCV RBC AUTO: 90 FL (ref 79–97)
MONOCYTES # BLD AUTO: 0.3 X10E3/UL (ref 0.1–0.9)
MONOCYTES NFR BLD AUTO: 8 %
NEUTROPHILS # BLD AUTO: 2.3 X10E3/UL (ref 1.4–7)
NEUTROPHILS NFR BLD AUTO: 53 %
PLATELET # BLD AUTO: 211 X10E3/UL (ref 150–450)
POTASSIUM SERPL-SCNC: 5.3 MMOL/L (ref 3.5–5.2)
PROT SERPL-MCNC: 7 G/DL (ref 6–8.5)
RBC # BLD AUTO: 4.78 X10E6/UL (ref 3.77–5.28)
SODIUM SERPL-SCNC: 142 MMOL/L (ref 134–144)
TRIGL SERPL-MCNC: 160 MG/DL (ref 0–149)
VLDLC SERPL CALC-MCNC: 28 MG/DL (ref 5–40)
WBC # BLD AUTO: 4.2 X10E3/UL (ref 3.4–10.8)

## 2023-11-29 ENCOUNTER — HOSPITAL ENCOUNTER (OUTPATIENT)
Dept: RADIOLOGY | Facility: HOSPITAL | Age: 63
Discharge: HOME | End: 2023-11-29
Attending: INTERNAL MEDICINE
Payer: COMMERCIAL

## 2023-11-29 DIAGNOSIS — D05.12 DUCTAL CARCINOMA IN SITU OF LEFT BREAST: ICD-10-CM

## 2023-11-29 DIAGNOSIS — D05.12 DUCTAL CARCINOMA IN SITU (DCIS) OF LEFT BREAST: ICD-10-CM

## 2023-11-29 PROCEDURE — 77065 DX MAMMO INCL CAD UNI: CPT | Mod: TC,PO,LT

## 2023-12-18 ENCOUNTER — OFFICE VISIT (OUTPATIENT)
Dept: FAMILY MEDICINE | Facility: CLINIC | Age: 63
End: 2023-12-18
Payer: COMMERCIAL

## 2023-12-18 VITALS
DIASTOLIC BLOOD PRESSURE: 72 MMHG | SYSTOLIC BLOOD PRESSURE: 130 MMHG | OXYGEN SATURATION: 98 % | WEIGHT: 203.69 LBS | HEART RATE: 80 BPM | TEMPERATURE: 96 F | BODY MASS INDEX: 30.87 KG/M2 | HEIGHT: 68 IN

## 2023-12-18 DIAGNOSIS — J06.9 UPPER RESPIRATORY TRACT INFECTION, UNSPECIFIED TYPE: ICD-10-CM

## 2023-12-18 DIAGNOSIS — U07.1 COVID-19 VIRUS DETECTED: ICD-10-CM

## 2023-12-18 DIAGNOSIS — U07.1 COVID: Primary | ICD-10-CM

## 2023-12-18 LAB
CTP QC/QA: YES
SARS-COV-2 RDRP RESP QL NAA+PROBE: POSITIVE

## 2023-12-18 PROCEDURE — 3008F PR BODY MASS INDEX (BMI) DOCUMENTED: ICD-10-PCS | Mod: CPTII,S$GLB,, | Performed by: INTERNAL MEDICINE

## 2023-12-18 PROCEDURE — 3075F PR MOST RECENT SYSTOLIC BLOOD PRESS GE 130-139MM HG: ICD-10-PCS | Mod: CPTII,S$GLB,, | Performed by: INTERNAL MEDICINE

## 2023-12-18 PROCEDURE — 99213 OFFICE O/P EST LOW 20 MIN: CPT | Mod: S$GLB,,, | Performed by: INTERNAL MEDICINE

## 2023-12-18 PROCEDURE — 87635 SARS-COV-2 COVID-19 AMP PRB: CPT | Mod: QW,S$GLB,, | Performed by: INTERNAL MEDICINE

## 2023-12-18 PROCEDURE — 87635: ICD-10-PCS | Mod: QW,S$GLB,, | Performed by: INTERNAL MEDICINE

## 2023-12-18 PROCEDURE — 99213 PR OFFICE/OUTPT VISIT, EST, LEVL III, 20-29 MIN: ICD-10-PCS | Mod: S$GLB,,, | Performed by: INTERNAL MEDICINE

## 2023-12-18 PROCEDURE — 3078F DIAST BP <80 MM HG: CPT | Mod: CPTII,S$GLB,, | Performed by: INTERNAL MEDICINE

## 2023-12-18 PROCEDURE — 3008F BODY MASS INDEX DOCD: CPT | Mod: CPTII,S$GLB,, | Performed by: INTERNAL MEDICINE

## 2023-12-18 PROCEDURE — 3075F SYST BP GE 130 - 139MM HG: CPT | Mod: CPTII,S$GLB,, | Performed by: INTERNAL MEDICINE

## 2023-12-18 PROCEDURE — 1159F MED LIST DOCD IN RCRD: CPT | Mod: CPTII,S$GLB,, | Performed by: INTERNAL MEDICINE

## 2023-12-18 PROCEDURE — 1159F PR MEDICATION LIST DOCUMENTED IN MEDICAL RECORD: ICD-10-PCS | Mod: CPTII,S$GLB,, | Performed by: INTERNAL MEDICINE

## 2023-12-18 PROCEDURE — 3078F PR MOST RECENT DIASTOLIC BLOOD PRESSURE < 80 MM HG: ICD-10-PCS | Mod: CPTII,S$GLB,, | Performed by: INTERNAL MEDICINE

## 2023-12-18 PROCEDURE — 99999 PR PBB SHADOW E&M-EST. PATIENT-LVL IV: ICD-10-PCS | Mod: PBBFAC,,, | Performed by: INTERNAL MEDICINE

## 2023-12-18 PROCEDURE — 99999 PR PBB SHADOW E&M-EST. PATIENT-LVL IV: CPT | Mod: PBBFAC,,, | Performed by: INTERNAL MEDICINE

## 2023-12-18 RX ORDER — GUAIFENESIN AND DEXTROMETHORPHAN HYDROBROMIDE 1200; 60 MG/1; MG/1
1 TABLET, EXTENDED RELEASE ORAL 2 TIMES DAILY
Qty: 20 TABLET | Refills: 0 | COMMUNITY
Start: 2023-12-18 | End: 2023-12-28

## 2023-12-18 RX ORDER — NIRMATRELVIR AND RITONAVIR 300-100 MG
KIT ORAL
Qty: 30 TABLET | Refills: 0 | Status: SHIPPED | OUTPATIENT
Start: 2023-12-18 | End: 2023-12-23

## 2023-12-18 NOTE — PATIENT INSTRUCTIONS
Your test was POSITIVE for COVID-19 (coronavirus).       Please isolate yourself at home.  You may leave home and/or return to work once the following conditions are met:    If you were not hospitalized and are not moderately to severely immunocompromised:   More than 5 days since symptoms first appeared AND  More than 24 hours fever free without medications AND  Symptoms are improving  Continue to wear a mask around others for 5 additional days.    If you were hospitalized OR are moderately to severely immunocompromised:  More than 20 days since symptoms first appeared  More than 24 hours fever free without medications  Symptoms have improved    If you had no symptoms but tested positive:  More than 5 days since the date of the first positive test (20 days if moderately to severely immunocompromised). If you develop symptoms, then use the guidelines above.  Continue to wear a mask around others for 5 additional days.      Contact Tracing    As one of the next steps, you will receive a call or text from the [place] Department of Health ([facility]) COVID-19 Tracing Team. See the contact information below so you know not to ignore the health departments call. It is important that you contact them back immediately so they can help.      Contact Tracer Number:  [phone]  Caller ID for most carriers: [facility]     What is contact tracing?  Contact tracing is a process that helps identify everyone who has been in close contact with an infected person. Contact tracers let those people know they may have been exposed and guide them on next steps. Confidentiality is important for everyone; no one will be told who may have exposed them to the virus.  Your involvement is important. The more we know about where and how this virus is spreading, the better chance we have at stopping it from spreading further.  What does exposure mean?  Exposure means you have been within 6 feet for more than 15 minutes with a person who  has or had COVID-19.  What kind of questions do the contact tracers ask?  A contact tracer will confirm your basic contact information including name, address, phone number, and next of kin, as well as asking about any symptoms you may have had. Theyll also ask you how you think you may have gotten sick, such as places where you may have been exposed to the virus, and people you were with. Those names will never be shared with anyone outside of that call, and will only be used to help trace and stop the spread of the virus.   I have privacy concerns. How will the state use my information?  Your privacy about your health is important. All calls are completed using call centers that use the appropriate health privacy protection measures (HIPAA compliance), meaning that your patient information is safe. No one will ever ask you any questions related to immigration status. Your health comes first.   Do I have to participate?  You do not have to participate, but we strongly encourage you to. Contact tracing can help us catch and control new outbreaks as theyre developing to keep your friends and family safe.   What if I dont hear from anyone?  If you dont receive a call within 24 hours, you can call the number above right away to inquire about your status. That line is open from 8:00 am - 8:00 p.m., 7 days a week.  Contact tracing saves lives! Together, we have the power to beat this virus and keep our loved ones and neighbors safe.    For more information see CDC link below.      https://www.cdc.gov/coronavirus/2019-ncov/hcp/guidance-prevent-spread.html#precautions        Sources:  [facility], [place] Department [facility] and [facility]           Sincerely,     Harpreet Gonsalez Jr, MD

## 2023-12-18 NOTE — PROGRESS NOTES
Subjective:       Patient ID: Paula Hernadez is a 63 y.o. female.    Chief Complaint: Sore Throat    Sore Throat   This is a new problem. The current episode started yesterday. The problem has been rapidly worsening. Neither side of throat is experiencing more pain than the other. There has been no fever. The pain is at a severity of 4/10. The pain is moderate. Associated symptoms include headaches, neck pain and swollen glands. Pertinent negatives include no abdominal pain, congestion, coughing, diarrhea, drooling, ear discharge, ear pain, hoarse voice, plugged ear sensation, shortness of breath, stridor, trouble swallowing or vomiting. Associated symptoms comments: Post nasal drip but no congestion, rhinorrhea, cough.. She has had no exposure to strep or mono. Exposure to: family members with COVID; she did COVID test yesterday which was negative. She has tried nothing for the symptoms.     Review of Systems   Constitutional:  Negative for activity change, appetite change, chills, diaphoresis, fatigue, fever and unexpected weight change.   HENT:  Positive for postnasal drip and sore throat. Negative for congestion, drooling, ear discharge, ear pain, hearing loss, hoarse voice, mouth sores, nosebleeds, rhinorrhea, sinus pressure, sinus pain, sneezing, tinnitus, trouble swallowing and voice change.    Eyes:  Positive for visual disturbance. Negative for photophobia, pain, discharge, redness and itching.   Respiratory:  Negative for apnea, cough, choking, chest tightness, shortness of breath, wheezing and stridor.    Cardiovascular:  Negative for chest pain, palpitations and leg swelling.   Gastrointestinal:  Positive for constipation. Negative for abdominal distention, abdominal pain, blood in stool, diarrhea, nausea and vomiting.   Endocrine: Negative for cold intolerance, heat intolerance, polydipsia and polyuria.   Genitourinary:  Negative for decreased urine volume, difficulty urinating, dyspareunia, dysuria,  enuresis, frequency, genital sores, hematuria, menstrual problem, pelvic pain, urgency, vaginal bleeding, vaginal discharge and vaginal pain.   Musculoskeletal:  Positive for arthralgias (shoulders) and neck pain. Negative for back pain, gait problem, joint swelling, myalgias and neck stiffness.   Skin:  Negative for rash and wound.   Allergic/Immunologic: Negative for environmental allergies, food allergies and immunocompromised state.   Neurological:  Positive for headaches. Negative for dizziness, tremors, seizures, syncope, facial asymmetry, speech difficulty, weakness, light-headedness and numbness.   Hematological:  Negative for adenopathy. Bruises/bleeds easily.   Psychiatric/Behavioral:  Positive for sleep disturbance. Negative for confusion, decreased concentration, hallucinations, self-injury and suicidal ideas. The patient is not nervous/anxious.        Past Medical History:   Diagnosis Date    Breast cancer     Chronic insomnia     Floaters in visual field     Neuromuscular disorder     Seasonal allergies     Seborrheic keratoses     Thumb pain 2018      Past Surgical History:   Procedure Laterality Date    BREAST BIOPSY      BREAST MASS EXCISION      COSMETIC SURGERY      tummy tuck    CYST REMOVAL  05/27/2021    chest    INTERPOSITION ARTHROPLASTY OF CARPOMETACARPAL JOINTS Left 06/09/2021    Procedure: INTERPOSITION ARTHROPLASTY, CMC JOINT;  Surgeon: Giovani Molina MD;  Location: [facility] OR;  Service: Orthopedics;  Laterality: Left;    MASTECTOMY, PARTIAL Left 10/19/2021    Procedure: MASTECTOMY, PARTIAL;  Surgeon: Lila Perales MD;  Location: [facility] OR;  Service: General;  Laterality: Left;    NEEDLE LOCALIZATION Left 10/19/2021    Procedure: NEEDLE LOCALIZATION;  Surgeon: Lila Perales MD;  Location: [facility] OR;  Service: General;  Laterality: Left;  LOCALIZER DONE 10/15    SHOULDER SURGERY Left     TONSILLECTOMY         Family History   Problem Relation Age of Onset    Stroke Mother     Prostate cancer  Father     Melanoma Neg Hx     Psoriasis Neg Hx     Lupus Neg Hx     Eczema Neg Hx        Social History     Socioeconomic History    Marital status:     Number of children: 1   Occupational History    Occupation: clerical   Tobacco Use    Smoking status: Former     Current packs/day: 0.00     Average packs/day: 0.3 packs/day for 27.8 years (6.9 ttl pk-yrs)     Types: Cigarettes     Start date:      Quit date: 10/6/2002     Years since quittin.2    Smokeless tobacco: Never   Substance and Sexual Activity    Alcohol use: Yes     Alcohol/week: 4.0 standard drinks of alcohol     Types: 4 Standard drinks or equivalent per week     Comment: daiquiris, weekly    Drug use: Yes     Frequency: 2.0 times per week     Types: Marijuana     Comment: thc gummies    Sexual activity: Yes     Partners: Female     Birth control/protection: Post-menopausal   Social History Narrative    Live with      Social Determinants of Health     Financial Resource Strain: Low Risk  (2023)    Overall Financial Resource Strain (CARDIA)     Difficulty of Paying Living Expenses: Not hard at all   Food Insecurity: No Food Insecurity (2023)    Hunger Vital Sign     Worried About Running Out of Food in the Last Year: Never true     Ran Out of Food in the Last Year: Never true   Transportation Needs: No Transportation Needs (2023)    PRAPARE - Transportation     Lack of Transportation (Medical): No     Lack of Transportation (Non-Medical): No   Physical Activity: Unknown (2023)    Exercise Vital Sign     Days of Exercise per Week: 0 days   Stress: Stress Concern Present (2023)    [employer] [place] [employer] - Occupational Stress Questionnaire     Feeling of Stress : To some extent   Social Connections: Unknown (2023)    Social Connection and Isolation Panel [NHANES]     Frequency of Communication with Friends and Family: More than three times a week     Frequency of Social Gatherings  "with Friends and Family: Twice a week     Active Member of Clubs or Organizations: No     Attends Club or Organization Meetings: Never     Marital Status:    Housing Stability: Low Risk  (12/18/2023)    Housing Stability Vital Sign     Unable to Pay for Housing in the Last Year: No     Number of Places Lived in the Last Year: 1     Unstable Housing in the Last Year: No       Current Outpatient Medications   Medication Sig Dispense Refill    anastrozole (ARIMIDEX) 1 mg Tab TAKE 1 TABLET BY MOUTH EVERY DAY 90 tablet 3    cyclobenzaprine (FLEXERIL) 5 MG tablet Take 5 mg by mouth. 1-2 daily      LINZESS 290 mcg Cap capsule Take 290 mcg by mouth once daily.      omeprazole (PRILOSEC) 20 MG capsule Take 20 mg by mouth once daily.       traZODone (DESYREL) 50 MG tablet Take 1 tablet (50 mg total) by mouth nightly as needed for Insomnia. 30 tablet 6    venlafaxine (EFFEXOR-XR) 37.5 MG 24 hr capsule Take 1 capsule (37.5 mg total) by mouth once daily. 30 capsule 11    dextromethorphan-guaiFENesin (MUCINEX DM) 60-1,200 mg per 12 hr tablet Take 1 tablet by mouth 2 (two) times daily. for 10 days 20 tablet 0    loratadine-pseudoephedrine  mg (CLARITIN-D 24-HOUR)  mg per 24 hr tablet Take 1 tablet by mouth once daily. for 10 days 10 tablet 0    nirmatrelvir-ritonavir (PAXLOVID) 300 mg (150 mg x 2)-100 mg copackaged tablets (EUA) Take 3 tablets by mouth 2 (two) times daily. Each dose contains 2 nirmatrelvir (pink tablets) and 1 ritonavir (white tablet). Take all 3 tablets together 30 tablet 0     No current facility-administered medications for this visit.       Review of patient's allergies indicates:   Allergen Reactions    Codeine Nausea Only     Objective:      Blood pressure 130/72, pulse 80, temperature 96.4 °F (35.8 °C), temperature source Temporal, height 5' 8" (1.727 m), weight 92.4 kg (203 lb 11.3 oz), last menstrual period 01/01/2012, SpO2 98 %. Body mass index is 30.97 kg/m².   Physical " Exam  Constitutional:       General: She is not in acute distress.     Appearance: She is well-developed. She is not ill-appearing, toxic-appearing or diaphoretic.   HENT:      Head: Normocephalic.      Nose: Nose normal. No rhinorrhea.      Right Sinus: No maxillary sinus tenderness or frontal sinus tenderness.      Left Sinus: No maxillary sinus tenderness or frontal sinus tenderness.      Mouth/Throat:      Pharynx: Posterior oropharyngeal erythema (mild) present. No oropharyngeal exudate.      Tonsils: No tonsillar exudate.   Cardiovascular:      Rate and Rhythm: Normal rate and regular rhythm.      Heart sounds: Normal heart sounds. No murmur heard.     No friction rub. No gallop.   Pulmonary:      Effort: Pulmonary effort is normal. No tachypnea, accessory muscle usage or respiratory distress.      Breath sounds: Normal breath sounds. No wheezing, rhonchi or rales.   Neurological:      Mental Status: She is alert.           Office Visit on 12/18/2023   Component Date Value Ref Range Status    POC Rapid COVID 12/18/2023 Positive (A)  Negative Final     Acceptable 12/18/2023 Yes   Final   ]  Assessment:       1. COVID    2. Upper respiratory tract infection, unspecified type        Plan:       Paula was seen today for sore throat.    Diagnoses and all orders for this visit:    COVID  -     nirmatrelvir-ritonavir (PAXLOVID) 300 mg (150 mg x 2)-100 mg copackaged tablets (EUA); Take 3 tablets by mouth 2 (two) times daily. Each dose contains 2 nirmatrelvir (pink tablets) and 1 ritonavir (white tablet). Take all 3 tablets together    Upper respiratory tract infection, unspecified type  -     POCT COVID-19 Rapid Screening  -     loratadine-pseudoephedrine  mg (CLARITIN-D 24-HOUR)  mg per 24 hr tablet; Take 1 tablet by mouth once daily. for 10 days  -     dextromethorphan-guaiFENesin (MUCINEX DM) 60-1,200 mg per 12 hr tablet; Take 1 tablet by mouth 2 (two) times daily. for 10 days

## 2023-12-19 ENCOUNTER — TELEPHONE (OUTPATIENT)
Dept: FAMILY MEDICINE | Facility: CLINIC | Age: 63
End: 2023-12-19
Payer: COMMERCIAL

## 2023-12-19 NOTE — TELEPHONE ENCOUNTER
----- Message from Rosey Burton sent at 12/19/2023  9:15 AM CST -----  Regarding: Needs Medical Advice  Contact: patient at [phone]  Type: Needs Medical Advice  Who Called:  patient at [phone]    Additional Information: patient would like to review the medical directions for her medication. Please call and advise. Thank you      nirmatrelvir-ritonavir (PAXLOVID) 300 mg (150 mg x 2)-100 mg copackaged tablets (EUA) 30 tablet 0 12/18/2023 12/23/2023   Sig: Take 3 tablets by mouth 2 (two) times daily. Each dose contains 2 nirmatrelvir (pink tablets) and 1 ritonavir (white tablet). Take all 3 tablets together   Sent to pharmacy as: nirmatrelvir-ritonavir (PAXLOVID) 300 mg (150 mg x 2)-100 mg copackaged tablets (EUA)   E-Prescribing Status: Receipt confirmed by pharmacy (12/18/2023 12:19 PM CST)

## 2024-02-01 ENCOUNTER — PATIENT MESSAGE (OUTPATIENT)
Dept: FAMILY MEDICINE | Facility: CLINIC | Age: 64
End: 2024-02-01
Payer: COMMERCIAL

## 2024-02-01 RX ORDER — TEMAZEPAM 15 MG/1
15 CAPSULE ORAL NIGHTLY PRN
Qty: 30 CAPSULE | Refills: 1 | Status: SHIPPED | OUTPATIENT
Start: 2024-02-01

## 2024-02-14 ENCOUNTER — TELEPHONE (OUTPATIENT)
Dept: FAMILY MEDICINE | Facility: CLINIC | Age: 64
End: 2024-02-14

## 2024-02-14 ENCOUNTER — HOSPITAL ENCOUNTER (OUTPATIENT)
Dept: RADIOLOGY | Facility: HOSPITAL | Age: 64
Discharge: HOME | End: 2024-02-14
Attending: INTERNAL MEDICINE
Payer: COMMERCIAL

## 2024-02-14 ENCOUNTER — OFFICE VISIT (OUTPATIENT)
Dept: FAMILY MEDICINE | Facility: CLINIC | Age: 64
End: 2024-02-14
Payer: COMMERCIAL

## 2024-02-14 VITALS
BODY MASS INDEX: 31.19 KG/M2 | HEART RATE: 77 BPM | SYSTOLIC BLOOD PRESSURE: 120 MMHG | OXYGEN SATURATION: 99 % | HEIGHT: 68 IN | DIASTOLIC BLOOD PRESSURE: 86 MMHG | TEMPERATURE: 97 F | WEIGHT: 205.81 LBS

## 2024-02-14 DIAGNOSIS — R06.09 DOE (DYSPNEA ON EXERTION): ICD-10-CM

## 2024-02-14 DIAGNOSIS — R07.9 CHEST PAIN, UNSPECIFIED TYPE: ICD-10-CM

## 2024-02-14 DIAGNOSIS — R79.89 POSITIVE D DIMER: ICD-10-CM

## 2024-02-14 DIAGNOSIS — R07.89 SENSATION OF CHEST TIGHTNESS: ICD-10-CM

## 2024-02-14 DIAGNOSIS — R00.2 PALPITATIONS: ICD-10-CM

## 2024-02-14 DIAGNOSIS — R07.9 CHEST PAIN, UNSPECIFIED TYPE: Primary | ICD-10-CM

## 2024-02-14 DIAGNOSIS — R06.09 DOE (DYSPNEA ON EXERTION): Primary | ICD-10-CM

## 2024-02-14 LAB
OHS QRS DURATION: 86 MS
OHS QTC CALCULATION: 439 MS

## 2024-02-14 PROCEDURE — 3079F DIAST BP 80-89 MM HG: CPT | Mod: CPTII,S$GLB,, | Performed by: INTERNAL MEDICINE

## 2024-02-14 PROCEDURE — 99999 PR PBB SHADOW E&M-EST. PATIENT-LVL IV: CPT | Mod: PBBFAC,,, | Performed by: INTERNAL MEDICINE

## 2024-02-14 PROCEDURE — 71046 X-RAY EXAM CHEST 2 VIEWS: CPT | Mod: 26,,, | Performed by: RADIOLOGY

## 2024-02-14 PROCEDURE — 71275 CT ANGIOGRAPHY CHEST: CPT | Mod: TC

## 2024-02-14 PROCEDURE — 71046 X-RAY EXAM CHEST 2 VIEWS: CPT | Mod: TC

## 2024-02-14 PROCEDURE — 1159F MED LIST DOCD IN RCRD: CPT | Mod: CPTII,S$GLB,, | Performed by: INTERNAL MEDICINE

## 2024-02-14 PROCEDURE — 3074F SYST BP LT 130 MM HG: CPT | Mod: CPTII,S$GLB,, | Performed by: INTERNAL MEDICINE

## 2024-02-14 PROCEDURE — 3008F BODY MASS INDEX DOCD: CPT | Mod: CPTII,S$GLB,, | Performed by: INTERNAL MEDICINE

## 2024-02-14 PROCEDURE — 93010 ELECTROCARDIOGRAM REPORT: CPT | Mod: S$GLB,,, | Performed by: INTERNAL MEDICINE

## 2024-02-14 PROCEDURE — 93005 ELECTROCARDIOGRAM TRACING: CPT | Mod: S$GLB,,, | Performed by: INTERNAL MEDICINE

## 2024-02-14 PROCEDURE — 25500020 PHARM REV CODE 255: Performed by: INTERNAL MEDICINE

## 2024-02-14 PROCEDURE — 99214 OFFICE O/P EST MOD 30 MIN: CPT | Mod: S$GLB,,, | Performed by: INTERNAL MEDICINE

## 2024-02-14 RX ORDER — ACETAMINOPHEN 500 MG
1000 TABLET ORAL EVERY 6 HOURS PRN
COMMUNITY

## 2024-02-14 RX ORDER — ASPIRIN 81 MG
100 TABLET, DELAYED RELEASE (ENTERIC COATED) ORAL 2 TIMES DAILY
COMMUNITY

## 2024-02-14 RX ADMIN — IOHEXOL 100 ML: 350 INJECTION, SOLUTION INTRAVENOUS at 03:02

## 2024-02-14 NOTE — PROGRESS NOTES
Subjective:       Patient ID: Paula Hernadez is a 64 y.o. female.    Chief Complaint: Shortness of Breath (X 4 days), Chest Congestion (X 4 days), and Fatigue (X 4 days)    Here for evaluation of SOB and palpitations.  She reports she was walking in [employer] over the weekend and started feeling some SOB, palpitations, tightness in chest.  She decided to leave and went to walk to her car and had to stop and rest.   She had started taking appetite suppressant several days prior to that.     Shortness of Breath  This is a recurrent problem. The current episode started in the past 7 days (similar episode about a year ago but this episode started over the weekend). The problem occurs rarely. The problem has been waxing and waning. The average episode lasts 1 hours. Associated symptoms include headaches. Pertinent negatives include no abdominal pain, chest pain, ear pain, fever, leg swelling, neck pain, rash, rhinorrhea, sore throat, vomiting or wheezing. The symptoms are aggravated by exercise (she took some phentermine last week but hasn't had any since the day she had symptoms). Associated symptoms comments: Has been having palpitations; sensation of fast heart rate and tightness.  Very fatigued/no energy  . The patient has no known risk factors for DVT/PE. She has tried rest for the symptoms. There is no history of allergies, aspirin allergies, asthma, bronchiolitis, CAD, chronic lung disease, COPD, DVT, a heart failure, PE, pneumonia or a recent surgery.     Review of Systems   Constitutional:  Positive for fatigue. Negative for activity change, appetite change, chills, diaphoresis, fever and unexpected weight change.   HENT:  Positive for congestion. Negative for ear discharge, ear pain, hearing loss, mouth sores, nosebleeds, postnasal drip, rhinorrhea, sinus pressure, sinus pain, sneezing, sore throat, tinnitus, trouble swallowing and voice change.    Eyes:  Negative for photophobia, pain, discharge, redness,  itching and visual disturbance.   Respiratory:  Positive for cough, chest tightness and shortness of breath. Negative for apnea, choking and wheezing.    Cardiovascular:  Positive for palpitations. Negative for chest pain and leg swelling.   Gastrointestinal:  Positive for constipation. Negative for abdominal distention, abdominal pain, blood in stool, diarrhea, nausea and vomiting.   Endocrine: Negative for cold intolerance, heat intolerance, polydipsia and polyuria.   Genitourinary:  Negative for decreased urine volume, difficulty urinating, dyspareunia, dysuria, enuresis, frequency, genital sores, hematuria, menstrual problem, pelvic pain, urgency, vaginal bleeding, vaginal discharge and vaginal pain.   Musculoskeletal:  Negative for arthralgias, back pain, gait problem, joint swelling, myalgias, neck pain and neck stiffness.   Skin:  Negative for rash and wound.   Allergic/Immunologic: Negative for environmental allergies, food allergies and immunocompromised state.   Neurological:  Positive for headaches. Negative for dizziness, tremors, seizures, syncope, facial asymmetry, speech difficulty, weakness, light-headedness and numbness.   Hematological:  Negative for adenopathy. Bruises/bleeds easily.   Psychiatric/Behavioral:  Negative for confusion, decreased concentration, hallucinations, self-injury, sleep disturbance and suicidal ideas. The patient is not nervous/anxious.        Past Medical History:   Diagnosis Date    Breast cancer     Chronic insomnia     Floaters in visual field     Neuromuscular disorder     Seasonal allergies     Seborrheic keratoses     Thumb pain 2018      Past Surgical History:   Procedure Laterality Date    BREAST BIOPSY      BREAST MASS EXCISION      COSMETIC SURGERY      tummy tuck    CYST REMOVAL  05/27/2021    chest    INTERPOSITION ARTHROPLASTY OF CARPOMETACARPAL JOINTS Left 06/09/2021    Procedure: INTERPOSITION ARTHROPLASTY, CMC JOINT;  Surgeon: Giovani Molina MD;  Location:  [facility] OR;  Service: Orthopedics;  Laterality: Left;    MASTECTOMY, PARTIAL Left 10/19/2021    Procedure: MASTECTOMY, PARTIAL;  Surgeon: Lila Perales MD;  Location: [facility] OR;  Service: General;  Laterality: Left;    NEEDLE LOCALIZATION Left 10/19/2021    Procedure: NEEDLE LOCALIZATION;  Surgeon: Lila Perales MD;  Location: [facility] OR;  Service: General;  Laterality: Left;  LOCALIZER DONE 10/15    SHOULDER SURGERY Left     TONSILLECTOMY         Family History   Problem Relation Age of Onset    Stroke Mother     Prostate cancer Father     Melanoma Neg Hx     Psoriasis Neg Hx     Lupus Neg Hx     Eczema Neg Hx        Social History     Socioeconomic History    Marital status:     Number of children: 1   Occupational History    Occupation: clerical   Tobacco Use    Smoking status: Former     Current packs/day: 0.00     Average packs/day: 0.3 packs/day for 27.8 years (6.9 ttl pk-yrs)     Types: Cigarettes     Start date:      Quit date: 10/6/2002     Years since quittin.3    Smokeless tobacco: Never   Substance and Sexual Activity    Alcohol use: Yes     Alcohol/week: 4.0 standard drinks of alcohol     Types: 4 Standard drinks or equivalent per week     Comment: daiquiris, weekly    Drug use: Yes     Frequency: 2.0 times per week     Types: Marijuana     Comment: thc gummies    Sexual activity: Yes     Partners: Female     Birth control/protection: Post-menopausal   Social History Narrative    Live with      Social Determinants of Health     Financial Resource Strain: Low Risk  (2023)    Overall Financial Resource Strain (CARDIA)     Difficulty of Paying Living Expenses: Not hard at all   Food Insecurity: No Food Insecurity (2023)    Hunger Vital Sign     Worried About Running Out of Food in the Last Year: Never true     Ran Out of Food in the Last Year: Never true   Transportation Needs: No Transportation Needs (2023)    PRAPARE - Transportation     Lack of Transportation (Medical):  No     Lack of Transportation (Non-Medical): No   Physical Activity: Unknown (12/18/2023)    Exercise Vital Sign     Days of Exercise per Week: 0 days   Stress: Stress Concern Present (12/18/2023)    [employer] - Occupational Stress Questionnaire     Feeling of Stress : To some extent   Social Connections: Unknown (12/18/2023)    Social Connection and Isolation Panel [NHANES]     Frequency of Communication with Friends and Family: More than three times a week     Frequency of Social Gatherings with Friends and Family: Twice a week     Active Member of Clubs or Organizations: No     Attends Club or Organization Meetings: Never     Marital Status:    Housing Stability: Low Risk  (12/18/2023)    Housing Stability Vital Sign     Unable to Pay for Housing in the Last Year: No     Number of Places Lived in the Last Year: 1     Unstable Housing in the Last Year: No       Current Outpatient Medications   Medication Sig Dispense Refill    acetaminophen (TYLENOL) 500 MG tablet Take 1,000 mg by mouth every 6 (six) hours as needed for Pain.      anastrozole (ARIMIDEX) 1 mg Tab TAKE 1 TABLET BY MOUTH EVERY DAY 90 tablet 3    cyclobenzaprine (FLEXERIL) 5 MG tablet Take 5 mg by mouth. 1-2 daily      docusate sodium (STOOL SOFTENER) 100 mg capsule Take 100 mg by mouth 2 (two) times daily.      omeprazole (PRILOSEC) 20 MG capsule Take 20 mg by mouth once daily.       temazepam (RESTORIL) 15 mg Cap Take 1 capsule (15 mg total) by mouth nightly as needed (insomnia). 30 capsule 1    venlafaxine (EFFEXOR-XR) 37.5 MG 24 hr capsule Take 1 capsule (37.5 mg total) by mouth once daily. 30 capsule 11     No current facility-administered medications for this visit.       Review of patient's allergies indicates:   Allergen Reactions    Codeine Nausea Only     Objective:    HPI     Shortness of Breath     Additional comments: X 4 days           Chest Congestion     Additional comments: X 4 days            "Fatigue     Additional comments: X 4 days          Last edited by Geo Kumar MA on 2/14/2024 10:08 AM.      Blood pressure 120/86, pulse 77, temperature 96.7 °F (35.9 °C), temperature source Temporal, height 5' 8" (1.727 m), weight 93.3 kg (205 lb 12.8 oz), last menstrual period 01/01/2012, SpO2 99 %. Body mass index is 31.29 kg/m².   Physical Exam  Vitals and nursing note reviewed.   Constitutional:       General: She is not in acute distress.     Appearance: She is well-developed. She is obese. She is not ill-appearing, toxic-appearing or diaphoretic.   HENT:      Head: Normocephalic and atraumatic.   Eyes:      General: No scleral icterus.        Right eye: No discharge.         Left eye: No discharge.      Conjunctiva/sclera: Conjunctivae normal.   Neck:      Vascular: No carotid bruit.   Cardiovascular:      Rate and Rhythm: Normal rate and regular rhythm.      Heart sounds: Normal heart sounds. No murmur heard.  Pulmonary:      Effort: Pulmonary effort is normal. No respiratory distress.      Breath sounds: Normal breath sounds. No decreased breath sounds, wheezing, rhonchi or rales.   Abdominal:      General: There is no distension.      Palpations: Abdomen is soft.      Tenderness: There is no abdominal tenderness. There is no guarding or rebound.   Musculoskeletal:      Right lower leg: No edema.      Left lower leg: No edema.   Skin:     General: Skin is warm and dry.   Neurological:      Mental Status: She is alert.      Motor: No tremor.   Psychiatric:         Mood and Affect: Mood normal.         Speech: Speech normal.         Behavior: Behavior normal.           EKG NSR without ischemic changes    Assessment:       1. MCGOWAN (dyspnea on exertion)    2. Sensation of chest tightness    3. Palpitations        Plan:       Paula was seen today for shortness of breath, chest congestion and fatigue.    Diagnoses and all orders for this visit:    MCGOWAN (dyspnea on exertion)  Comments:  Will send to " [facility] for labs and CXR.  Encouraged to go to ER if has another significant episode.  Orders:  -     IN OFFICE EKG 12-LEAD (to Muse)  -     X-Ray Chest PA And Lateral; Future  -     TROPONIN I; Future  -     CBC Auto Differential; Future  -     Comprehensive Metabolic Panel; Future  -     Nuclear Stress - Cardiology Interpreted; Future  -     D dimer, quantitative; Future  -     BNP; Future    Sensation of chest tightness  Comments:  Will plan stress test unless there is evidence of recent ischemia on labs in which case she would need to be admitted for Cardiology evaluation  Orders:  -     IN OFFICE EKG 12-LEAD (to Muse)  -     TROPONIN I; Future  -     CBC Auto Differential; Future  -     Comprehensive Metabolic Panel; Future  -     Nuclear Stress - Cardiology Interpreted; Future    Palpitations  -     IN OFFICE EKG 12-LEAD (to Muse)  -     TROPONIN I; Future  -     CBC Auto Differential; Future  -     TSH; Future  -     Comprehensive Metabolic Panel; Future  -     Nuclear Stress - Cardiology Interpreted; Future

## 2024-02-14 NOTE — TELEPHONE ENCOUNTER
D dimer results 0.62 per [facility]  Sonia Valdez. Critical value flow sheet completed.

## 2024-02-14 NOTE — TELEPHONE ENCOUNTER
----- Message from Lisbeth De Guzman sent at 2/14/2024 12:11 PM CST -----  Contact: [facility]  The lab has a  critical result from the D Dimer. MYA @[phone]

## 2024-03-04 DIAGNOSIS — G47.00 INSOMNIA, UNSPECIFIED TYPE: ICD-10-CM

## 2024-03-05 DIAGNOSIS — D05.12 DUCTAL CARCINOMA IN SITU (DCIS) OF LEFT BREAST: ICD-10-CM

## 2024-03-05 DIAGNOSIS — Z79.811 LONG TERM (CURRENT) USE OF AROMATASE INHIBITORS: ICD-10-CM

## 2024-03-05 RX ORDER — TRAZODONE HYDROCHLORIDE 50 MG/1
50 TABLET ORAL NIGHTLY
Qty: 90 TABLET | Refills: 2 | OUTPATIENT
Start: 2024-03-05

## 2024-03-05 NOTE — TELEPHONE ENCOUNTER
Patient advised, [facility]   Will need to register on the first floor at the main entrance.   Patient advised that arrival time is 7:40am.  Patient advised that she may be here about 3.5-4 hours, and may want to bring something to occupy their time, as there will be periods of waiting.    Patient advised, may take her medications prior to testing if you need to.  Advised if she needs to eat to take her medications, please keep it light, like toast and juice.    Patient advised to avoid all caffeine 12 hours prior to testing.  This includes decaf tea and coffee.    Will provide peanut butter crackers for a snack after stress test.  If patient would prefer something else, please bring a snack from home.    Wear comfortable clothing.   No lotions, oils, or powders to the upper chest area. May wear deodorant.    No metal jewelry, buttons, or zippers to the upper body.  Patient verbalizes understanding of instructions.

## 2024-03-06 ENCOUNTER — HOSPITAL ENCOUNTER (OUTPATIENT)
Dept: RADIOLOGY | Facility: HOSPITAL | Age: 64
Discharge: HOME | End: 2024-03-06
Attending: INTERNAL MEDICINE
Payer: COMMERCIAL

## 2024-03-06 ENCOUNTER — HOSPITAL ENCOUNTER (OUTPATIENT)
Dept: CARDIOLOGY | Facility: HOSPITAL | Age: 64
Discharge: HOME | End: 2024-03-06
Attending: INTERNAL MEDICINE
Payer: COMMERCIAL

## 2024-03-06 DIAGNOSIS — R00.2 PALPITATIONS: ICD-10-CM

## 2024-03-06 DIAGNOSIS — R06.09 DOE (DYSPNEA ON EXERTION): ICD-10-CM

## 2024-03-06 DIAGNOSIS — R07.89 SENSATION OF CHEST TIGHTNESS: ICD-10-CM

## 2024-03-06 LAB
CV STRESS BASE HR: 84 BPM
DIASTOLIC BLOOD PRESSURE: 67 MMHG
EJECTION FRACTION- HIGH: 65 %
END DIASTOLIC INDEX-HIGH: 153 ML/M2
END DIASTOLIC INDEX-LOW: 93 ML/M2
END SYSTOLIC INDEX-HIGH: 71 ML/M2
END SYSTOLIC INDEX-LOW: 31 ML/M2
NUC REST DIASTOLIC VOLUME INDEX: 62
NUC REST EJECTION FRACTION: 66
NUC REST SYSTOLIC VOLUME INDEX: 21
NUC STRESS DIASTOLIC VOLUME INDEX: 57
NUC STRESS EJECTION FRACTION: 63 %
NUC STRESS SYSTOLIC VOLUME INDEX: 21
OHS CV CPX 1 MINUTE RECOVERY HEART RATE: 144 BPM
OHS CV CPX 85 PERCENT MAX PREDICTED HEART RATE MALE: 133
OHS CV CPX ESTIMATED METS: 6
OHS CV CPX MAX PREDICTED HEART RATE: 156
OHS CV CPX PATIENT IS FEMALE: 1
OHS CV CPX PATIENT IS MALE: 0
OHS CV CPX PEAK DIASTOLIC BLOOD PRESSURE: 79 MMHG
OHS CV CPX PEAK HEAR RATE: 146 BPM
OHS CV CPX PEAK RATE PRESSURE PRODUCT: NORMAL
OHS CV CPX PEAK SYSTOLIC BLOOD PRESSURE: 172 MMHG
OHS CV CPX PERCENT MAX PREDICTED HEART RATE ACHIEVED: 98
OHS CV CPX RATE PRESSURE PRODUCT PRESENTING: 9576
RETIRED EF AND QEF - SEE NOTES: 53 %
STRESS ECHO POST EXERCISE DUR MIN: 4 MINUTES
STRESS ECHO POST EXERCISE DUR SEC: 12 SECONDS
SYSTOLIC BLOOD PRESSURE: 114 MMHG

## 2024-03-06 PROCEDURE — A9502 TC99M TETROFOSMIN: HCPCS | Performed by: INTERNAL MEDICINE

## 2024-03-06 PROCEDURE — 93016 CV STRESS TEST SUPVJ ONLY: CPT | Mod: ,,, | Performed by: NURSE PRACTITIONER

## 2024-03-06 PROCEDURE — 93018 CV STRESS TEST I&R ONLY: CPT | Mod: ,,, | Performed by: INTERNAL MEDICINE

## 2024-03-06 PROCEDURE — 78452 HT MUSCLE IMAGE SPECT MULT: CPT | Mod: 26,,, | Performed by: INTERNAL MEDICINE

## 2024-03-06 PROCEDURE — 78452 HT MUSCLE IMAGE SPECT MULT: CPT

## 2024-03-06 RX ORDER — ANASTROZOLE 1 MG/1
1 TABLET ORAL DAILY
Qty: 90 TABLET | Refills: 3 | Status: SHIPPED | OUTPATIENT
Start: 2024-03-06

## 2024-03-06 RX ADMIN — TETROFOSMIN 12 MILLICURIE: 1.38 INJECTION, POWDER, LYOPHILIZED, FOR SOLUTION INTRAVENOUS at 07:03

## 2024-03-06 RX ADMIN — TETROFOSMIN 24.7 MILLICURIE: 1.38 INJECTION, POWDER, LYOPHILIZED, FOR SOLUTION INTRAVENOUS at 09:03

## 2024-04-08 ENCOUNTER — HOSPITAL ENCOUNTER (OUTPATIENT)
Dept: PREADMISSION TESTING | Facility: HOSPITAL | Age: 64
Discharge: HOME | End: 2024-04-08
Attending: PLASTIC SURGERY
Payer: COMMERCIAL

## 2024-04-08 ENCOUNTER — HOSPITAL ENCOUNTER (OUTPATIENT)
Dept: RADIOLOGY | Facility: HOSPITAL | Age: 64
Discharge: HOME | End: 2024-04-08
Attending: PLASTIC SURGERY
Payer: COMMERCIAL

## 2024-04-08 VITALS
OXYGEN SATURATION: 98 % | WEIGHT: 205.69 LBS | DIASTOLIC BLOOD PRESSURE: 76 MMHG | SYSTOLIC BLOOD PRESSURE: 115 MMHG | BODY MASS INDEX: 31.17 KG/M2 | HEIGHT: 68 IN | RESPIRATION RATE: 18 BRPM

## 2024-04-08 DIAGNOSIS — Z01.811 PRE-OP CHEST EXAM: ICD-10-CM

## 2024-04-08 DIAGNOSIS — Z01.811 PRE-OP CHEST EXAM: Primary | ICD-10-CM

## 2024-04-08 DIAGNOSIS — Z01.818 PREOP TESTING: Primary | ICD-10-CM

## 2024-04-08 RX ORDER — CEFAZOLIN SODIUM 2 G/50ML
2 SOLUTION INTRAVENOUS ONCE
Status: CANCELLED | OUTPATIENT
Start: 2024-04-09

## 2024-04-08 NOTE — DISCHARGE INSTRUCTIONS
INSTRUCTIONS  To confirm your doctor has scheduled your surgery for:4/9/24        Morning of surgery please check in with registration near [facility] Entrance then proceed to Outpatient Surgery Department.    Preop nurses will call the afternoon prior to surgery between 4:00 and 6:00 PM with your final arrival time.  PLEASE NOTE:  The surgery schedule has many variables which may affect the time of your surgery case. Family members should be available if your surgery time changes. Plan to be here the day of your procedure between 4-6 hours.    TAKE ONLY THESE MEDICATIONS WITH A SMALL SIP OF WATER THE MORNING OF SURGERY: see list    DO NOT TAKE THESE MEDICATIONS 5-7 DAYS PRIOR to your procedure per your surgeon's request: ASPIRIN, ALEVE, BC powder, BRIANA SELTZER, IBUPROFEN, FISH OIL, VITAMIN E, OR HERBALS   (May take Tylenol)    If you are prescribed any types of blood thinners (Aspirin, Coumadin, Plavix, Pradaxa, Xarelto, Aggrenox, Effient, Eliquis, Savasya, Brilinta or any other), please ask your surgeon how many days before scheduled procedure should you stop taking them. You may also need to verify with prescribing physician if it is OK to stop your blood thinners.      INSTRUCTIONS IMPORTANT!!  Do not eat or drink anything after midnight.  Do not smoke, vape or drink alcoholic beverages 24 hours prior to your procedure.  Shower the night before AND the morning of your procedure with a Chlorhexidine wash such as hibiclens or Dial antibacterial soap from neck down. You may use your own shampoo and face wash. This helps your skin to be as bacteria free as possible.  If you wear contact lenses, dentures, hearing aids or glasses, bring a container to put them in and give to a family member.    Please leave all jewelry, piercings and valuables at home.  DO NOT remove hair from the surgery site.   If your condition changes such as fever, cough, etc, please notify your surgeon.   ONLY if you have been diagnosed with  sleep apnea please bring your C-PAP machine.  Make arrangements in advance for transportation home by a responsible adult.  You must make arrangements for transportation, TAXI'S, UBER'S OR LYFTS ARE NOT ALLOWED.        If you have any questions about these instructions, call Pre-Op Admit  Nursing at [phone] or the Pre-Op Day Surgery Unit at [phone].

## 2024-04-09 ENCOUNTER — HOSPITAL ENCOUNTER (OUTPATIENT)
Facility: HOSPITAL | Age: 64
Discharge: HOME | End: 2024-04-09
Attending: PLASTIC SURGERY | Admitting: PLASTIC SURGERY
Payer: COMMERCIAL

## 2024-04-09 ENCOUNTER — ANESTHESIA EVENT (OUTPATIENT)
Dept: SURGERY | Facility: HOSPITAL | Age: 64
End: 2024-04-09

## 2024-04-09 ENCOUNTER — ANESTHESIA (OUTPATIENT)
Dept: SURGERY | Facility: HOSPITAL | Age: 64
End: 2024-04-09
Payer: COMMERCIAL

## 2024-04-09 VITALS
RESPIRATION RATE: 16 BRPM | HEIGHT: 68 IN | BODY MASS INDEX: 31.17 KG/M2 | WEIGHT: 205.69 LBS | TEMPERATURE: 98 F | OXYGEN SATURATION: 100 % | SYSTOLIC BLOOD PRESSURE: 145 MMHG | HEART RATE: 73 BPM | DIASTOLIC BLOOD PRESSURE: 78 MMHG

## 2024-04-09 DIAGNOSIS — Z01.818 PREOP TESTING: ICD-10-CM

## 2024-04-09 DIAGNOSIS — Z85.3 HX OF BREAST CANCER: Primary | ICD-10-CM

## 2024-04-09 PROCEDURE — D9220A PRA ANESTHESIA: Mod: CRNA,,, | Performed by: NURSE ANESTHETIST, CERTIFIED REGISTERED

## 2024-04-09 PROCEDURE — 27201423 OPTIME MED/SURG SUP & DEVICES STERILE SUPPLY: Performed by: PLASTIC SURGERY

## 2024-04-09 PROCEDURE — 37000009 HC ANESTHESIA EA ADD 15 MINS: Performed by: PLASTIC SURGERY

## 2024-04-09 PROCEDURE — 63600175 PHARM REV CODE 636 W HCPCS: Performed by: NURSE ANESTHETIST, CERTIFIED REGISTERED

## 2024-04-09 PROCEDURE — 63600175 PHARM REV CODE 636 W HCPCS: Performed by: ANESTHESIOLOGY

## 2024-04-09 PROCEDURE — 71000015 HC POSTOP RECOV 1ST HR: Performed by: PLASTIC SURGERY

## 2024-04-09 PROCEDURE — 71000033 HC RECOVERY, INTIAL HOUR: Performed by: PLASTIC SURGERY

## 2024-04-09 PROCEDURE — 25000003 PHARM REV CODE 250: Performed by: PLASTIC SURGERY

## 2024-04-09 PROCEDURE — 63600175 PHARM REV CODE 636 W HCPCS: Performed by: PLASTIC SURGERY

## 2024-04-09 PROCEDURE — D9220A PRA ANESTHESIA: Mod: ANES,,, | Performed by: ANESTHESIOLOGY

## 2024-04-09 PROCEDURE — 37000008 HC ANESTHESIA 1ST 15 MINUTES: Performed by: PLASTIC SURGERY

## 2024-04-09 PROCEDURE — 36000707: Performed by: PLASTIC SURGERY

## 2024-04-09 PROCEDURE — 36000706: Performed by: PLASTIC SURGERY

## 2024-04-09 PROCEDURE — 25000003 PHARM REV CODE 250: Performed by: ANESTHESIOLOGY

## 2024-04-09 PROCEDURE — 25000003 PHARM REV CODE 250: Performed by: NURSE ANESTHETIST, CERTIFIED REGISTERED

## 2024-04-09 PROCEDURE — 71000039 HC RECOVERY, EACH ADD'L HOUR: Performed by: PLASTIC SURGERY

## 2024-04-09 RX ORDER — MIDAZOLAM HYDROCHLORIDE 1 MG/ML
INJECTION, SOLUTION INTRAMUSCULAR; INTRAVENOUS
Status: DISCONTINUED | OUTPATIENT
Start: 2024-04-09 | End: 2024-04-09

## 2024-04-09 RX ORDER — KETAMINE HYDROCHLORIDE 50 MG/ML
INJECTION, SOLUTION INTRAMUSCULAR; INTRAVENOUS
Status: DISCONTINUED | OUTPATIENT
Start: 2024-04-09 | End: 2024-04-09

## 2024-04-09 RX ORDER — OXYCODONE HYDROCHLORIDE 5 MG/1
5 TABLET ORAL
Status: DISCONTINUED | OUTPATIENT
Start: 2024-04-09 | End: 2024-04-09 | Stop reason: HOSPADM

## 2024-04-09 RX ORDER — EPINEPHRINE 1 MG/ML
INJECTION, SOLUTION, CONCENTRATE INTRAVENOUS
Status: DISCONTINUED | OUTPATIENT
Start: 2024-04-09 | End: 2024-04-09 | Stop reason: HOSPADM

## 2024-04-09 RX ORDER — ROCURONIUM BROMIDE 10 MG/ML
INJECTION, SOLUTION INTRAVENOUS
Status: DISCONTINUED | OUTPATIENT
Start: 2024-04-09 | End: 2024-04-09

## 2024-04-09 RX ORDER — LIDOCAINE HYDROCHLORIDE 20 MG/ML
INJECTION INTRAVENOUS
Status: DISCONTINUED | OUTPATIENT
Start: 2024-04-09 | End: 2024-04-09

## 2024-04-09 RX ORDER — FENTANYL CITRATE 50 UG/ML
INJECTION, SOLUTION INTRAMUSCULAR; INTRAVENOUS
Status: DISCONTINUED | OUTPATIENT
Start: 2024-04-09 | End: 2024-04-09

## 2024-04-09 RX ORDER — ONDANSETRON HYDROCHLORIDE 2 MG/ML
4 INJECTION, SOLUTION INTRAVENOUS DAILY PRN
Status: DISCONTINUED | OUTPATIENT
Start: 2024-04-09 | End: 2024-04-09 | Stop reason: HOSPADM

## 2024-04-09 RX ORDER — CEFAZOLIN SODIUM 2 G/50ML
2 SOLUTION INTRAVENOUS ONCE
Status: COMPLETED | OUTPATIENT
Start: 2024-04-09 | End: 2024-04-09

## 2024-04-09 RX ORDER — VECURONIUM BROMIDE FOR INJECTION 1 MG/ML
INJECTION, POWDER, LYOPHILIZED, FOR SOLUTION INTRAVENOUS
Status: DISCONTINUED | OUTPATIENT
Start: 2024-04-09 | End: 2024-04-09

## 2024-04-09 RX ORDER — ONDANSETRON HYDROCHLORIDE 2 MG/ML
INJECTION, SOLUTION INTRAVENOUS
Status: DISCONTINUED | OUTPATIENT
Start: 2024-04-09 | End: 2024-04-09

## 2024-04-09 RX ORDER — DEXAMETHASONE SODIUM PHOSPHATE 4 MG/ML
INJECTION, SOLUTION INTRA-ARTICULAR; INTRALESIONAL; INTRAMUSCULAR; INTRAVENOUS; SOFT TISSUE
Status: DISCONTINUED | OUTPATIENT
Start: 2024-04-09 | End: 2024-04-09

## 2024-04-09 RX ORDER — DIPHENHYDRAMINE HYDROCHLORIDE 50 MG/ML
12.5 INJECTION INTRAMUSCULAR; INTRAVENOUS
Status: DISCONTINUED | OUTPATIENT
Start: 2024-04-09 | End: 2024-04-09 | Stop reason: HOSPADM

## 2024-04-09 RX ORDER — FAMOTIDINE 10 MG/ML
INJECTION INTRAVENOUS
Status: DISCONTINUED | OUTPATIENT
Start: 2024-04-09 | End: 2024-04-09

## 2024-04-09 RX ORDER — DIPHENHYDRAMINE HYDROCHLORIDE 50 MG/ML
INJECTION INTRAMUSCULAR; INTRAVENOUS
Status: DISCONTINUED | OUTPATIENT
Start: 2024-04-09 | End: 2024-04-09

## 2024-04-09 RX ORDER — MEPERIDINE HYDROCHLORIDE 50 MG/ML
12.5 INJECTION INTRAMUSCULAR; INTRAVENOUS; SUBCUTANEOUS EVERY 10 MIN PRN
Status: DISCONTINUED | OUTPATIENT
Start: 2024-04-09 | End: 2024-04-09 | Stop reason: HOSPADM

## 2024-04-09 RX ORDER — ACETAMINOPHEN 10 MG/ML
INJECTION, SOLUTION INTRAVENOUS
Status: DISCONTINUED | OUTPATIENT
Start: 2024-04-09 | End: 2024-04-09

## 2024-04-09 RX ORDER — HYDROMORPHONE HYDROCHLORIDE 1 MG/ML
0.2 INJECTION, SOLUTION INTRAMUSCULAR; INTRAVENOUS; SUBCUTANEOUS EVERY 5 MIN PRN
Status: DISCONTINUED | OUTPATIENT
Start: 2024-04-09 | End: 2024-04-09 | Stop reason: HOSPADM

## 2024-04-09 RX ORDER — PROPOFOL 10 MG/ML
VIAL (ML) INTRAVENOUS
Status: DISCONTINUED | OUTPATIENT
Start: 2024-04-09 | End: 2024-04-09

## 2024-04-09 RX ORDER — DEXMEDETOMIDINE HYDROCHLORIDE 100 UG/ML
INJECTION, SOLUTION INTRAVENOUS
Status: DISCONTINUED | OUTPATIENT
Start: 2024-04-09 | End: 2024-04-09

## 2024-04-09 RX ADMIN — KETAMINE HYDROCHLORIDE 25 MG: 50 INJECTION INTRAMUSCULAR; INTRAVENOUS at 01:04

## 2024-04-09 RX ADMIN — ONDANSETRON 4 MG: 2 INJECTION INTRAMUSCULAR; INTRAVENOUS at 05:04

## 2024-04-09 RX ADMIN — MIDAZOLAM 2 MG: 1 INJECTION INTRAMUSCULAR; INTRAVENOUS at 12:04

## 2024-04-09 RX ADMIN — CEFAZOLIN SODIUM 2 G: 2 SOLUTION INTRAVENOUS at 01:04

## 2024-04-09 RX ADMIN — SODIUM CHLORIDE, SODIUM LACTATE, POTASSIUM CHLORIDE, AND CALCIUM CHLORIDE: .6; .31; .03; .02 INJECTION, SOLUTION INTRAVENOUS at 02:04

## 2024-04-09 RX ADMIN — SODIUM CHLORIDE, SODIUM LACTATE, POTASSIUM CHLORIDE, AND CALCIUM CHLORIDE: .6; .31; .03; .02 INJECTION, SOLUTION INTRAVENOUS at 12:04

## 2024-04-09 RX ADMIN — DEXAMETHASONE SODIUM PHOSPHATE 8 MG: 4 INJECTION, SOLUTION INTRAMUSCULAR; INTRAVENOUS at 01:04

## 2024-04-09 RX ADMIN — ONDANSETRON 4 MG: 2 INJECTION INTRAMUSCULAR; INTRAVENOUS at 01:04

## 2024-04-09 RX ADMIN — DEXMEDETOMIDINE HYDROCHLORIDE 10 MCG: 100 INJECTION, SOLUTION INTRAVENOUS at 03:04

## 2024-04-09 RX ADMIN — DEXMEDETOMIDINE HYDROCHLORIDE 14 MCG: 100 INJECTION, SOLUTION INTRAVENOUS at 01:04

## 2024-04-09 RX ADMIN — FAMOTIDINE 20 MG: 10 INJECTION, SOLUTION INTRAVENOUS at 01:04

## 2024-04-09 RX ADMIN — OXYCODONE HYDROCHLORIDE 5 MG: 5 TABLET ORAL at 04:04

## 2024-04-09 RX ADMIN — PROPOFOL 200 MG: 10 INJECTION, EMULSION INTRAVENOUS at 01:04

## 2024-04-09 RX ADMIN — DEXMEDETOMIDINE HYDROCHLORIDE 10 MCG: 100 INJECTION, SOLUTION INTRAVENOUS at 02:04

## 2024-04-09 RX ADMIN — SUGAMMADEX 200 MG: 100 INJECTION, SOLUTION INTRAVENOUS at 04:04

## 2024-04-09 RX ADMIN — HYDROMORPHONE HYDROCHLORIDE 0.2 MG: 1 INJECTION, SOLUTION INTRAMUSCULAR; INTRAVENOUS; SUBCUTANEOUS at 05:04

## 2024-04-09 RX ADMIN — DIPHENHYDRAMINE HYDROCHLORIDE 12.5 MG: 50 INJECTION INTRAMUSCULAR; INTRAVENOUS at 01:04

## 2024-04-09 RX ADMIN — ACETAMINOPHEN 1000 MG: 10 INJECTION, SOLUTION INTRAVENOUS at 01:04

## 2024-04-09 RX ADMIN — GLYCOPYRROLATE 0.2 MG: 0.2 INJECTION, SOLUTION INTRAMUSCULAR; INTRAVITREAL at 01:04

## 2024-04-09 RX ADMIN — LIDOCAINE HYDROCHLORIDE 100 MG: 20 INJECTION, SOLUTION INTRAVENOUS at 01:04

## 2024-04-09 RX ADMIN — HYDROMORPHONE HYDROCHLORIDE 0.2 MG: 1 INJECTION, SOLUTION INTRAMUSCULAR; INTRAVENOUS; SUBCUTANEOUS at 04:04

## 2024-04-09 RX ADMIN — ROCURONIUM BROMIDE 30 MG: 10 INJECTION, SOLUTION INTRAVENOUS at 01:04

## 2024-04-09 RX ADMIN — FENTANYL CITRATE 100 MCG: 50 INJECTION INTRAMUSCULAR; INTRAVENOUS at 01:04

## 2024-04-09 RX ADMIN — VECURONIUM BROMIDE 2 MG: 1 INJECTION, POWDER, LYOPHILIZED, FOR SOLUTION INTRAVENOUS at 02:04

## 2024-04-09 RX ADMIN — ROCURONIUM BROMIDE 20 MG: 10 INJECTION, SOLUTION INTRAVENOUS at 01:04

## 2024-04-09 RX ADMIN — VECURONIUM BROMIDE 1 MG: 1 INJECTION, POWDER, LYOPHILIZED, FOR SOLUTION INTRAVENOUS at 03:04

## 2024-04-09 RX ADMIN — SODIUM CHLORIDE, SODIUM LACTATE, POTASSIUM CHLORIDE, AND CALCIUM CHLORIDE: .6; .31; .03; .02 INJECTION, SOLUTION INTRAVENOUS at 04:04

## 2024-04-09 NOTE — ANESTHESIA PROCEDURE NOTES
Intubation    Date/Time: 4/9/2024 1:05 PM    Performed by: Natalia Echeverria CRNA  Authorized by: Donta Urrutia MD    Intubation:     Induction:  Intravenous    Intubated:  Postinduction    Mask Ventilation:  Easy mask    Attempts:  1    Attempted By:  CRNA    Method of Intubation:  Video laryngoscopy    Blade:  Coleman 3    Laryngeal View Grade: Grade I - full view of cords      Difficult Airway Encountered?: No      Complications:  None    Airway Device:  Oral endotracheal tube    Airway Device Size:  7.0    Style/Cuff Inflation:  Cuffed    Inflation Amount (mL):  8    Tube secured:  21    Secured at:  The teeth    Placement Verified By:  Capnometry    Complicating Factors:  None    Findings Post-Intubation:  BS equal bilateral and atraumatic/condition of teeth unchanged

## 2024-04-09 NOTE — ANESTHESIA POSTPROCEDURE EVALUATION
Anesthesia Post Evaluation    Patient: Paula Hernadez    Procedure(s) Performed: Procedure(s) (LRB):  MASTOPEXY (Bilateral)  LIPOSUCTION, WITH FAT TRANSFER (Bilateral)    Final Anesthesia Type: general      Patient location during evaluation: PACU  Patient participation: Yes- Able to Participate  Level of consciousness: awake and alert  Post-procedure vital signs: reviewed and stable  Pain management: adequate  Airway patency: patent    PONV status at discharge: No PONV  Anesthetic complications: no      Cardiovascular status: stable  Respiratory status: unassisted and spontaneous ventilation  Hydration status: euvolemic  Follow-up not needed.              Vitals Value Taken Time   /91 04/09/24 1731   Temp 36.9 °C (98.4 °F) 04/09/24 1715   Pulse 75 04/09/24 1731   Resp 14 04/09/24 1731   SpO2 100 % 04/09/24 1731   Vitals shown include unvalidated device data.      Event Time   Out of Recovery 17:36:01         Pain/Krish Score: Pain Rating Prior to Med Admin: 5 (4/9/2024  5:17 PM)  Krish Score: 9 (4/9/2024  5:30 PM)

## 2024-04-09 NOTE — DISCHARGE SUMMARY
[facility]  Discharge Note  Short Stay    Procedure(s) (LRB):Patient tolerated treatment/procedure well without complication and is now ready for discharge.  MASTOPEXY (Bilateral)  LIPOSUCTION, WITH FAT TRANSFER (Bilateral)      OUTCOME: Patient tolerated treatment/procedure well without complication and is now ready for discharge.    DISPOSITION: Home or Self Care    FINAL DIAGNOSIS:  Hx of breast cancer    FOLLOWUP: In clinic    DISCHARGE INSTRUCTIONS:    Discharge Procedure Orders   Change dressing (specify)   Order Comments: Keep all incisions padded and wear surgical bra.     Activity as tolerated     Shower on day dressing removed (No bath)   Order Comments: Okay to shower in 24 hours. Keep steri strips in place.        TIME SPENT ON DISCHARGE: 20 minutes

## 2024-04-09 NOTE — DISCHARGE INSTRUCTIONS
Call MD for severe bleeding, temp greater than 100.6, redness, swelling, foul drainage or odor, persistent nausea/vomiting, severe pain not relieved by pain medication.  Do not take pain medicine on an empty stomach.  No driving, operating machinery or signing legal documents for 24 hours, or while taking pain medicine.  You are at risk for falling for 24 hours due to anesthesia/sedation/pain medications received today.  Rest for the remainder of the day and make sure to wear nonskid socks or shoes when ambulating.  Use assistive devices to ambulate if applicable.

## 2024-04-09 NOTE — ANESTHESIA PREPROCEDURE EVALUATION
04/09/2024  Paula Hernadez is a 64 y.o., female.      Patient Active Problem List   Diagnosis    Allergic rhinitis    Insomnia, persistent    Neuropathy    Basal cell papilloma    Arthritis of carpometacarpal (CMC) joint of left thumb    DCIS-Left Breast-ER/ID pos    Obesity (BMI 30.0-34.9)       Past Surgical History:   Procedure Laterality Date    ABDOMINOPLASTY      BREAST BIOPSY      BREAST MASS EXCISION      COSMETIC SURGERY      tummy tuck    CYST REMOVAL  05/27/2021    chest    INTERPOSITION ARTHROPLASTY OF CARPOMETACARPAL JOINTS Left 06/09/2021    Procedure: INTERPOSITION ARTHROPLASTY, CMC JOINT;  Surgeon: Giovani Molina MD;  Location: [facility] OR;  Service: Orthopedics;  Laterality: Left;    MASTECTOMY, PARTIAL Left 10/19/2021    Procedure: MASTECTOMY, PARTIAL;  Surgeon: Lila Perales MD;  Location: [facility] OR;  Service: General;  Laterality: Left;    NEEDLE LOCALIZATION Left 10/19/2021    Procedure: NEEDLE LOCALIZATION;  Surgeon: Lila Perales MD;  Location: [facility] OR;  Service: General;  Laterality: Left;  LOCALIZER DONE 10/15    SHOULDER SURGERY Left     TONSILLECTOMY          Tobacco Use:  The patient  reports that she quit smoking about 21 years ago. Her smoking use included cigarettes. She started smoking about 49 years ago. She has a 6.9 pack-year smoking history. She has never used smokeless tobacco.     Results for orders placed or performed in visit on 02/14/24   IN OFFICE EKG 12-LEAD (to [place])    Collection Time: 02/14/24 10:54 AM   Result Value Ref Range    QRS Duration 86 ms    [facility] QTC Calculation 439 ms    Narrative    Test Reason : R06.09,R07.89,R00.2,    Vent. Rate : 077 BPM     Atrial Rate : 077 BPM     P-R Int : 158 ms          QRS Dur : 086 ms      QT Int : 388 ms       P-R-T Axes : 071 022 071 degrees     QTc Int : 439 ms    Normal sinus rhythm  Normal ECG  When compared  with ECG of 27-MAY-2021 13:24,  No significant change was found  Confirmed by Alan Nicholas MD ([id number]) on 2/14/2024 12:47:28 PM    Referred By: Harpreet Gonsalez           Confirmed By:Alan Nicholas MD             Lab Results   Component Value Date    WBC 4.11 02/14/2024    HGB 13.1 02/14/2024    HCT 40.3 02/14/2024    MCV 90 02/14/2024     02/14/2024     BMP  Lab Results   Component Value Date     02/14/2024    K 4.3 02/14/2024     02/14/2024    CO2 25 02/14/2024    BUN 13 02/14/2024    CREATININE 0.8 02/14/2024    CALCIUM 9.3 02/14/2024    ANIONGAP 9 02/14/2024    GLU 84 02/14/2024    GLU 94 10/19/2023    GLU 87 12/14/2022       No results found for this or any previous visit.    Conclusion         Normal myocardial perfusion scan. There is no evidence of myocardial ischemia or infarction.    The gated perfusion images showed an ejection fraction of 66% at rest. The gated perfusion images showed an ejection fraction of 63% post stress. Normal ejection fraction is greater than 53%.    There is normal wall motion at rest and post stress.    LV cavity size is normal at rest and normal at stress.    The ECG portion of the study is negative for ischemia.    The patient reported no chest pain during the stress test.    There were no arrhythmias during stress.    The patient exercised for 4 minutes 12 seconds on a Regan protocol, corresponding to a functional capacity of 6 METS, achieving a peak heart rate of 146 bpm, which is 98 % of the age predicted maximum heart rate.          Pre-op Assessment    I have reviewed the Patient Summary Reports.     I have reviewed the Nursing Notes. I have reviewed the NPO Status.   I have reviewed the Medications.     Review of Systems  Anesthesia Hx:  No problems with previous Anesthesia             Denies Family Hx of Anesthesia complications.    Denies Personal Hx of Anesthesia complications.                    Social:  Former Smoker        Hematology/Oncology:  Hematology Normal                       --  Cancer in past history:       Breast    left   surgery       EENT/Dental:  chronic allergic rhinitis           Cardiovascular:                 MCGOWAN (recent episode of MCGOWAN and chest tightness, nuclear stress test negative)                            Pulmonary:  Pulmonary Normal                       Hepatic/GI:     GERD, well controlled             Musculoskeletal:  Arthritis               Neurological:        Peripheral Neuropathy                          Endocrine:  Endocrine Normal                Physical Exam  General: Well nourished, Cooperative, Alert and Oriented    Airway:  Mallampati: III / II  Mouth Opening: Normal  TM Distance: Normal  Tongue: Normal  Neck ROM: Normal ROM    Chest/Lungs:  Clear to auscultation    Heart:  Rate: Normal  Rhythm: Regular Rhythm  Sounds: Normal    Abdomen:  Normal, Soft, Nontender      Anesthesia Plan  Type of Anesthesia, risks & benefits discussed:    Anesthesia Type: Gen ETT  Intra-op Monitoring Plan: Standard ASA Monitors  Post Op Pain Control Plan: multimodal analgesia and IV/PO Opioids PRN  Induction:  IV  Airway Plan: Video, Post-Induction  Informed Consent: Informed consent signed with the Patient and all parties understand the risks and agree with anesthesia plan.  All questions answered.   ASA Score: 3  Anesthesia Plan Notes:   GETA  IV tylenol, Precedex  Zofran, Benadryl 6.25 mg, Pepcid    Ready For Surgery From Anesthesia Perspective.     .

## 2024-04-16 ENCOUNTER — LAB VISIT (OUTPATIENT)
Dept: LAB | Facility: HOSPITAL | Age: 64
End: 2024-04-16
Attending: INTERNAL MEDICINE
Payer: COMMERCIAL

## 2024-04-16 ENCOUNTER — TELEPHONE (OUTPATIENT)
Dept: HEMATOLOGY/ONCOLOGY | Facility: CLINIC | Age: 64
End: 2024-04-16

## 2024-04-16 DIAGNOSIS — D05.12 DUCTAL CARCINOMA IN SITU (DCIS) OF LEFT BREAST: ICD-10-CM

## 2024-04-16 DIAGNOSIS — D05.12 DUCTAL CARCINOMA IN SITU (DCIS) OF LEFT BREAST: Primary | ICD-10-CM

## 2024-04-16 LAB
ALBUMIN SERPL BCP-MCNC: 3.6 G/DL (ref 3.5–5.2)
ALP SERPL-CCNC: 93 U/L (ref 55–135)
ALT SERPL W/O P-5'-P-CCNC: 19 U/L (ref 10–44)
ANION GAP SERPL CALC-SCNC: 8 MMOL/L (ref 8–16)
AST SERPL-CCNC: 19 U/L (ref 10–40)
BASOPHILS # BLD AUTO: 0.03 K/UL (ref 0–0.2)
BASOPHILS NFR BLD: 0.7 % (ref 0–1.9)
BILIRUB SERPL-MCNC: 0.9 MG/DL (ref 0.1–1)
BUN SERPL-MCNC: 15 MG/DL (ref 8–23)
CALCIUM SERPL-MCNC: 8.9 MG/DL (ref 8.7–10.5)
CHLORIDE SERPL-SCNC: 101 MMOL/L (ref 95–110)
CO2 SERPL-SCNC: 26 MMOL/L (ref 23–29)
CREAT SERPL-MCNC: 1 MG/DL (ref 0.5–1.4)
DIFFERENTIAL METHOD BLD: ABNORMAL
EOSINOPHIL # BLD AUTO: 0.2 K/UL (ref 0–0.5)
EOSINOPHIL NFR BLD: 5 % (ref 0–8)
ERYTHROCYTE [DISTWIDTH] IN BLOOD BY AUTOMATED COUNT: 15.2 % (ref 11.5–14.5)
EST. GFR  (NO RACE VARIABLE): >60 ML/MIN/1.73 M^2
GLUCOSE SERPL-MCNC: 100 MG/DL (ref 70–110)
HCT VFR BLD AUTO: 33.5 % (ref 37–48.5)
HGB BLD-MCNC: 10.8 G/DL (ref 12–16)
IMM GRANULOCYTES # BLD AUTO: 0.02 K/UL (ref 0–0.04)
IMM GRANULOCYTES NFR BLD AUTO: 0.5 % (ref 0–0.5)
LYMPHOCYTES # BLD AUTO: 1 K/UL (ref 1–4.8)
LYMPHOCYTES NFR BLD: 22.7 % (ref 18–48)
MCH RBC QN AUTO: 29.4 PG (ref 27–31)
MCHC RBC AUTO-ENTMCNC: 32.2 G/DL (ref 32–36)
MCV RBC AUTO: 91 FL (ref 82–98)
MONOCYTES # BLD AUTO: 0.4 K/UL (ref 0.3–1)
MONOCYTES NFR BLD: 9.3 % (ref 4–15)
NEUTROPHILS # BLD AUTO: 2.6 K/UL (ref 1.8–7.7)
NEUTROPHILS NFR BLD: 61.8 % (ref 38–73)
NRBC BLD-RTO: 0 /100 WBC
PLATELET # BLD AUTO: 284 K/UL (ref 150–450)
PMV BLD AUTO: 8.6 FL (ref 9.2–12.9)
POTASSIUM SERPL-SCNC: 4.1 MMOL/L (ref 3.5–5.1)
PROT SERPL-MCNC: 6.6 G/DL (ref 6–8.4)
RBC # BLD AUTO: 3.67 M/UL (ref 4–5.4)
SODIUM SERPL-SCNC: 135 MMOL/L (ref 136–145)
WBC # BLD AUTO: 4.19 K/UL (ref 3.9–12.7)

## 2024-04-16 PROCEDURE — 85025 COMPLETE CBC W/AUTO DIFF WBC: CPT | Performed by: INTERNAL MEDICINE

## 2024-04-16 PROCEDURE — 36415 COLL VENOUS BLD VENIPUNCTURE: CPT | Performed by: INTERNAL MEDICINE

## 2024-04-16 PROCEDURE — 80053 COMPREHEN METABOLIC PANEL: CPT | Performed by: INTERNAL MEDICINE

## 2024-04-16 NOTE — TELEPHONE ENCOUNTER
----- Message from Chata Rolle sent at 4/16/2024  9:10 AM CDT -----  Pt would like a call back to r/s her appt to 04/22 in the am.     Please let her know if she needs labs prior to her visit, she will need orders     [phone]

## 2024-04-19 NOTE — PROGRESS NOTES
PROGRESS NOTE    Subjective:       Patient ID: Paula Hernadez is a 64 y.o. female.    9/29/2021  Dx Mammo:  microcalc in left breast at 4-5 o'clock area.     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     1/19/2022  Radiation complete    2/9/2022  Arimidex started.     1/19/2022:  Dexa: Normal    6/20/2022:  Bilateral mammogram negative  Recheck left breast in 6 months(surgical changes)    1/11/2023:  Left breast microcal biopsy:  BREAST, LEFT, MICROCALCIFICATIONS, STEREOTACTIC GUIDED CORE BIOPSY   - FAT NECROSIS AND FIBROSIS WITH ASSOCIATED MICROCALCIFICATIONS     Chief Complaint:  DCIS follow up    History of Present Illness:   Paula Hernadez is a 64 y.o. female who presents for follow up of above.      Ms. Hernadez is doing well today.  She has a breast lift 2 weeks ago. Feeling increased fatigue hgb down to 10.8.     She remains on Arimidex as of today, 4/22/2024      Family and Social history reviewed and is unchanged from 11/17/2021      ROS:  Review of Systems   Constitutional: Negative for fever.   Respiratory: Negative for shortness of breath.    Cardiovascular: Negative for chest pain and leg swelling.   Gastrointestinal: Negative for abdominal pain and blood in stool.   Genitourinary: Negative for hematuria.   Skin: Negative for rash.          Current Outpatient Medications:     acetaminophen (TYLENOL) 500 MG tablet, Take 1,000 mg by mouth every 6 (six) hours as needed for Pain., Disp: , Rfl:     anastrozole (ARIMIDEX) 1 mg Tab, Take 1 tablet (1 mg total) by mouth once daily., Disp: 90 tablet, Rfl: 3    cyclobenzaprine (FLEXERIL) 5 MG tablet, Take 5 mg by mouth nightly. 1-2 daily, Disp: , Rfl:     docusate sodium (STOOL SOFTENER) 100 mg capsule, Take 100 mg by mouth 2 (two) times daily., Disp: , Rfl:     omeprazole (PRILOSEC) 20 MG capsule, Take 20 mg  by mouth once daily. , Disp: , Rfl:     temazepam (RESTORIL) 15 mg Cap, Take 1 capsule (15 mg total) by mouth nightly as needed (insomnia)., Disp: 30 capsule, Rfl: 1    venlafaxine (EFFEXOR-XR) 37.5 MG 24 hr capsule, Take 1 capsule (37.5 mg total) by mouth once daily., Disp: 90 capsule, Rfl: 3        Objective:       Physical Examination:     BP (!) 121/59   Pulse 94   Temp 98.2 °F (36.8 °C)   Resp 18   LMP 01/01/2012 Comment: menopause    Physical Exam  Constitutional:       Appearance: She is well-developed and well-nourished.   HENT:      Head: Normocephalic and atraumatic.      Right Ear: External ear normal.      Left Ear: External ear normal.      Mouth/Throat:      Mouth: Oropharynx is clear and moist.   Eyes:      Conjunctiva/sclera: Conjunctivae normal.      Pupils: Pupils are equal, round, and reactive to light.   Neck:      Thyroid: No thyromegaly.      Trachea: No tracheal deviation.   Cardiovascular:      Rate and Rhythm: Normal rate and regular rhythm.      Heart sounds: Normal heart sounds.   Pulmonary:      Effort: Pulmonary effort is normal.      Breath sounds: Normal breath sounds.   Abdominal:      General: Bowel sounds are normal. There is no distension.      Palpations: Abdomen is soft. There is no mass.      Tenderness: There is no abdominal tenderness.   Musculoskeletal:         General: No edema.   Skin:     Findings: No rash.   Neurological:      Comments: Neuro intact througout   Psychiatric:         Mood and Affect: Mood and affect normal.         Behavior: Behavior normal.         Thought Content: Thought content normal.         Judgment: Judgment normal.    BREAST:Breast exam is negative bilaterally.  No masses, no rash, no skin changes. No adenopathy is present in the cervical, supraclavicular, infraclavicular or axillary regions.  No palpable lesion in area of left breast concern           Labs:   Recent Results (from the past 336 hour(s))   CBC auto differential    Collection  Time: 04/16/24  2:33 PM   Result Value Ref Range    WBC 4.19 3.90 - 12.70 K/uL    Hemoglobin 10.8 (L) 12.0 - 16.0 g/dL    Hematocrit 33.5 (L) 37.0 - 48.5 %    Platelets 284 150 - 450 K/uL       CMP  Sodium   Date Value Ref Range Status   04/16/2024 135 (L) 136 - 145 mmol/L Final     Potassium   Date Value Ref Range Status   04/16/2024 4.1 3.5 - 5.1 mmol/L Final     Chloride   Date Value Ref Range Status   04/16/2024 101 95 - 110 mmol/L Final     CO2   Date Value Ref Range Status   04/16/2024 26 23 - 29 mmol/L Final     Glucose   Date Value Ref Range Status   04/16/2024 100 70 - 110 mg/dL Final     BUN   Date Value Ref Range Status   04/16/2024 15 8 - 23 mg/dL Final     Creatinine   Date Value Ref Range Status   04/16/2024 1.0 0.5 - 1.4 mg/dL Final     Calcium   Date Value Ref Range Status   04/16/2024 8.9 8.7 - 10.5 mg/dL Final     Total Protein   Date Value Ref Range Status   04/16/2024 6.6 6.0 - 8.4 g/dL Final     Albumin   Date Value Ref Range Status   04/16/2024 3.6 3.5 - 5.2 g/dL Final     Total Bilirubin   Date Value Ref Range Status   04/16/2024 0.9 0.1 - 1.0 mg/dL Final     Comment:     For infants and newborns, interpretation of results should be based  on gestational age, weight and in agreement with clinical  observations.    Premature Infant recommended reference ranges:  Up to 24 hours.............<8.0 mg/dL  Up to 48 hours............<12.0 mg/dL  3-5 days..................<15.0 mg/dL  6-29 days.................<15.0 mg/dL       Alkaline Phosphatase   Date Value Ref Range Status   04/16/2024 93 55 - 135 U/L Final     AST   Date Value Ref Range Status   04/16/2024 19 10 - 40 U/L Final     ALT   Date Value Ref Range Status   04/16/2024 19 10 - 44 U/L Final     Anion Gap   Date Value Ref Range Status   04/16/2024 8 8 - 16 mmol/L Final     eGFR if    Date Value Ref Range Status   06/15/2022 >60.0 >60 mL/min/1.73 m^2 Final     eGFR if non    Date Value Ref Range Status  "  06/15/2022 >60.0 >60 mL/min/1.73 m^2 Final     Comment:     Calculation used to obtain the estimated glomerular filtration  rate (eGFR) is the CKD-EPI equation.        No results found for: "CEA"  No results found for: "PSA"        Assessment/Plan:     Problem List Items Addressed This Visit          Oncology    DCIS-Left Breast-ER/LA pos - Primary    Relevant Orders    DXA Bone Density Axial Skeleton 1 or more sites    Mammo Digital Diagnostic Bilat with Yasmany     Other Visit Diagnoses       Post-menopausal        Relevant Orders    DXA Bone Density Axial Skeleton 1 or more sites    Hot flashes        Relevant Medications    venlafaxine (EFFEXOR-XR) 37.5 MG 24 hr capsule    Anemia, unspecified type        Relevant Orders    CBC Auto Differential    FERRITIN            DCIS- Continue Arimidex; Due Mammogram in Nov 2024  2. Long term use of AI- Due for Dexa scan  3. Postmenopausal- Due for Dexa scan  4.  Hot Flashes- Continue Effexor  5. Anemia- Repeat labs in 4 weeks      Discussion:     Follow up in about 6 months (around 10/22/2024) for with Dr. Patel.      Electronically signed by Aminah Esqueda, MSN, APRN, AGNP-C, OCN                "

## 2024-04-22 ENCOUNTER — OFFICE VISIT (OUTPATIENT)
Dept: HEMATOLOGY/ONCOLOGY | Facility: CLINIC | Age: 64
End: 2024-04-22
Payer: COMMERCIAL

## 2024-04-22 VITALS
HEART RATE: 94 BPM | SYSTOLIC BLOOD PRESSURE: 121 MMHG | RESPIRATION RATE: 18 BRPM | TEMPERATURE: 98 F | DIASTOLIC BLOOD PRESSURE: 59 MMHG

## 2024-04-22 DIAGNOSIS — D05.12 DUCTAL CARCINOMA IN SITU (DCIS) OF LEFT BREAST: Primary | ICD-10-CM

## 2024-04-22 DIAGNOSIS — D64.9 ANEMIA, UNSPECIFIED TYPE: ICD-10-CM

## 2024-04-22 DIAGNOSIS — Z78.0 POST-MENOPAUSAL: ICD-10-CM

## 2024-04-22 DIAGNOSIS — R23.2 HOT FLASHES: ICD-10-CM

## 2024-04-22 PROCEDURE — 1160F RVW MEDS BY RX/DR IN RCRD: CPT | Mod: CPTII,S$GLB,, | Performed by: NURSE PRACTITIONER

## 2024-04-22 PROCEDURE — 1159F MED LIST DOCD IN RCRD: CPT | Mod: CPTII,S$GLB,, | Performed by: NURSE PRACTITIONER

## 2024-04-22 PROCEDURE — 3074F SYST BP LT 130 MM HG: CPT | Mod: CPTII,S$GLB,, | Performed by: NURSE PRACTITIONER

## 2024-04-22 PROCEDURE — 3078F DIAST BP <80 MM HG: CPT | Mod: CPTII,S$GLB,, | Performed by: NURSE PRACTITIONER

## 2024-04-22 PROCEDURE — 99214 OFFICE O/P EST MOD 30 MIN: CPT | Mod: S$GLB,,, | Performed by: NURSE PRACTITIONER

## 2024-04-22 RX ORDER — VENLAFAXINE HYDROCHLORIDE 37.5 MG/1
37.5 CAPSULE, EXTENDED RELEASE ORAL DAILY
Qty: 90 CAPSULE | Refills: 3 | Status: SHIPPED | OUTPATIENT
Start: 2024-04-22 | End: 2025-04-22

## 2024-04-30 NOTE — OP NOTE
Preop Dx:   Personal history of breast cancer  Acquired asymmetry of breasts     Postop Dx:  1.   Personal history of breast cancer  2.   Acquired asymmetry of breasts     Procedure:  Bilateral mastopexies for symmetry  2.   Fat grafting to left breast 290 cc     Surgeon:  Yuniel Cadena MD     Assistant:  Shannon Hernandez PA-C     Indication for procedure: 64F with history of left breast malignancy treated with lumpectomy and radiation, resulting in significant asymmetry requiring mastopexy and fat grafting.      Anesthesia: General endotracheal     Blood Loss: 200 cc     Specimens: none     Drains: none     Complications: none     Procedure in detail: Patient was taken to the operating room and placed supine on the operating table. After anesthesia was induced the patient was prepped and draped in a sterile fashion. We began with infiltration of 3 liters of tumescent solution to the abdomen, flanks, and lateral chest wall in preparation for liposuction and fat harvest.      Next we began on the left breast, using a 38 mm cookie cutter to outline the areolar complex, and incising a wise pattern skin resection and raising the skin flaps down to the chest wall. The lateral aspect of the breast mound was then sutured using 2-0 vicryl in order to help shape the lateral curvature of the breast, and then the breast skin flaps were brought together at the t junction using a vicryl suture. The cookie cutter was then used to outline the new location of the reconstruction nipple, which was then delivered and sutured into place with 3-0 monocryl deep dermal sutures and a 4-0 monocryl running subcuticular. The vertical and horizontal limbs were sutured using a 2-0 monoderm quill running deep dermal and running subcuticular suture.      We then turned our attention to the right breast and repeated the process previosuly performed on the contralateral side. using a 42 mm cookie cutter to outline the areolar complex, and incising a  wise pattern skin resection and raising the skin flaps down to the chest wall. The lateral aspect of the breast mound was then sutured using 2-0 vicryl in order to help shape the lateral curvature of the breast, and then the breast skin flaps were brought together at the t junction using a vicryl suture. The cookie cutter was then used to outline the new location of the reconstruction nipple, which was then delivered and sutured into place with 3-0 monocryl deep dermal sutures and a 4-0 monocryl running subcuticular. The vertical and horizontal limbs were sutured using a 2-0 monoderm quill running deep dermal and running subcuticular suture.      Next, using a 5 mm liposuction cannula and the RevOpposing Views system, fat was harvested from the aforementioned sites until we had adequate volume for grafting. A total of 290 cc of processed fat was grafted into the left breast in order to achieve symmetry of volume and contour. This was done in numerous passes grafting 1-2 cc of fat at a time each in unique vascular planes.     This completed the procedure, the patient was extubated, tolerated the procedure well, there were no complcations.

## 2024-05-08 ENCOUNTER — TELEPHONE (OUTPATIENT)
Dept: HEMATOLOGY/ONCOLOGY | Facility: CLINIC | Age: 64
End: 2024-05-08

## 2024-05-08 ENCOUNTER — HOSPITAL ENCOUNTER (OUTPATIENT)
Dept: RADIOLOGY | Facility: HOSPITAL | Age: 64
Discharge: HOME | End: 2024-05-08
Attending: NURSE PRACTITIONER
Payer: COMMERCIAL

## 2024-05-08 DIAGNOSIS — Z78.0 POST-MENOPAUSAL: ICD-10-CM

## 2024-05-08 DIAGNOSIS — D05.12 DUCTAL CARCINOMA IN SITU (DCIS) OF LEFT BREAST: ICD-10-CM

## 2024-05-08 PROCEDURE — 77080 DXA BONE DENSITY AXIAL: CPT | Mod: 26,,, | Performed by: RADIOLOGY

## 2024-05-08 PROCEDURE — 77080 DXA BONE DENSITY AXIAL: CPT | Mod: TC,PO

## 2024-05-08 NOTE — TELEPHONE ENCOUNTER
----- Message from SAGAR Luna sent at 5/8/2024 11:27 AM CDT -----  Let her know the bone density was normal      Aminah

## 2024-05-22 ENCOUNTER — LAB VISIT (OUTPATIENT)
Dept: LAB | Facility: HOSPITAL | Age: 64
End: 2024-05-22
Attending: NURSE PRACTITIONER
Payer: COMMERCIAL

## 2024-05-22 DIAGNOSIS — D64.9 ANEMIA, UNSPECIFIED TYPE: ICD-10-CM

## 2024-05-22 LAB
BASOPHILS # BLD AUTO: 0.03 K/UL (ref 0–0.2)
BASOPHILS NFR BLD: 0.7 % (ref 0–1.9)
DIFFERENTIAL METHOD BLD: ABNORMAL
EOSINOPHIL # BLD AUTO: 0.3 K/UL (ref 0–0.5)
EOSINOPHIL NFR BLD: 5.7 % (ref 0–8)
ERYTHROCYTE [DISTWIDTH] IN BLOOD BY AUTOMATED COUNT: 14.5 % (ref 11.5–14.5)
FERRITIN SERPL-MCNC: 31.4 NG/ML (ref 20–300)
HCT VFR BLD AUTO: 38.1 % (ref 37–48.5)
HGB BLD-MCNC: 12.1 G/DL (ref 12–16)
IMM GRANULOCYTES # BLD AUTO: 0.01 K/UL (ref 0–0.04)
IMM GRANULOCYTES NFR BLD AUTO: 0.2 % (ref 0–0.5)
LYMPHOCYTES # BLD AUTO: 1.3 K/UL (ref 1–4.8)
LYMPHOCYTES NFR BLD: 30.1 % (ref 18–48)
MCH RBC QN AUTO: 28.4 PG (ref 27–31)
MCHC RBC AUTO-ENTMCNC: 31.8 G/DL (ref 32–36)
MCV RBC AUTO: 89 FL (ref 82–98)
MONOCYTES # BLD AUTO: 0.3 K/UL (ref 0.3–1)
MONOCYTES NFR BLD: 7.8 % (ref 4–15)
NEUTROPHILS # BLD AUTO: 2.4 K/UL (ref 1.8–7.7)
NEUTROPHILS NFR BLD: 55.5 % (ref 38–73)
NRBC BLD-RTO: 0 /100 WBC
PLATELET # BLD AUTO: 250 K/UL (ref 150–450)
PMV BLD AUTO: 8.8 FL (ref 9.2–12.9)
RBC # BLD AUTO: 4.26 M/UL (ref 4–5.4)
WBC # BLD AUTO: 4.35 K/UL (ref 3.9–12.7)

## 2024-05-22 PROCEDURE — 85025 COMPLETE CBC W/AUTO DIFF WBC: CPT | Performed by: NURSE PRACTITIONER

## 2024-05-22 PROCEDURE — 36415 COLL VENOUS BLD VENIPUNCTURE: CPT | Performed by: NURSE PRACTITIONER

## 2024-05-22 PROCEDURE — 82728 ASSAY OF FERRITIN: CPT | Performed by: NURSE PRACTITIONER

## 2024-05-28 NOTE — PROGRESS NOTES
SUBJECTIVE:      Patient ID: Paula Hernadez is a 58 y.o. female.    Chief Complaint: Sore Throat and Cough    Patient c/o sore throat, PND and cough for one week. Has company coming in for the Memorial Day weekend and wanted to be checked out. NO fever.   She also needs paperwork filled out for her work physical. Needs labs      Cough   This is a new problem. The current episode started in the past 7 days. The problem has been unchanged. The cough is non-productive. Associated symptoms include nasal congestion, postnasal drip, rhinorrhea and a sore throat. Pertinent negatives include no chest pain, chills, fever or shortness of breath. The symptoms are aggravated by lying down. She has tried OTC cough suppressant for the symptoms. The treatment provided mild relief.   Sinus Problem   This is a new problem. The current episode started in the past 7 days. The problem is unchanged. There has been no fever. Associated symptoms include congestion, coughing, sinus pressure and a sore throat. Pertinent negatives include no chills, diaphoresis or shortness of breath. Past treatments include oral decongestants. The treatment provided mild relief.       Past Surgical History:   Procedure Laterality Date    COSMETIC SURGERY      SHOULDER SURGERY Left     TONSILLECTOMY       No family history on file.   Social History     Social History    Marital status:      Spouse name: N/A    Number of children: N/A    Years of education: N/A     Social History Main Topics    Smoking status: Former Smoker     Quit date: 10/6/2002    Smokeless tobacco: Never Used    Alcohol use Yes    Drug use: No    Sexual activity: Yes     Other Topics Concern    None     Social History Narrative    None     Current Outpatient Prescriptions   Medication Sig Dispense Refill    omeprazole (PRILOSEC) 20 MG capsule Take 1 capsule (20 mg total) by mouth once daily. 90 capsule 1    azithromycin (Z-KENTRELL) 250 MG tablet Take 1 tablet (250  "mg total) by mouth once daily. po on day 1 then 1 tab po on days 2-5 6 tablet 0    brompheniramine-pseudoeph-DM 2-30-10 mg/5 mL Syrp Take 10 mLs by mouth every 6 (six) hours. 118 mL 0     No current facility-administered medications for this visit.      Review of patient's allergies indicates:   Allergen Reactions    Codeine Nausea Only      Past Medical History:   Diagnosis Date    Chronic insomnia     Neuromuscular disorder     Seasonal allergies     Seborrheic keratoses      Past Surgical History:   Procedure Laterality Date    COSMETIC SURGERY      SHOULDER SURGERY Left     TONSILLECTOMY         Review of Systems   Constitutional: Negative for appetite change, chills, diaphoresis, fever and unexpected weight change.   HENT: Positive for congestion, postnasal drip, rhinorrhea, sinus pressure and sore throat. Negative for ear discharge, hearing loss, trouble swallowing and voice change.    Eyes: Negative for photophobia and pain.   Respiratory: Positive for cough. Negative for chest tightness, shortness of breath and stridor.    Cardiovascular: Negative for chest pain.   Gastrointestinal: Negative for blood in stool and vomiting.   Endocrine: Negative for cold intolerance and heat intolerance.   Genitourinary: Negative for difficulty urinating and flank pain.   Musculoskeletal: Negative for joint swelling and neck stiffness.   Skin: Negative for pallor.   Neurological: Negative for speech difficulty.   Hematological: Does not bruise/bleed easily.   Psychiatric/Behavioral: Negative for confusion.      OBJECTIVE:      Vitals:    05/23/18 1401   BP: 102/62   Pulse: 82   Temp: 98.9 °F (37.2 °C)   TempSrc: Oral   SpO2: 99%   Weight: 98 kg (216 lb)   Height: 5' 9" (1.753 m)     Physical Exam   Constitutional: She is oriented to person, place, and time. She appears well-developed and well-nourished.   HENT:   Head: Atraumatic.   Right Ear: Tympanic membrane normal.   Left Ear: Tympanic membrane normal.   Nose: " Rhinorrhea present. Mucosal edema: turbinates erythematous and swollen.   Mouth/Throat: Uvula is midline. Posterior oropharyngeal erythema present. No tonsillar exudate.   Eyes: Conjunctivae are normal.   Neck: Neck supple.   Cardiovascular: Normal rate, regular rhythm, normal heart sounds and intact distal pulses.    Pulmonary/Chest: Effort normal and breath sounds normal.   Abdominal: Soft. Bowel sounds are normal. She exhibits no distension.   Musculoskeletal: Normal range of motion.   Neurological: She is alert and oriented to person, place, and time.   Skin: Skin is warm and dry.   Psychiatric: She has a normal mood and affect.   Nursing note and vitals reviewed.     Assessment:       1. Rhinosinusitis    2. Preventative health care        Plan:       Rhinosinusitis  -     brompheniramine-pseudoeph-DM 2-30-10 mg/5 mL Syrp; Take 10 mLs by mouth every 6 (six) hours.  Dispense: 118 mL; Refill: 0  -     azithromycin (Z-KENTRELL) 250 MG tablet; Take 1 tablet (250 mg total) by mouth once daily. po on day 1 then 1 tab po on days 2-5  Dispense: 6 tablet; Refill: 0    Preventative health care  -     Comprehensive metabolic panel; Future; Expected date: 05/23/2018  -     Lipid panel; Future; Expected date: 05/23/2018        Follow-up if symptoms worsen or fail to improve.      5/23/2018 CATINA Gay, MARIELP       Render Risk Assessment In Note?: no Additional Notes: Includes spot of concern in hpi Detail Level: Simple

## 2024-06-18 ENCOUNTER — HOSPITAL ENCOUNTER (OUTPATIENT)
Facility: HOSPITAL | Age: 64
Discharge: HOME | End: 2024-06-18
Attending: PLASTIC SURGERY | Admitting: PLASTIC SURGERY
Payer: COMMERCIAL

## 2024-06-18 ENCOUNTER — ANESTHESIA EVENT (OUTPATIENT)
Dept: SURGERY | Facility: HOSPITAL | Age: 64
End: 2024-06-18
Payer: COMMERCIAL

## 2024-06-18 ENCOUNTER — ANESTHESIA (OUTPATIENT)
Dept: SURGERY | Facility: HOSPITAL | Age: 64
End: 2024-06-18
Payer: COMMERCIAL

## 2024-06-18 VITALS
SYSTOLIC BLOOD PRESSURE: 142 MMHG | HEART RATE: 65 BPM | RESPIRATION RATE: 16 BRPM | DIASTOLIC BLOOD PRESSURE: 78 MMHG | OXYGEN SATURATION: 96 % | TEMPERATURE: 98 F

## 2024-06-18 DIAGNOSIS — Z85.3 HX OF BREAST CANCER: Primary | ICD-10-CM

## 2024-06-18 DIAGNOSIS — Z01.818 PREOP TESTING: ICD-10-CM

## 2024-06-18 PROCEDURE — 25000003 PHARM REV CODE 250: Performed by: ANESTHESIOLOGY

## 2024-06-18 PROCEDURE — 25000003 PHARM REV CODE 250: Performed by: NURSE ANESTHETIST, CERTIFIED REGISTERED

## 2024-06-18 PROCEDURE — 71000015 HC POSTOP RECOV 1ST HR: Performed by: PLASTIC SURGERY

## 2024-06-18 PROCEDURE — 63600175 PHARM REV CODE 636 W HCPCS: Performed by: NURSE ANESTHETIST, CERTIFIED REGISTERED

## 2024-06-18 PROCEDURE — 36000706: Performed by: PLASTIC SURGERY

## 2024-06-18 PROCEDURE — 37000008 HC ANESTHESIA 1ST 15 MINUTES: Performed by: PLASTIC SURGERY

## 2024-06-18 PROCEDURE — 25000003 PHARM REV CODE 250: Performed by: PLASTIC SURGERY

## 2024-06-18 PROCEDURE — 71000033 HC RECOVERY, INTIAL HOUR: Performed by: PLASTIC SURGERY

## 2024-06-18 PROCEDURE — 37000009 HC ANESTHESIA EA ADD 15 MINS: Performed by: PLASTIC SURGERY

## 2024-06-18 PROCEDURE — 63600175 PHARM REV CODE 636 W HCPCS: Performed by: PLASTIC SURGERY

## 2024-06-18 PROCEDURE — 36000707: Performed by: PLASTIC SURGERY

## 2024-06-18 RX ORDER — DEXAMETHASONE SODIUM PHOSPHATE 4 MG/ML
INJECTION, SOLUTION INTRA-ARTICULAR; INTRALESIONAL; INTRAMUSCULAR; INTRAVENOUS; SOFT TISSUE
Status: DISCONTINUED | OUTPATIENT
Start: 2024-06-18 | End: 2024-06-18

## 2024-06-18 RX ORDER — ACETAMINOPHEN 10 MG/ML
INJECTION, SOLUTION INTRAVENOUS
Status: DISCONTINUED | OUTPATIENT
Start: 2024-06-18 | End: 2024-06-18

## 2024-06-18 RX ORDER — ONDANSETRON 4 MG/1
4 TABLET, ORALLY DISINTEGRATING ORAL ONCE
Status: DISCONTINUED | OUTPATIENT
Start: 2024-06-18 | End: 2024-06-18 | Stop reason: HOSPADM

## 2024-06-18 RX ORDER — SODIUM CHLORIDE, SODIUM LACTATE, POTASSIUM CHLORIDE, CALCIUM CHLORIDE 600; 310; 30; 20 MG/100ML; MG/100ML; MG/100ML; MG/100ML
INJECTION, SOLUTION INTRAVENOUS CONTINUOUS PRN
Status: DISCONTINUED | OUTPATIENT
Start: 2024-06-18 | End: 2024-06-18

## 2024-06-18 RX ORDER — CEFAZOLIN SODIUM 2 G/50ML
2 SOLUTION INTRAVENOUS ONCE
Status: COMPLETED | OUTPATIENT
Start: 2024-06-18 | End: 2024-06-18

## 2024-06-18 RX ORDER — PROPOFOL 10 MG/ML
VIAL (ML) INTRAVENOUS
Status: DISCONTINUED | OUTPATIENT
Start: 2024-06-18 | End: 2024-06-18

## 2024-06-18 RX ORDER — LIDOCAINE HYDROCHLORIDE 10 MG/ML
INJECTION, SOLUTION EPIDURAL; INFILTRATION; INTRACAUDAL; PERINEURAL
Status: DISCONTINUED | OUTPATIENT
Start: 2024-06-18 | End: 2024-06-18

## 2024-06-18 RX ORDER — SUCCINYLCHOLINE CHLORIDE 20 MG/ML
INJECTION INTRAMUSCULAR; INTRAVENOUS
Status: DISCONTINUED | OUTPATIENT
Start: 2024-06-18 | End: 2024-06-18

## 2024-06-18 RX ORDER — ROCURONIUM BROMIDE 10 MG/ML
INJECTION, SOLUTION INTRAVENOUS
Status: DISCONTINUED | OUTPATIENT
Start: 2024-06-18 | End: 2024-06-18

## 2024-06-18 RX ORDER — DIPHENHYDRAMINE HYDROCHLORIDE 50 MG/ML
INJECTION INTRAMUSCULAR; INTRAVENOUS
Status: DISCONTINUED | OUTPATIENT
Start: 2024-06-18 | End: 2024-06-18

## 2024-06-18 RX ORDER — HYDROMORPHONE HYDROCHLORIDE 1 MG/ML
0.2 INJECTION, SOLUTION INTRAMUSCULAR; INTRAVENOUS; SUBCUTANEOUS EVERY 5 MIN PRN
Status: DISCONTINUED | OUTPATIENT
Start: 2024-06-18 | End: 2024-06-18 | Stop reason: HOSPADM

## 2024-06-18 RX ORDER — ONDANSETRON HYDROCHLORIDE 2 MG/ML
4 INJECTION, SOLUTION INTRAVENOUS DAILY PRN
Status: DISCONTINUED | OUTPATIENT
Start: 2024-06-18 | End: 2024-06-18 | Stop reason: HOSPADM

## 2024-06-18 RX ORDER — LIDOCAINE HYDROCHLORIDE AND EPINEPHRINE 10; 10 MG/ML; UG/ML
INJECTION, SOLUTION INFILTRATION; PERINEURAL
Status: DISCONTINUED | OUTPATIENT
Start: 2024-06-18 | End: 2024-06-18 | Stop reason: HOSPADM

## 2024-06-18 RX ORDER — FENTANYL CITRATE 50 UG/ML
INJECTION, SOLUTION INTRAMUSCULAR; INTRAVENOUS
Status: DISCONTINUED | OUTPATIENT
Start: 2024-06-18 | End: 2024-06-18

## 2024-06-18 RX ORDER — DIPHENHYDRAMINE HYDROCHLORIDE 50 MG/ML
12.5 INJECTION INTRAMUSCULAR; INTRAVENOUS
Status: DISCONTINUED | OUTPATIENT
Start: 2024-06-18 | End: 2024-06-18 | Stop reason: HOSPADM

## 2024-06-18 RX ORDER — OXYCODONE HYDROCHLORIDE 5 MG/1
5 TABLET ORAL
Status: DISCONTINUED | OUTPATIENT
Start: 2024-06-18 | End: 2024-06-18 | Stop reason: HOSPADM

## 2024-06-18 RX ORDER — MIDAZOLAM HYDROCHLORIDE 1 MG/ML
INJECTION INTRAMUSCULAR; INTRAVENOUS
Status: DISCONTINUED | OUTPATIENT
Start: 2024-06-18 | End: 2024-06-18

## 2024-06-18 RX ORDER — OXYCODONE HYDROCHLORIDE 5 MG/1
5 TABLET ORAL EVERY 4 HOURS PRN
Status: DISCONTINUED | OUTPATIENT
Start: 2024-06-18 | End: 2024-06-18 | Stop reason: HOSPADM

## 2024-06-18 RX ORDER — FAMOTIDINE 10 MG/ML
INJECTION INTRAVENOUS
Status: DISCONTINUED | OUTPATIENT
Start: 2024-06-18 | End: 2024-06-18

## 2024-06-18 RX ADMIN — SUGAMMADEX 200 MG: 100 INJECTION, SOLUTION INTRAVENOUS at 01:06

## 2024-06-18 RX ADMIN — DEXAMETHASONE SODIUM PHOSPHATE 8 MG: 4 INJECTION, SOLUTION INTRAMUSCULAR; INTRAVENOUS at 12:06

## 2024-06-18 RX ADMIN — Medication 120 MG: at 11:06

## 2024-06-18 RX ADMIN — CEFAZOLIN SODIUM 2 G: 2 SOLUTION INTRAVENOUS at 11:06

## 2024-06-18 RX ADMIN — PROPOFOL 140 MG: 10 INJECTION, EMULSION INTRAVENOUS at 11:06

## 2024-06-18 RX ADMIN — OXYCODONE HYDROCHLORIDE 5 MG: 5 TABLET ORAL at 02:06

## 2024-06-18 RX ADMIN — ACETAMINOPHEN 1000 MG: 10 INJECTION, SOLUTION INTRAVENOUS at 12:06

## 2024-06-18 RX ADMIN — MIDAZOLAM HYDROCHLORIDE 2 MG: 1 INJECTION, SOLUTION INTRAMUSCULAR; INTRAVENOUS at 11:06

## 2024-06-18 RX ADMIN — DIPHENHYDRAMINE HYDROCHLORIDE 12.5 MG: 50 INJECTION INTRAMUSCULAR; INTRAVENOUS at 12:06

## 2024-06-18 RX ADMIN — FENTANYL CITRATE 100 MCG: 50 INJECTION, SOLUTION INTRAMUSCULAR; INTRAVENOUS at 11:06

## 2024-06-18 RX ADMIN — ROCURONIUM BROMIDE 25 MG: 10 INJECTION, SOLUTION INTRAVENOUS at 12:06

## 2024-06-18 RX ADMIN — FAMOTIDINE 20 MG: 10 INJECTION, SOLUTION INTRAVENOUS at 12:06

## 2024-06-18 RX ADMIN — ROCURONIUM BROMIDE 5 MG: 10 INJECTION, SOLUTION INTRAVENOUS at 11:06

## 2024-06-18 RX ADMIN — SODIUM CHLORIDE, SODIUM LACTATE, POTASSIUM CHLORIDE, AND CALCIUM CHLORIDE: .6; .31; .03; .02 INJECTION, SOLUTION INTRAVENOUS at 11:06

## 2024-06-18 RX ADMIN — LIDOCAINE HYDROCHLORIDE 50 MG: 10 INJECTION, SOLUTION EPIDURAL; INFILTRATION; INTRACAUDAL; PERINEURAL at 11:06

## 2024-06-18 NOTE — ANESTHESIA PROCEDURE NOTES
Intubation    Date/Time: 6/18/2024 11:58 AM    Performed by: Fredi Davenport CRNA  Authorized by: Charles Nieves MD    Intubation:     Induction:  Intravenous    Intubated:  Postinduction    Mask Ventilation:  Not attempted    Attempted By:  CRNA    Method of Intubation:  Fiberoptic    Blade:  Coleman 3    Laryngeal View Grade: Grade I - full view of cords      Difficult Airway Encountered?: No      Complications:  None    Airway Device:  Oral endotracheal tube    Airway Device Size:  7.5    Style/Cuff Inflation:  Cuffed (inflated to minimal occlusive pressure)    Inflation Amount (mL):  8    Tube secured:  21    Secured at:  The lips    Placement Verified By:  Capnometry and Fiber optic visualization    Complicating Factors:  None    Findings Post-Intubation:  BS equal bilateral and atraumatic/condition of teeth unchanged

## 2024-06-18 NOTE — ANESTHESIA PREPROCEDURE EVALUATION
06/18/2024  Paula Hernadez is a 64 y.o., female.      Results for orders placed or performed in visit on 02/14/24   IN OFFICE EKG 12-LEAD (to [employer])    Collection Time: 02/14/24 10:54 AM   Result Value Ref Range    QRS Duration 86 ms    [facility] QTC Calculation 439 ms    Narrative    Test Reason : R06.09,R07.89,R00.2,    Vent. Rate : 077 BPM     Atrial Rate : 077 BPM     P-R Int : 158 ms          QRS Dur : 086 ms      QT Int : 388 ms       P-R-T Axes : 071 022 071 degrees     QTc Int : 439 ms    Normal sinus rhythm  Normal ECG  When compared with ECG of 27-MAY-2021 13:24,  No significant change was found  Confirmed by Alan Nicholas MD ([id number]) on 2/14/2024 12:47:28 PM    Referred By: Harpreet Gonsalez           Confirmed By:Alan Nicholas MD             Lab Results   Component Value Date    WBC 4.35 05/22/2024    HGB 12.1 05/22/2024    HCT 38.1 05/22/2024    MCV 89 05/22/2024     05/22/2024     BMP  Lab Results   Component Value Date     (L) 04/16/2024    K 4.1 04/16/2024     04/16/2024    CO2 26 04/16/2024    BUN 15 04/16/2024    CREATININE 1.0 04/16/2024    CALCIUM 8.9 04/16/2024    ANIONGAP 8 04/16/2024     04/16/2024    GLU 84 02/14/2024    GLU 94 10/19/2023       No results found for this or any previous visit.    Conclusion         Normal myocardial perfusion scan. There is no evidence of myocardial ischemia or infarction.    The gated perfusion images showed an ejection fraction of 66% at rest. The gated perfusion images showed an ejection fraction of 63% post stress. Normal ejection fraction is greater than 53%.    There is normal wall motion at rest and post stress.    LV cavity size is normal at rest and normal at stress.    The ECG portion of the study is negative for ischemia.    The patient reported no chest pain during the stress test.    There were no arrhythmias during stress.    The  patient exercised for 4 minutes 12 seconds on a Regan protocol, corresponding to a functional capacity of 6 METS, achieving a peak heart rate of 146 bpm, which is 98 % of the age predicted maximum heart rate.          Pre-op Assessment    I have reviewed the Patient Summary Reports.     I have reviewed the Nursing Notes. I have reviewed the NPO Status.   I have reviewed the Medications.     Review of Systems  Anesthesia Hx:  No problems with previous Anesthesia             Denies Family Hx of Anesthesia complications.    Denies Personal Hx of Anesthesia complications.                    Social:  Former Smoker, Alcohol Use Drug use: Marijuana; Times per week: 2      Hematology/Oncology:  Hematology Normal                       --  Cancer in past history:       Breast    left   surgery and radiation       EENT/Dental:  chronic allergic rhinitis           Cardiovascular:                 MCGOWAN (recent episode of MCGOWAN and chest tightness, nuclear stress test negative)  ECG has been reviewed.                          Pulmonary:  Pulmonary Normal                       Hepatic/GI:     GERD, well controlled             Musculoskeletal:  Arthritis          Spine Disorders: cervical and lumbar Chronic Pain, Disc disease and Degenerative disease           Neurological:        Peripheral Neuropathy                          Endocrine:        Obesity / BMI > 30  Psych:     Sleep Disorder and Insomnia.       Sleep Disorder and Insomnia.        Physical Exam  General: Well nourished, Cooperative, Alert and Oriented    Airway:  Mallampati: III / II  Mouth Opening: Normal  TM Distance: Normal  Tongue: Normal  Neck ROM: Normal ROM    Dental:  Caps / Implants, Intact    Chest/Lungs:  Clear to auscultation    Heart:  Rate: Normal  Rhythm: Regular Rhythm  Sounds: Normal    Abdomen:  Normal, Soft, Nontender        Anesthesia Plan  Type of Anesthesia, risks & benefits discussed:    Anesthesia Type: Gen ETT, Gen Supraglottic Airway  Intra-op  Monitoring Plan: Standard ASA Monitors  Post Op Pain Control Plan: multimodal analgesia and IV/PO Opioids PRN  Induction:  IV  Airway Plan: Video, Post-Induction  Informed Consent: Informed consent signed with the Patient and all parties understand the risks and agree with anesthesia plan.  All questions answered.   ASA Score: 3  Anesthesia Plan Notes:         GETA or LMA   Benadryl 6.25mg iv, Decadron 8mg, iv Zofran 4mg iv, Pepcid 20mg iv  Ofirmev 1000mg iv  Sugammadex        Ready For Surgery From Anesthesia Perspective.     .

## 2024-06-18 NOTE — TRANSFER OF CARE
Anesthesia Transfer of Care Note    Patient: Paula Hernadez    Procedure(s) Performed: Procedure(s) (LRB):  APPLICATION, GRAFT, SKIN, FULL-THICKNESS (Bilateral)    Patient location: PACU    Anesthesia Type: general    Transport from OR: Transported from OR on room air with adequate spontaneous ventilation    Post pain: adequate analgesia    Post assessment: no apparent anesthetic complications    Post vital signs: stable    Level of consciousness: awake    Nausea/Vomiting: no nausea/vomiting    Complications: none    Transfer of care protocol was followed    Last vitals: Visit Vitals  BP (!) 126/91   Pulse 70   Temp 36.7 °C (98 °F) (Oral)   LMP 01/01/2012   SpO2 96%   Breastfeeding No

## 2024-06-18 NOTE — DISCHARGE INSTRUCTIONS
No driving for 24 hours and while taking pain meds.  Leave dressing place until MD followup.  Notify MD for fever > 100.4, signs of infection to incisions(redness, swelling or drainage), severe pain or persistent nausea/vomiting.

## 2024-06-18 NOTE — H&P
64 year old female with a history of breast cancer s/p fat grafting and mastopexy to bilateral breasts complicated by delayed healing of the left, previously radiated breast.     Past Medical History:   Diagnosis Date    Arthritis     Breast cancer     Chronic constipation     Chronic insomnia     Floaters in visual field     GERD (gastroesophageal reflux disease)     Seasonal allergies     Seborrheic keratoses     Thumb pain      Past Surgical History:   Procedure Laterality Date    ABDOMINOPLASTY      BREAST BIOPSY      BREAST MASS EXCISION      COSMETIC SURGERY      tummy tuck    CYST REMOVAL  2021    chest    INTERPOSITION ARTHROPLASTY OF CARPOMETACARPAL JOINTS Left 2021    Procedure: INTERPOSITION ARTHROPLASTY, CMC JOINT;  Surgeon: Giovani Molina MD;  Location: [facility];  Service: Orthopedics;  Laterality: Left;    LASIK Bilateral     LIPOSUCTION W/ FAT INJECTION Bilateral 2024    Procedure: LIPOSUCTION, WITH FAT TRANSFER;  Surgeon: Yuniel Cadena MD;  Location: [facility] OR;  Service: Plastics;  Laterality: Bilateral;    MASTECTOMY, PARTIAL Left 10/19/2021    Procedure: MASTECTOMY, PARTIAL;  Surgeon: Lila Perales MD;  Location: [facility] OR;  Service: General;  Laterality: Left;    MASTOPEXY Bilateral 2024    Procedure: MASTOPEXY;  Surgeon: Yuniel Cadena MD;  Location: [facility] OR;  Service: Plastics;  Laterality: Bilateral;    NEEDLE LOCALIZATION Left 10/19/2021    Procedure: NEEDLE LOCALIZATION;  Surgeon: Lila Perales MD;  Location: [facility];  Service: General;  Laterality: Left;  LOCALIZER DONE 10/15    SHOULDER SURGERY Left     bone spur    THUMB ARTHROSCOPY Left     TONSILLECTOMY       Social History     Tobacco Use    Smoking status: Former     Current packs/day: 0.00     Average packs/day: 0.3 packs/day for 27.8 years (6.9 ttl pk-yrs)     Types: Cigarettes     Start date:      Quit date: 10/6/2002     Years since quittin.7    Smokeless tobacco: Never   Substance Use Topics     Alcohol use: Yes     Alcohol/week: 4.0 standard drinks of alcohol     Types: 4 Standard drinks or equivalent per week     Comment: daiquiris, weekly    Drug use: Yes     Frequency: 2.0 times per week     Types: Marijuana     Comment: thc gummies     Review of patient's allergies indicates:   Allergen Reactions    Codeine Nausea Only         Review of Systems   All other systems reviewed and are negative.      Physical Exam   Vitals reviewed.  Constitutional: She is oriented to person, place, and time.   HENT:   Head: Normocephalic.   Mouth/Throat: Mucous membranes are moist.   Cardiovascular:  Normal rate.            Pulmonary/Chest: Effort normal. No respiratory distress.   Abdominal: Soft. There is no abdominal tenderness.   Neurological: She is alert and oriented to person, place, and time.   Skin: Skin is warm.     Clean, granulated wound bed to left inferior pole of the breast.  No signs of infection.      Assessment:  History of left breast cancer  Open wound to left breast      Plan:  -To OR today for full thickness skin graft to left breast.

## 2024-06-18 NOTE — ANESTHESIA POSTPROCEDURE EVALUATION
Anesthesia Post Evaluation    Patient: Paula Hernadez    Procedure(s) Performed: Procedure(s) (LRB):  APPLICATION, GRAFT, SKIN, FULL-THICKNESS (Bilateral)    Final Anesthesia Type: general      Patient location during evaluation: PACU  Patient participation: Yes- Able to Participate  Level of consciousness: awake and alert, oriented and awake  Post-procedure vital signs: reviewed and stable  Pain management: adequate  Airway patency: patent    PONV status at discharge: No PONV  Anesthetic complications: no      Cardiovascular status: blood pressure returned to baseline, hemodynamically stable and stable  Respiratory status: unassisted, spontaneous ventilation and room air  Hydration status: euvolemic  Follow-up not needed.              Vitals Value Taken Time   /70 06/18/24 1416   Temp 36.1 °C (97 °F) 06/18/24 1415   Pulse 65 06/18/24 1416   Resp 21 06/18/24 1416   SpO2 97 % 06/18/24 1416   Vitals shown include unfiled device data.      No case tracking events are documented in the log.      Pain/Rkish Score: Pain Rating Prior to Med Admin: 3 (6/18/2024  2:01 PM)  Krish Score: 8 (6/18/2024  1:40 PM)

## 2024-07-02 NOTE — DISCHARGE SUMMARY
Discharge Summary:     Disposition: Home    Dx:  History of breast cancer  Open wound of left breast    Treatment:  FTSG to left breast      Patient tolerated the procedure well. She is now stable and ready for discharge.    Follow up in clinic in ~1 week.      Keep dressing clean, dry, and intact until clinic follow up.

## 2024-07-02 NOTE — OP NOTE
Surgeon: Yuniel Cadena MD     Assistant: Shannon Hernandez PA-C     Preop Dx:  Personal history of breast cancer  Open wound to left reconstructed breast     Postop Dx:  Personal history of breast cancer  Open wound to left reconstructed breast     Procedure Performed:  Surgical preparation of recipient site left breast, total surface area of 48 cm2  Full thickness skin graft to left breast 48 cm2     Indication: 64F with history of breast cancer treated with lumpectomy and radiation, and subsequently with a symmetry procedure, now with wound to left radiated breast.      Anesthesia: General endotracheal     Blood Loss: 20 cc     Specimens: none     Drains: none     Procedure in detail: After anesthesia was induced the patient was prepped and draped in a sterile fashion. We began with meticulous debridement of the right breast wound to remove devitalized tissue from the wound bed, and freshened up the skin edges with a 15 blade scalpel. We achieved hemostasis, and then moved to the right lateral extent of the existing abdominal incision where we planned to harvest the skin graft.      An elliptical incision was drawn and then sharply executed. The full thickness graft was harvested using a 10 blade scalpel, and then thinned of any remaining subcutaneous fat. It was placed onto the right breast breast wound and cut to fit. The graft was then sewn into place with 5-0 prolene sutures, and then secured into place using an incisional wound VAC     The donor site was closed with deep 2-0 vicryl sutures, and then a 2 layered running 2-0 monoderm deep dermal and subcuticular quill suture. That completed the procedure, the patient was extubated, taken to recover, tolerated the procedure well, there were no complications.

## 2024-09-13 ENCOUNTER — TELEPHONE (OUTPATIENT)
Facility: CLINIC | Age: 64
End: 2024-09-13
Payer: COMMERCIAL

## 2024-09-13 DIAGNOSIS — D05.12 DUCTAL CARCINOMA IN SITU (DCIS) OF LEFT BREAST: Primary | ICD-10-CM

## 2024-09-13 DIAGNOSIS — T86.829 SKIN GRAFT DISORDER: ICD-10-CM

## 2024-09-13 NOTE — TELEPHONE ENCOUNTER
Called patient on regard to wound care for skin graft, per Dr. Patel and Aminah Esqueda NP, patient to be referred to Dr. Gray in wound care. Patient stated understanding.

## 2024-09-19 ENCOUNTER — OFFICE VISIT (OUTPATIENT)
Dept: WOUND CARE | Facility: HOSPITAL | Age: 64
End: 2024-09-19
Attending: FAMILY MEDICINE
Payer: COMMERCIAL

## 2024-09-19 VITALS
RESPIRATION RATE: 17 BRPM | HEART RATE: 87 BPM | TEMPERATURE: 98 F | DIASTOLIC BLOOD PRESSURE: 85 MMHG | SYSTOLIC BLOOD PRESSURE: 131 MMHG

## 2024-09-19 DIAGNOSIS — T81.31XA SURGICAL WOUND DEHISCENCE, INITIAL ENCOUNTER: ICD-10-CM

## 2024-09-19 DIAGNOSIS — S21.002A OPEN WOUND OF LEFT BREAST, INITIAL ENCOUNTER: Primary | ICD-10-CM

## 2024-09-19 DIAGNOSIS — D05.12 DUCTAL CARCINOMA IN SITU (DCIS) OF LEFT BREAST: ICD-10-CM

## 2024-09-19 DIAGNOSIS — T86.829 SKIN GRAFT DISORDER: ICD-10-CM

## 2024-09-19 PROCEDURE — 11045 DBRDMT SUBQ TISS EACH ADDL: CPT | Mod: PN | Performed by: FAMILY MEDICINE

## 2024-09-19 PROCEDURE — 99213 OFFICE O/P EST LOW 20 MIN: CPT | Mod: 25,PN | Performed by: FAMILY MEDICINE

## 2024-09-19 PROCEDURE — 11042 DBRDMT SUBQ TIS 1ST 20SQCM/<: CPT | Mod: PN | Performed by: FAMILY MEDICINE

## 2024-09-19 RX ORDER — COLLAGENASE SANTYL 250 [ARB'U]/G
OINTMENT TOPICAL DAILY
Qty: 30 G | Refills: 3 | Status: SHIPPED | OUTPATIENT
Start: 2024-09-19 | End: 2024-09-19 | Stop reason: HOSPADM

## 2024-09-19 RX ORDER — COLLAGENASE SANTYL 250 [ARB'U]/G
OINTMENT TOPICAL DAILY
Qty: 30 G | Refills: 3 | Status: SHIPPED | OUTPATIENT
Start: 2024-09-19 | End: 2024-10-19

## 2024-09-19 NOTE — PROGRESS NOTES
Chief complaint:  No chief complaint on file.      HPI:  Paula Hernadez is a 64 y.o. female presenting with an open wound of the left breast from a dehisced surgical wound. Pt had breast CA in  and had recon surgery this year for the left breast. Pt had multiple flaps which failed and the surgical wound dehisced. No other complaints today    PMH:  As per HPI and below:  Past Medical History:   Diagnosis Date    Arthritis     Breast cancer     Chronic constipation     Chronic insomnia     Floaters in visual field     GERD (gastroesophageal reflux disease)     Seasonal allergies     Seborrheic keratoses     Thumb pain 2018       Social History     Socioeconomic History    Marital status:     Number of children: 1   Occupational History    Occupation: clerical   Tobacco Use    Smoking status: Former     Current packs/day: 0.00     Average packs/day: 0.3 packs/day for 27.8 years (6.9 ttl pk-yrs)     Types: Cigarettes     Start date:      Quit date: 10/6/2002     Years since quittin.9    Smokeless tobacco: Never   Substance and Sexual Activity    Alcohol use: Yes     Alcohol/week: 4.0 standard drinks of alcohol     Types: 4 Standard drinks or equivalent per week     Comment: tyleriris, weekly    Drug use: Yes     Frequency: 2.0 times per week     Types: Marijuana     Comment: thc gummies    Sexual activity: Yes     Partners: Female     Birth control/protection: Post-menopausal   Social History Narrative    Live with      Social Determinants of Health     Financial Resource Strain: Low Risk  (2023)    Overall Financial Resource Strain (CARDIA)     Difficulty of Paying Living Expenses: Not hard at all   Food Insecurity: No Food Insecurity (2023)    Hunger Vital Sign     Worried About Running Out of Food in the Last Year: Never true     Ran Out of Food in the Last Year: Never true   Transportation Needs: No Transportation Needs (2023)    PRAPARE - Transportation     Lack of  Transportation (Medical): No     Lack of Transportation (Non-Medical): No   Physical Activity: Unknown (12/18/2023)    Exercise Vital Sign     Days of Exercise per Week: 0 days   Stress: Stress Concern Present (12/18/2023)    [employer] [place] [employer] - Occupational Stress Questionnaire     Feeling of Stress : To some extent   Housing Stability: Low Risk  (12/18/2023)    Housing Stability Vital Sign     Unable to Pay for Housing in the Last Year: No     Number of Places Lived in the Last Year: 1     Unstable Housing in the Last Year: No       Past Surgical History:   Procedure Laterality Date    ABDOMINOPLASTY      BREAST BIOPSY      BREAST MASS EXCISION      COSMETIC SURGERY      tummy tuck    CYST REMOVAL  05/27/2021    chest    INTERPOSITION ARTHROPLASTY OF CARPOMETACARPAL JOINTS Left 06/09/2021    Procedure: INTERPOSITION ARTHROPLASTY, CMC JOINT;  Surgeon: Giovani Molina MD;  Location: [facility];  Service: Orthopedics;  Laterality: Left;    LASIK Bilateral     LIPOSUCTION W/ FAT INJECTION Bilateral 04/09/2024    Procedure: LIPOSUCTION, WITH FAT TRANSFER;  Surgeon: Yuniel Cadena MD;  Location: [facility];  Service: Plastics;  Laterality: Bilateral;    MASTECTOMY, PARTIAL Left 10/19/2021    Procedure: MASTECTOMY, PARTIAL;  Surgeon: Lila Perales MD;  Location: [facility] OR;  Service: General;  Laterality: Left;    MASTOPEXY Bilateral 04/09/2024    Procedure: MASTOPEXY;  Surgeon: Yuniel Cadena MD;  Location: [facility] OR;  Service: Plastics;  Laterality: Bilateral;    NEEDLE LOCALIZATION Left 10/19/2021    Procedure: NEEDLE LOCALIZATION;  Surgeon: Lila Perales MD;  Location: [facility] OR;  Service: General;  Laterality: Left;  LOCALIZER DONE 10/15    SHOULDER SURGERY Left     bone spur    SKIN FULL THICKNESS GRAFT Bilateral 6/18/2024    Procedure: APPLICATION, GRAFT, SKIN, FULL-THICKNESS;  Surgeon: Yuniel Cadena MD;  Location: [facility];  Service: Plastics;  Laterality: Bilateral;    THUMB ARTHROSCOPY Left      TONSILLECTOMY         Family History   Problem Relation Name Age of Onset    Stroke Mother      Prostate cancer Father      Melanoma Neg Hx      Psoriasis Neg Hx      Lupus Neg Hx      Eczema Neg Hx         Review of patient's allergies indicates:   Allergen Reactions    Codeine Nausea Only       Current Outpatient Medications on File Prior to Visit   Medication Sig Dispense Refill    acetaminophen (TYLENOL) 500 MG tablet Take 1,000 mg by mouth every 6 (six) hours as needed for Pain.      anastrozole (ARIMIDEX) 1 mg Tab Take 1 tablet (1 mg total) by mouth once daily. 90 tablet 3    cyclobenzaprine (FLEXERIL) 5 MG tablet Take 5 mg by mouth nightly. 1-2 daily      docusate sodium (STOOL SOFTENER) 100 mg capsule Take 100 mg by mouth 2 (two) times daily.      omeprazole (PRILOSEC) 20 MG capsule Take 20 mg by mouth once daily.       temazepam (RESTORIL) 15 mg Cap Take 1 capsule (15 mg total) by mouth nightly as needed (insomnia). 30 capsule 1    venlafaxine (EFFEXOR-XR) 37.5 MG 24 hr capsule Take 1 capsule (37.5 mg total) by mouth once daily. (Patient taking differently: Take 75 mg by mouth every evening.) 90 capsule 3     No current facility-administered medications on file prior to visit.       ROS: As per HPI and below:  Pertinent items are noted in HPI.      Physical Exam:     There were no vitals filed for this visit.              General:             Well developed, well nourished, no apparent distress  HEENT:              NCAT, no JVD, mucous membranes moist, EOM intact  Cardiovascular:  Regular rate and rhythm, normal S1, normal S2, No murmurs, rubs, or gallops  Respiratory:        Normal breath sounds, no wheezes, no rales, no rhonchi  Abdomen:           Bowel sounds present, non tender, non distended, no masses, no hepatojugular reflux  Extremities:        No clubbing, no cyanosis, no edema  Vascular:            2+ b/l radial.  Peripheral pulses intact.  No carotid bruits.  Neurological:      No focal  "deficits  Skin:                   No obvious rashes or erythema, left breast dehisced wound with failed flap, see wound care assessment documentation in chart review scanned under the media tab    Lab Results   Component Value Date    WBC 4.35 05/22/2024    HGB 12.1 05/22/2024    HCT 38.1 05/22/2024    MCV 89 05/22/2024     05/22/2024     Lab Results   Component Value Date    CHOL 246 (H) 10/19/2023    CHOL 200 (H) 06/20/2018     Lab Results   Component Value Date    HDL 87 10/19/2023    HDL 88 06/20/2018     Lab Results   Component Value Date    LDLCALC 131 (H) 10/19/2023    LDLCALC 92 06/20/2018     Lab Results   Component Value Date    TRIG 160 (H) 10/19/2023    TRIG 102 06/20/2018     No results found for: "CHOLHDL"  CMP   Lab Results   Component Value Date    TSH 2.231 02/14/2024           Assessment and Recommendations       Diagnoses:    1.Dehisced surgical wound of the left breast  2. Failed flap of the left breast  3. Open wound of the left breast    Plan:  1. Wound debrided today, three staples buried in the wound  2. Santyl to the wound daily    Complexity:    moderate      "

## 2024-09-25 ENCOUNTER — OFFICE VISIT (OUTPATIENT)
Dept: WOUND CARE | Facility: HOSPITAL | Age: 64
End: 2024-09-25
Attending: FAMILY MEDICINE
Payer: COMMERCIAL

## 2024-09-25 VITALS
DIASTOLIC BLOOD PRESSURE: 73 MMHG | SYSTOLIC BLOOD PRESSURE: 143 MMHG | RESPIRATION RATE: 18 BRPM | TEMPERATURE: 98 F | HEART RATE: 82 BPM

## 2024-09-25 DIAGNOSIS — T86.829 SKIN GRAFT DISORDER: ICD-10-CM

## 2024-09-25 DIAGNOSIS — T81.31XD SURGICAL WOUND DEHISCENCE, SUBSEQUENT ENCOUNTER: ICD-10-CM

## 2024-09-25 DIAGNOSIS — D05.12 DUCTAL CARCINOMA IN SITU (DCIS) OF LEFT BREAST: Primary | ICD-10-CM

## 2024-09-25 DIAGNOSIS — S21.002D OPEN WOUND OF LEFT BREAST, SUBSEQUENT ENCOUNTER: ICD-10-CM

## 2024-09-25 PROCEDURE — 99499 UNLISTED E&M SERVICE: CPT | Mod: ,,, | Performed by: FAMILY MEDICINE

## 2024-09-25 PROCEDURE — 11042 DBRDMT SUBQ TIS 1ST 20SQCM/<: CPT | Mod: ,,, | Performed by: FAMILY MEDICINE

## 2024-09-25 PROCEDURE — 11042 DBRDMT SUBQ TIS 1ST 20SQCM/<: CPT | Mod: PN | Performed by: FAMILY MEDICINE

## 2024-09-25 PROCEDURE — 11045 DBRDMT SUBQ TISS EACH ADDL: CPT | Mod: PN | Performed by: FAMILY MEDICINE

## 2024-09-25 PROCEDURE — 11045 DBRDMT SUBQ TISS EACH ADDL: CPT | Mod: ,,, | Performed by: FAMILY MEDICINE

## 2024-09-25 NOTE — PROGRESS NOTES
Wound Care Progress Note    Subjective:       Patient ID: Paula Hernadez is a 64 y.o. female.    Chief Complaint: Wound Care    HPI  Pt seen in clinic for a FU visit for a left breast surgical wound. Wound is improved, pt using santyl. No new com plaints today  Review of Systems   Skin:  Positive for wound.        Open wound left breast   All other systems reviewed and are negative.      Objective:        Physical Exam  Vitals and nursing note reviewed. Exam conducted with a chaperone present.   Constitutional:       General: She is not in acute distress.     Appearance: Normal appearance.   Skin:     General: Skin is warm and dry.      Findings: Lesion present.      Comments: Left breast surgical wound, see wound care assessment documentation in chart review scanned under the media tab   Neurological:      General: No focal deficit present.      Mental Status: She is alert and oriented to person, place, and time.   Psychiatric:         Mood and Affect: Mood normal.         Judgment: Judgment normal.       Vitals:    09/25/24 1023   BP: (!) 143/73   Pulse: 82   Resp: 18   Temp: 98.2 °F (36.8 °C)       Assessment:           ICD-10-CM ICD-9-CM   1. Ductal carcinoma in situ (DCIS) of left breast  D05.12 233.0   2. Open wound of left breast, subsequent encounter  S21.002D V58.89     879.0   3. Surgical wound dehiscence, subsequent encounter  T81.31XD V58.89     998.32   4. Skin graft disorder  T86.829 996.79                Plan:                  Paula was seen today for wound care.    Diagnoses and all orders for this visit:    Ductal carcinoma in situ (DCIS) of left breast    Open wound of left breast, subsequent encounter    Surgical wound dehiscence, subsequent encounter    Skin graft disorder        Much of the documentation for this visit was completed in the Wound Docs system.  Please see the attached documentation for further details about the patient's care. Scanned under the Media tab.

## 2024-10-02 ENCOUNTER — HOSPITAL ENCOUNTER (OUTPATIENT)
Dept: RADIOLOGY | Facility: HOSPITAL | Age: 64
Discharge: HOME | End: 2024-10-02
Attending: FAMILY MEDICINE
Payer: COMMERCIAL

## 2024-10-02 ENCOUNTER — OFFICE VISIT (OUTPATIENT)
Dept: WOUND CARE | Facility: HOSPITAL | Age: 64
End: 2024-10-02
Attending: FAMILY MEDICINE
Payer: COMMERCIAL

## 2024-10-02 VITALS
HEART RATE: 80 BPM | TEMPERATURE: 98 F | RESPIRATION RATE: 16 BRPM | DIASTOLIC BLOOD PRESSURE: 70 MMHG | SYSTOLIC BLOOD PRESSURE: 140 MMHG

## 2024-10-02 DIAGNOSIS — D05.12 DUCTAL CARCINOMA IN SITU (DCIS) OF LEFT BREAST: Primary | ICD-10-CM

## 2024-10-02 DIAGNOSIS — T86.829 SKIN GRAFT DISORDER: ICD-10-CM

## 2024-10-02 DIAGNOSIS — T81.31XD SURGICAL WOUND DEHISCENCE, SUBSEQUENT ENCOUNTER: ICD-10-CM

## 2024-10-02 DIAGNOSIS — S21.002D OPEN WOUND OF LEFT BREAST, SUBSEQUENT ENCOUNTER: ICD-10-CM

## 2024-10-02 PROCEDURE — 71046 X-RAY EXAM CHEST 2 VIEWS: CPT | Mod: 26,,, | Performed by: RADIOLOGY

## 2024-10-02 PROCEDURE — 71046 X-RAY EXAM CHEST 2 VIEWS: CPT | Mod: TC

## 2024-10-02 PROCEDURE — 99213 OFFICE O/P EST LOW 20 MIN: CPT | Mod: ,,, | Performed by: FAMILY MEDICINE

## 2024-10-02 PROCEDURE — 99213 OFFICE O/P EST LOW 20 MIN: CPT | Mod: PN | Performed by: FAMILY MEDICINE

## 2024-10-02 PROCEDURE — 1159F MED LIST DOCD IN RCRD: CPT | Mod: CPTII,,, | Performed by: FAMILY MEDICINE

## 2024-10-02 PROCEDURE — 1160F RVW MEDS BY RX/DR IN RCRD: CPT | Mod: CPTII,,, | Performed by: FAMILY MEDICINE

## 2024-10-02 NOTE — PROGRESS NOTES
Wound Care Progress Note    Subjective:       Patient ID: Paula Hernadez is a 64 y.o. female.    Chief Complaint: No chief complaint on file.    HPI  Pt seen in clinic for a FU for left breast open surgical wound. Wound is stable, another staple worked its way out of the chest. Pt concerned about a hard spot with the staples and asked if a CXR would show anything. No other complaints today  Review of Systems   Skin:  Positive for wound.        Left chest open wound   All other systems reviewed and are negative.      Objective:        Physical Exam  Vitals and nursing note reviewed.   Constitutional:       Appearance: Normal appearance. She is not toxic-appearing.   Skin:     General: Skin is warm and dry.      Findings: Lesion present.      Comments: Left chest surgical wound, see wound care assessment documentation in chart review scanned under the media tab   Neurological:      General: No focal deficit present.      Mental Status: She is alert.   Psychiatric:         Mood and Affect: Mood normal.         Judgment: Judgment normal.       There were no vitals filed for this visit.    Assessment:           ICD-10-CM ICD-9-CM   1. Ductal carcinoma in situ (DCIS) of left breast  D05.12 233.0   2. Surgical wound dehiscence, subsequent encounter  T81.31XD V58.89     998.32   3. Open wound of left breast, subsequent encounter  S21.002D V58.89     879.0   4. Skin graft disorder  T86.829 996.79                Plan:                  Diagnoses and all orders for this visit:    Ductal carcinoma in situ (DCIS) of left breast    Surgical wound dehiscence, subsequent encounter  -     X-Ray Chest PA And Lateral; Future    Open wound of left breast, subsequent encounter    Skin graft disorder      Much of the documentation for this visit was completed in the Wound Docs system.  Please see the attached documentation for further details about the patient's care. Scanned under the Media tab.

## 2024-10-14 ENCOUNTER — TELEPHONE (OUTPATIENT)
Facility: CLINIC | Age: 64
End: 2024-10-14
Payer: COMMERCIAL

## 2024-10-14 DIAGNOSIS — D05.12 DUCTAL CARCINOMA IN SITU (DCIS) OF LEFT BREAST: Primary | ICD-10-CM

## 2024-10-14 DIAGNOSIS — D64.9 ANEMIA, UNSPECIFIED TYPE: ICD-10-CM

## 2024-10-16 ENCOUNTER — OFFICE VISIT (OUTPATIENT)
Dept: WOUND CARE | Facility: HOSPITAL | Age: 64
End: 2024-10-16
Attending: FAMILY MEDICINE
Payer: COMMERCIAL

## 2024-10-16 VITALS
RESPIRATION RATE: 16 BRPM | TEMPERATURE: 96 F | DIASTOLIC BLOOD PRESSURE: 68 MMHG | HEART RATE: 78 BPM | SYSTOLIC BLOOD PRESSURE: 138 MMHG

## 2024-10-16 DIAGNOSIS — T81.31XD SURGICAL WOUND DEHISCENCE, SUBSEQUENT ENCOUNTER: Primary | ICD-10-CM

## 2024-10-16 DIAGNOSIS — S21.002D OPEN WOUND OF LEFT BREAST, SUBSEQUENT ENCOUNTER: ICD-10-CM

## 2024-10-16 DIAGNOSIS — T86.829 SKIN GRAFT DISORDER: ICD-10-CM

## 2024-10-16 PROCEDURE — 99214 OFFICE O/P EST MOD 30 MIN: CPT | Mod: PN | Performed by: FAMILY MEDICINE

## 2024-10-16 PROCEDURE — 99213 OFFICE O/P EST LOW 20 MIN: CPT | Mod: ,,, | Performed by: FAMILY MEDICINE

## 2024-10-16 PROCEDURE — 1160F RVW MEDS BY RX/DR IN RCRD: CPT | Mod: CPTII,,, | Performed by: FAMILY MEDICINE

## 2024-10-16 PROCEDURE — 1159F MED LIST DOCD IN RCRD: CPT | Mod: CPTII,,, | Performed by: FAMILY MEDICINE

## 2024-10-16 NOTE — PROGRESS NOTES
Wound Care Progress Note    Subjective:       Patient ID: Paula Hernadez is a 64 y.o. female.    Chief Complaint: No chief complaint on file.    HPI  Pt seen in clinic for a FU visit for a left breast open surgical wound. Wound is stable with some minimal improvement. Pt has no new complaints today  Review of Systems   Skin:  Positive for wound.        Open wound left breast   All other systems reviewed and are negative.      Objective:        Physical Exam  Vitals and nursing note reviewed.   Constitutional:       General: She is not in acute distress.     Appearance: Normal appearance.   Skin:     General: Skin is warm and dry.      Findings: Lesion present.      Comments: Left breast open wound, see wound care assessment documentation in chart review scanned under the media tab   Neurological:      General: No focal deficit present.      Mental Status: She is alert.   Psychiatric:         Judgment: Judgment normal.       There were no vitals filed for this visit.    Assessment:           ICD-10-CM ICD-9-CM   1. Surgical wound dehiscence, subsequent encounter  T81.31XD V58.89     998.32   2. Open wound of left breast, subsequent encounter  S21.002D V58.89     879.0   3. Skin graft disorder  T86.829 996.79                Plan:                  Diagnoses and all orders for this visit:    Surgical wound dehiscence, subsequent encounter    Open wound of left breast, subsequent encounter    Skin graft disorder        See wound care instructions provided separately

## 2024-10-22 PROBLEM — Z79.811 LONG TERM (CURRENT) USE OF AROMATASE INHIBITORS: Status: ACTIVE | Noted: 2024-10-22

## 2024-10-22 PROBLEM — D64.9 ANEMIA, UNSPECIFIED: Status: ACTIVE | Noted: 2024-10-22

## 2024-10-22 PROBLEM — Z78.0 POST-MENOPAUSAL: Status: ACTIVE | Noted: 2024-10-22

## 2024-10-22 PROBLEM — R23.2 HOT FLASHES: Status: ACTIVE | Noted: 2024-10-22

## 2024-10-22 NOTE — PROGRESS NOTES
PROGRESS NOTE    Subjective:       Patient ID: Paula Hernadez is a 64 y.o. female.    9/29/2021  Dx Mammo:  microcalc in left breast at 4-5 o'clock area.     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     1/19/2022  Radiation complete    2/9/2022  Arimidex started.     1/19/2022:  Dexa: Normal    6/20/2022:  Bilateral mammogram negative  Recheck left breast in 6 months(surgical changes)    1/11/2023:  Left breast microcal biopsy:  BREAST, LEFT, MICROCALCIFICATIONS, STEREOTACTIC GUIDED CORE BIOPSY   - FAT NECROSIS AND FIBROSIS WITH ASSOCIATED MICROCALCIFICATIONS     Chief Complaint:  DCIS follow up    History of Present Illness:   Paula Hernadez is a 64 y.o. female who presents for follow up of above.      Ms. Hernadez is doing well today.  Hgb today is 13 Ferritin pending. She continue on Anastrazole and is tolerating it well. She does have a new rash to abd and back.     Patient had reconstruction in April and has had 2 failed skin graft since and continues with wound care.    She remains on Arimidex as of today, 10/23/2024    Family and Social history reviewed and is unchanged from 11/17/2021      ROS:  Review of Systems   Constitutional:  Positive for malaise/fatigue.   Respiratory:  Negative for cough and shortness of breath.    Cardiovascular:  Negative for chest pain.   Gastrointestinal:  Negative for abdominal pain and diarrhea.   Genitourinary:  Negative for frequency.   Musculoskeletal:  Negative for back pain.   Skin:  Positive for rash.   Neurological:  Positive for headaches.   Psychiatric/Behavioral:  The patient is not nervous/anxious.          Current Outpatient Medications:     acetaminophen (TYLENOL) 500 MG tablet, Take 1,000 mg by mouth every 6 (six) hours as needed for Pain., Disp: , Rfl:     anastrozole (ARIMIDEX) 1 mg Tab, Take 1 tablet (1 mg  total) by mouth once daily., Disp: 90 tablet, Rfl: 3    docusate sodium (STOOL SOFTENER) 100 mg capsule, Take 100 mg by mouth 2 (two) times daily., Disp: , Rfl:     omeprazole (PRILOSEC) 20 MG capsule, Take 20 mg by mouth once daily. , Disp: , Rfl:     temazepam (RESTORIL) 15 mg Cap, Take 1 capsule (15 mg total) by mouth nightly as needed (insomnia)., Disp: 30 capsule, Rfl: 1    triamcinolone acetonide 0.1% (KENALOG) 0.1 % cream, Apply topically 2 (two) times daily., Disp: 80 g, Rfl: 2    venlafaxine (EFFEXOR-XR) 37.5 MG 24 hr capsule, Take 1 capsule (37.5 mg total) by mouth once daily. (Patient taking differently: Take 75 mg by mouth every evening.), Disp: 90 capsule, Rfl: 3        Objective:       Physical Examination:     BP (!) 148/67 (BP Location: Left arm, Patient Position: Sitting)   Pulse 75   Temp 98.2 °F (36.8 °C)   Resp 18   Wt 92.5 kg (204 lb)   LMP 01/01/2012 Comment: menopause  BMI 31.02 kg/m²     Physical Exam  Physical Exam  Constitutional:       Appearance: Normal appearance.   HENT:      Head: Normocephalic and atraumatic.      Right Ear: External ear normal.      Left Ear: External ear normal.      Nose: Nose normal.      Mouth/Throat:      Mouth: Mucous membranes are moist.   Eyes:      Pupils: Pupils are equal, round, and reactive to light.   Cardiovascular:      Rate and Rhythm: Normal rate and regular rhythm.   Pulmonary:      Breath sounds: Normal breath sounds.   Chest:   Breasts:     Right: Absent.      Left: Absent.       Abdominal:      Palpations: Abdomen is soft.   Musculoskeletal:      Right lower leg: No edema.      Left lower leg: No edema.   Lymphadenopathy:      Upper Body:      Right upper body: No supraclavicular, axillary or pectoral adenopathy.      Left upper body: No supraclavicular, axillary or pectoral adenopathy.   Skin:     General: Skin is warm and dry.   Neurological:      General: No focal deficit present.      Mental Status: She is alert and oriented to person,  place, and time.   Psychiatric:         Mood and Affect: Mood normal.         Behavior: Behavior normal.         Thought Content: Thought content normal.         Judgment: Judgment normal.                  Labs:   No results found for this or any previous visit (from the past 2 weeks).      CMP  Sodium   Date Value Ref Range Status   04/16/2024 135 (L) 136 - 145 mmol/L Final     Potassium   Date Value Ref Range Status   04/16/2024 4.1 3.5 - 5.1 mmol/L Final     Chloride   Date Value Ref Range Status   04/16/2024 101 95 - 110 mmol/L Final     CO2   Date Value Ref Range Status   04/16/2024 26 23 - 29 mmol/L Final     Glucose   Date Value Ref Range Status   04/16/2024 100 70 - 110 mg/dL Final     BUN   Date Value Ref Range Status   04/16/2024 15 8 - 23 mg/dL Final     Creatinine   Date Value Ref Range Status   04/16/2024 1.0 0.5 - 1.4 mg/dL Final     Calcium   Date Value Ref Range Status   04/16/2024 8.9 8.7 - 10.5 mg/dL Final     Total Protein   Date Value Ref Range Status   04/16/2024 6.6 6.0 - 8.4 g/dL Final     Albumin   Date Value Ref Range Status   04/16/2024 3.6 3.5 - 5.2 g/dL Final     Total Bilirubin   Date Value Ref Range Status   04/16/2024 0.9 0.1 - 1.0 mg/dL Final     Comment:     For infants and newborns, interpretation of results should be based  on gestational age, weight and in agreement with clinical  observations.    Premature Infant recommended reference ranges:  Up to 24 hours.............<8.0 mg/dL  Up to 48 hours............<12.0 mg/dL  3-5 days..................<15.0 mg/dL  6-29 days.................<15.0 mg/dL       Alkaline Phosphatase   Date Value Ref Range Status   04/16/2024 93 55 - 135 U/L Final     AST   Date Value Ref Range Status   04/16/2024 19 10 - 40 U/L Final     ALT   Date Value Ref Range Status   04/16/2024 19 10 - 44 U/L Final     Anion Gap   Date Value Ref Range Status   04/16/2024 8 8 - 16 mmol/L Final     eGFR if    Date Value Ref Range Status   06/15/2022  ">60.0 >60 mL/min/1.73 m^2 Final     eGFR if non    Date Value Ref Range Status   06/15/2022 >60.0 >60 mL/min/1.73 m^2 Final     Comment:     Calculation used to obtain the estimated glomerular filtration  rate (eGFR) is the CKD-EPI equation.        No results found for: "CEA"      Assessment/Plan:     Problem List Items Addressed This Visit          Derm    Nail fungus     Persistent nail fungus not responding to OTC treatment; Amb ref to Podiatry         Relevant Orders    Ambulatory referral/consult to Podiatry    Rash     Rash on abd not responding to gold bond; Start Triamcinolone BID         Relevant Medications    triamcinolone acetonide 0.1% (KENALOG) 0.1 % cream       Renal/    Post-menopausal     Bone density on May normal- Due May 2026            Oncology    DCIS-Left Breast-ER/VA pos - Primary     Patient still healing from Breast surgery and doing wound care. Will move mammogram and US to Mid-Dec         Relevant Orders    US Breast Bilateral Limited    Long term (current) use of aromatase inhibitors    Anemia, unspecified     Hgb 13 today; Ferritin still pending            Orthopedic    Open wound of left breast     Continue follow up with wound care as scheduled            Other    Insomnia, persistent     Continue restoril PRN         Hot flashes     Continue Effexor            Discussion:     Follow up in about 2 months (around 12/23/2024) for after mammogram.      Electronically signed by Aminah Esqueda, MSN, APRN, AGNP-C, OCN                  "

## 2024-10-23 ENCOUNTER — OFFICE VISIT (OUTPATIENT)
Dept: WOUND CARE | Facility: HOSPITAL | Age: 64
End: 2024-10-23
Attending: FAMILY MEDICINE
Payer: COMMERCIAL

## 2024-10-23 ENCOUNTER — LAB VISIT (OUTPATIENT)
Dept: LAB | Facility: HOSPITAL | Age: 64
End: 2024-10-23
Attending: NURSE PRACTITIONER
Payer: COMMERCIAL

## 2024-10-23 ENCOUNTER — TELEPHONE (OUTPATIENT)
Facility: CLINIC | Age: 64
End: 2024-10-23

## 2024-10-23 ENCOUNTER — OFFICE VISIT (OUTPATIENT)
Facility: CLINIC | Age: 64
End: 2024-10-23
Payer: COMMERCIAL

## 2024-10-23 VITALS
DIASTOLIC BLOOD PRESSURE: 67 MMHG | BODY MASS INDEX: 31.02 KG/M2 | SYSTOLIC BLOOD PRESSURE: 148 MMHG | RESPIRATION RATE: 18 BRPM | HEART RATE: 75 BPM | WEIGHT: 204 LBS | TEMPERATURE: 98 F

## 2024-10-23 VITALS
DIASTOLIC BLOOD PRESSURE: 72 MMHG | RESPIRATION RATE: 18 BRPM | HEART RATE: 79 BPM | TEMPERATURE: 98 F | SYSTOLIC BLOOD PRESSURE: 134 MMHG

## 2024-10-23 DIAGNOSIS — R23.2 HOT FLASHES: ICD-10-CM

## 2024-10-23 DIAGNOSIS — S21.002D OPEN WOUND OF LEFT BREAST, SUBSEQUENT ENCOUNTER: ICD-10-CM

## 2024-10-23 DIAGNOSIS — D64.9 ANEMIA, UNSPECIFIED TYPE: ICD-10-CM

## 2024-10-23 DIAGNOSIS — D05.12 DUCTAL CARCINOMA IN SITU (DCIS) OF LEFT BREAST: Primary | ICD-10-CM

## 2024-10-23 DIAGNOSIS — T86.829 SKIN GRAFT DISORDER: ICD-10-CM

## 2024-10-23 DIAGNOSIS — D05.12 DUCTAL CARCINOMA IN SITU (DCIS) OF LEFT BREAST: ICD-10-CM

## 2024-10-23 DIAGNOSIS — Z79.811 LONG TERM (CURRENT) USE OF AROMATASE INHIBITORS: ICD-10-CM

## 2024-10-23 DIAGNOSIS — R21 RASH: ICD-10-CM

## 2024-10-23 DIAGNOSIS — G47.00 INSOMNIA, PERSISTENT: ICD-10-CM

## 2024-10-23 DIAGNOSIS — B35.1 NAIL FUNGUS: ICD-10-CM

## 2024-10-23 DIAGNOSIS — Z78.0 POST-MENOPAUSAL: ICD-10-CM

## 2024-10-23 DIAGNOSIS — S21.002S OPEN WOUND OF LEFT BREAST, SEQUELA: ICD-10-CM

## 2024-10-23 DIAGNOSIS — T81.31XD SURGICAL WOUND DEHISCENCE, SUBSEQUENT ENCOUNTER: Primary | ICD-10-CM

## 2024-10-23 LAB
ERYTHROCYTE [DISTWIDTH] IN BLOOD BY AUTOMATED COUNT: 15.5 % (ref 11.5–14.5)
FERRITIN SERPL-MCNC: 10.7 NG/ML (ref 20–300)
HCT VFR BLD AUTO: 39.5 % (ref 37–48.5)
HGB BLD-MCNC: 13 G/DL (ref 12–16)
MCH RBC QN AUTO: 28.3 PG (ref 27–31)
MCHC RBC AUTO-ENTMCNC: 32.9 G/DL (ref 32–36)
MCV RBC AUTO: 86 FL (ref 82–98)
PLATELET # BLD AUTO: 209 K/UL (ref 150–450)
PMV BLD AUTO: 8.8 FL (ref 9.2–12.9)
RBC # BLD AUTO: 4.59 M/UL (ref 4–5.4)
WBC # BLD AUTO: 4.67 K/UL (ref 3.9–12.7)

## 2024-10-23 PROCEDURE — 1160F RVW MEDS BY RX/DR IN RCRD: CPT | Mod: CPTII,S$GLB,, | Performed by: NURSE PRACTITIONER

## 2024-10-23 PROCEDURE — 99215 OFFICE O/P EST HI 40 MIN: CPT | Mod: S$GLB,,, | Performed by: NURSE PRACTITIONER

## 2024-10-23 PROCEDURE — 3077F SYST BP >= 140 MM HG: CPT | Mod: CPTII,S$GLB,, | Performed by: NURSE PRACTITIONER

## 2024-10-23 PROCEDURE — 99213 OFFICE O/P EST LOW 20 MIN: CPT | Mod: PN | Performed by: FAMILY MEDICINE

## 2024-10-23 PROCEDURE — 99999 PR PBB SHADOW E&M-EST. PATIENT-LVL IV: CPT | Mod: PBBFAC,,, | Performed by: NURSE PRACTITIONER

## 2024-10-23 PROCEDURE — 36415 COLL VENOUS BLD VENIPUNCTURE: CPT | Performed by: NURSE PRACTITIONER

## 2024-10-23 PROCEDURE — 82728 ASSAY OF FERRITIN: CPT | Performed by: NURSE PRACTITIONER

## 2024-10-23 PROCEDURE — G2211 COMPLEX E/M VISIT ADD ON: HCPCS | Mod: S$GLB,,, | Performed by: NURSE PRACTITIONER

## 2024-10-23 PROCEDURE — 3078F DIAST BP <80 MM HG: CPT | Mod: CPTII,S$GLB,, | Performed by: NURSE PRACTITIONER

## 2024-10-23 PROCEDURE — 3008F BODY MASS INDEX DOCD: CPT | Mod: CPTII,S$GLB,, | Performed by: NURSE PRACTITIONER

## 2024-10-23 PROCEDURE — 85027 COMPLETE CBC AUTOMATED: CPT | Performed by: NURSE PRACTITIONER

## 2024-10-23 PROCEDURE — 1159F MED LIST DOCD IN RCRD: CPT | Mod: CPTII,S$GLB,, | Performed by: NURSE PRACTITIONER

## 2024-10-23 RX ORDER — TRIAMCINOLONE ACETONIDE 1 MG/G
CREAM TOPICAL 2 TIMES DAILY
Qty: 80 G | Refills: 2 | Status: SHIPPED | OUTPATIENT
Start: 2024-10-23

## 2024-10-23 RX ORDER — HEPARIN 100 UNIT/ML
5 SYRINGE INTRAVENOUS
OUTPATIENT
Start: 2024-10-23

## 2024-10-23 RX ORDER — EPINEPHRINE 0.3 MG/.3ML
0.3 INJECTION SUBCUTANEOUS ONCE AS NEEDED
OUTPATIENT
Start: 2024-10-23

## 2024-10-23 RX ORDER — DIPHENHYDRAMINE HYDROCHLORIDE 50 MG/ML
50 INJECTION INTRAMUSCULAR; INTRAVENOUS ONCE AS NEEDED
OUTPATIENT
Start: 2024-10-23

## 2024-10-23 RX ORDER — SODIUM CHLORIDE 0.9 % (FLUSH) 0.9 %
10 SYRINGE (ML) INJECTION
OUTPATIENT
Start: 2024-10-23

## 2024-10-23 NOTE — PROGRESS NOTES
Wound Care Progress Note    Subjective:       Patient ID: Paula Hernadez is a 64 y.o. female.    Chief Complaint: Wound Care    HPI  Pt seen in clinic for a FU visit for a left breast surgical wound. Wound continues to improve, no new complaints today  Review of Systems   Skin:  Positive for wound.        Left breast surgical wound   All other systems reviewed and are negative.      Objective:        Physical Exam  Vitals and nursing note reviewed. Exam conducted with a chaperone present.   Constitutional:       General: She is not in acute distress.     Appearance: Normal appearance.   Skin:     General: Skin is warm and dry.      Findings: Lesion present.      Comments: Left breast surgical wound, see wound care assessment documentation in chart review scanned under the media tab   Neurological:      General: No focal deficit present.   Psychiatric:         Mood and Affect: Mood normal.         Judgment: Judgment normal.       Vitals:    10/23/24 1154   BP: 134/72   Pulse: 79   Resp: 18   Temp: 97.9 °F (36.6 °C)       Assessment:           ICD-10-CM ICD-9-CM   1. Surgical wound dehiscence, subsequent encounter  T81.31XD V58.89     998.32   2. Open wound of left breast, subsequent encounter  S21.002D V58.89     879.0   3. Skin graft disorder  T86.829 996.79                Plan:                  Paula was seen today for wound care.    Diagnoses and all orders for this visit:    Surgical wound dehiscence, subsequent encounter    Open wound of left breast, subsequent encounter    Skin graft disorder        Much of the documentation for this visit was completed in the Wound Docs system.  Please see the attached documentation for further details about the patient's care. Scanned under the Media tab.

## 2024-10-23 NOTE — ASSESSMENT & PLAN NOTE
Patient still healing from Breast surgery and doing wound care. Will move mammogram and US to Mid-Dec

## 2024-10-23 NOTE — TELEPHONE ENCOUNTER
Called patient on regard to laboratory work results. Per Aminah Esqudea NP patient's iron is low and Injectafer 2 dosis are recommended. Patient stated understanding.

## 2024-10-24 ENCOUNTER — PATIENT MESSAGE (OUTPATIENT)
Facility: CLINIC | Age: 64
End: 2024-10-24
Payer: COMMERCIAL

## 2024-10-28 ENCOUNTER — PATIENT MESSAGE (OUTPATIENT)
Facility: CLINIC | Age: 64
End: 2024-10-28
Payer: COMMERCIAL

## 2024-10-30 ENCOUNTER — OFFICE VISIT (OUTPATIENT)
Dept: PODIATRY | Facility: CLINIC | Age: 64
End: 2024-10-30
Payer: COMMERCIAL

## 2024-10-30 ENCOUNTER — INFUSION (OUTPATIENT)
Dept: INFUSION THERAPY | Facility: HOSPITAL | Age: 64
End: 2024-10-30
Attending: INTERNAL MEDICINE
Payer: COMMERCIAL

## 2024-10-30 ENCOUNTER — OFFICE VISIT (OUTPATIENT)
Dept: WOUND CARE | Facility: HOSPITAL | Age: 64
End: 2024-10-30
Attending: FAMILY MEDICINE
Payer: COMMERCIAL

## 2024-10-30 VITALS — WEIGHT: 211.19 LBS | HEIGHT: 68 IN | BODY MASS INDEX: 32.01 KG/M2

## 2024-10-30 VITALS
SYSTOLIC BLOOD PRESSURE: 141 MMHG | WEIGHT: 211.19 LBS | TEMPERATURE: 98 F | HEIGHT: 68 IN | OXYGEN SATURATION: 96 % | BODY MASS INDEX: 32.01 KG/M2 | HEART RATE: 77 BPM | DIASTOLIC BLOOD PRESSURE: 63 MMHG | RESPIRATION RATE: 16 BRPM

## 2024-10-30 VITALS
SYSTOLIC BLOOD PRESSURE: 130 MMHG | TEMPERATURE: 98 F | RESPIRATION RATE: 16 BRPM | HEART RATE: 80 BPM | DIASTOLIC BLOOD PRESSURE: 70 MMHG

## 2024-10-30 DIAGNOSIS — D05.12 DUCTAL CARCINOMA IN SITU (DCIS) OF LEFT BREAST: Primary | ICD-10-CM

## 2024-10-30 DIAGNOSIS — B35.1 FUNGAL NAIL INFECTION: Primary | ICD-10-CM

## 2024-10-30 DIAGNOSIS — M79.609 PAIN DUE TO ONYCHOMYCOSIS OF NAIL: ICD-10-CM

## 2024-10-30 DIAGNOSIS — S21.002D OPEN WOUND OF LEFT BREAST, SUBSEQUENT ENCOUNTER: ICD-10-CM

## 2024-10-30 DIAGNOSIS — D50.9 IRON DEFICIENCY ANEMIA, UNSPECIFIED IRON DEFICIENCY ANEMIA TYPE: ICD-10-CM

## 2024-10-30 DIAGNOSIS — T81.31XD SURGICAL WOUND DEHISCENCE, SUBSEQUENT ENCOUNTER: Primary | ICD-10-CM

## 2024-10-30 DIAGNOSIS — B35.1 NAIL FUNGUS: ICD-10-CM

## 2024-10-30 DIAGNOSIS — B35.1 PAIN DUE TO ONYCHOMYCOSIS OF NAIL: ICD-10-CM

## 2024-10-30 DIAGNOSIS — B35.1 ONYCHOMYCOSIS DUE TO DERMATOPHYTE: ICD-10-CM

## 2024-10-30 PROCEDURE — 25000003 PHARM REV CODE 250: Performed by: NURSE PRACTITIONER

## 2024-10-30 PROCEDURE — 1159F MED LIST DOCD IN RCRD: CPT | Mod: CPTII,S$GLB,,

## 2024-10-30 PROCEDURE — A4216 STERILE WATER/SALINE, 10 ML: HCPCS | Performed by: NURSE PRACTITIONER

## 2024-10-30 PROCEDURE — 1160F RVW MEDS BY RX/DR IN RCRD: CPT | Mod: CPTII,S$GLB,,

## 2024-10-30 PROCEDURE — 63600175 PHARM REV CODE 636 W HCPCS: Mod: JZ,JG | Performed by: NURSE PRACTITIONER

## 2024-10-30 PROCEDURE — 99204 OFFICE O/P NEW MOD 45 MIN: CPT | Mod: S$GLB,,,

## 2024-10-30 PROCEDURE — 3008F BODY MASS INDEX DOCD: CPT | Mod: CPTII,S$GLB,,

## 2024-10-30 PROCEDURE — 96365 THER/PROPH/DIAG IV INF INIT: CPT

## 2024-10-30 PROCEDURE — 99999 PR PBB SHADOW E&M-EST. PATIENT-LVL III: CPT | Mod: PBBFAC,,,

## 2024-10-30 PROCEDURE — 99214 OFFICE O/P EST MOD 30 MIN: CPT | Mod: PN | Performed by: FAMILY MEDICINE

## 2024-10-30 RX ORDER — DIPHENHYDRAMINE HYDROCHLORIDE 50 MG/ML
50 INJECTION INTRAMUSCULAR; INTRAVENOUS ONCE AS NEEDED
OUTPATIENT
Start: 2024-11-06

## 2024-10-30 RX ORDER — SODIUM CHLORIDE 0.9 % (FLUSH) 0.9 %
10 SYRINGE (ML) INJECTION
OUTPATIENT
Start: 2024-11-06

## 2024-10-30 RX ORDER — HEPARIN 100 UNIT/ML
5 SYRINGE INTRAVENOUS
OUTPATIENT
Start: 2024-11-06

## 2024-10-30 RX ORDER — EPINEPHRINE 0.3 MG/.3ML
0.3 INJECTION SUBCUTANEOUS ONCE AS NEEDED
OUTPATIENT
Start: 2024-11-06

## 2024-10-30 RX ORDER — HEPARIN 100 UNIT/ML
5 SYRINGE INTRAVENOUS
Status: DISCONTINUED | OUTPATIENT
Start: 2024-10-30 | End: 2024-10-30 | Stop reason: HOSPADM

## 2024-10-30 RX ORDER — SODIUM CHLORIDE 0.9 % (FLUSH) 0.9 %
10 SYRINGE (ML) INJECTION
Status: DISCONTINUED | OUTPATIENT
Start: 2024-10-30 | End: 2024-10-30 | Stop reason: HOSPADM

## 2024-10-30 RX ORDER — FLUCONAZOLE 200 MG/1
200 TABLET ORAL WEEKLY
Qty: 28 TABLET | Refills: 0 | Status: SHIPPED | OUTPATIENT
Start: 2024-10-30 | End: 2025-05-08

## 2024-10-30 RX ADMIN — FERRIC CARBOXYMALTOSE INJECTION 750 MG: 50 INJECTION, SOLUTION INTRAVENOUS at 01:10

## 2024-10-30 RX ADMIN — Medication 10 ML: at 01:10

## 2024-11-03 ENCOUNTER — HOSPITAL ENCOUNTER (INPATIENT)
Facility: HOSPITAL | Age: 64
LOS: 3 days | Discharge: HOME | DRG: 863 | End: 2024-11-06
Attending: EMERGENCY MEDICINE | Admitting: HOSPITALIST
Payer: COMMERCIAL

## 2024-11-03 DIAGNOSIS — R07.9 CHEST PAIN: ICD-10-CM

## 2024-11-03 DIAGNOSIS — N61.0 CELLULITIS OF LEFT BREAST: Primary | ICD-10-CM

## 2024-11-03 PROBLEM — B35.1 ONYCHOMYCOSIS: Status: ACTIVE | Noted: 2024-11-03

## 2024-11-03 PROBLEM — K21.9 GASTROESOPHAGEAL REFLUX DISEASE WITHOUT ESOPHAGITIS: Status: ACTIVE | Noted: 2024-11-03

## 2024-11-03 LAB
ALBUMIN SERPL BCP-MCNC: 3.8 G/DL (ref 3.5–5.2)
ALP SERPL-CCNC: 90 U/L (ref 55–135)
ALT SERPL W/O P-5'-P-CCNC: 13 U/L (ref 10–44)
ANION GAP SERPL CALC-SCNC: 2 MMOL/L (ref 8–16)
AST SERPL-CCNC: 21 U/L (ref 10–40)
BASOPHILS # BLD AUTO: 0.01 K/UL (ref 0–0.2)
BASOPHILS NFR BLD: 0.3 % (ref 0–1.9)
BILIRUB SERPL-MCNC: 0.5 MG/DL (ref 0.1–1)
BILIRUB UR QL STRIP: NEGATIVE
BUN SERPL-MCNC: 14 MG/DL (ref 8–23)
CALCIUM SERPL-MCNC: 8.7 MG/DL (ref 8.7–10.5)
CHLORIDE SERPL-SCNC: 107 MMOL/L (ref 95–110)
CLARITY UR: CLEAR
CO2 SERPL-SCNC: 31 MMOL/L (ref 23–29)
COLOR UR: YELLOW
CREAT SERPL-MCNC: 0.6 MG/DL (ref 0.5–1.4)
DIFFERENTIAL METHOD BLD: ABNORMAL
EOSINOPHIL # BLD AUTO: 0.1 K/UL (ref 0–0.5)
EOSINOPHIL NFR BLD: 3.3 % (ref 0–8)
ERYTHROCYTE [DISTWIDTH] IN BLOOD BY AUTOMATED COUNT: 15.6 % (ref 11.5–14.5)
EST. GFR  (NO RACE VARIABLE): >60 ML/MIN/1.73 M^2
GLUCOSE SERPL-MCNC: 76 MG/DL (ref 70–110)
GLUCOSE UR QL STRIP: NEGATIVE
HCT VFR BLD AUTO: 36.2 % (ref 37–48.5)
HGB BLD-MCNC: 11.5 G/DL (ref 12–16)
HGB UR QL STRIP: NEGATIVE
IMM GRANULOCYTES # BLD AUTO: 0.01 K/UL (ref 0–0.04)
IMM GRANULOCYTES NFR BLD AUTO: 0.3 % (ref 0–0.5)
KETONES UR QL STRIP: NEGATIVE
LDH SERPL L TO P-CCNC: 0.62 MMOL/L (ref 0.5–2.2)
LEUKOCYTE ESTERASE UR QL STRIP: NEGATIVE
LYMPHOCYTES # BLD AUTO: 1 K/UL (ref 1–4.8)
LYMPHOCYTES NFR BLD: 26.1 % (ref 18–48)
MCH RBC QN AUTO: 27.9 PG (ref 27–31)
MCHC RBC AUTO-ENTMCNC: 31.8 G/DL (ref 32–36)
MCV RBC AUTO: 88 FL (ref 82–98)
MONOCYTES # BLD AUTO: 0.5 K/UL (ref 0.3–1)
MONOCYTES NFR BLD: 11.9 % (ref 4–15)
NEUTROPHILS # BLD AUTO: 2.3 K/UL (ref 1.8–7.7)
NEUTROPHILS NFR BLD: 58.1 % (ref 38–73)
NITRITE UR QL STRIP: NEGATIVE
NRBC BLD-RTO: 0 /100 WBC
PH UR STRIP: 7 [PH] (ref 5–8)
PLATELET # BLD AUTO: 169 K/UL (ref 150–450)
PMV BLD AUTO: 9 FL (ref 9.2–12.9)
POTASSIUM SERPL-SCNC: 4.1 MMOL/L (ref 3.5–5.1)
PROCALCITONIN SERPL IA-MCNC: 0.07 NG/ML (ref 0–0.5)
PROT SERPL-MCNC: 6.5 G/DL (ref 6–8.4)
PROT UR QL STRIP: NEGATIVE
RBC # BLD AUTO: 4.12 M/UL (ref 4–5.4)
SAMPLE: NORMAL
SODIUM SERPL-SCNC: 140 MMOL/L (ref 136–145)
SP GR UR STRIP: 1.01 (ref 1–1.03)
URN SPEC COLLECT METH UR: NORMAL
UROBILINOGEN UR STRIP-ACNC: NEGATIVE EU/DL
WBC # BLD AUTO: 3.95 K/UL (ref 3.9–12.7)

## 2024-11-03 PROCEDURE — 25000003 PHARM REV CODE 250: Performed by: EMERGENCY MEDICINE

## 2024-11-03 PROCEDURE — 63600175 PHARM REV CODE 636 W HCPCS: Performed by: EMERGENCY MEDICINE

## 2024-11-03 PROCEDURE — 84145 PROCALCITONIN (PCT): CPT | Performed by: EMERGENCY MEDICINE

## 2024-11-03 PROCEDURE — 12000002 HC ACUTE/MED SURGE SEMI-PRIVATE ROOM

## 2024-11-03 PROCEDURE — 87205 SMEAR GRAM STAIN: CPT | Performed by: INTERNAL MEDICINE

## 2024-11-03 PROCEDURE — 63600175 PHARM REV CODE 636 W HCPCS: Performed by: HOSPITALIST

## 2024-11-03 PROCEDURE — 87186 SC STD MICRODIL/AGAR DIL: CPT | Performed by: INTERNAL MEDICINE

## 2024-11-03 PROCEDURE — 25000003 PHARM REV CODE 250: Performed by: HOSPITALIST

## 2024-11-03 PROCEDURE — 87070 CULTURE OTHR SPECIMN AEROBIC: CPT | Performed by: INTERNAL MEDICINE

## 2024-11-03 PROCEDURE — 96365 THER/PROPH/DIAG IV INF INIT: CPT

## 2024-11-03 PROCEDURE — 96375 TX/PRO/DX INJ NEW DRUG ADDON: CPT

## 2024-11-03 PROCEDURE — 85025 COMPLETE CBC W/AUTO DIFF WBC: CPT | Performed by: EMERGENCY MEDICINE

## 2024-11-03 PROCEDURE — 99285 EMERGENCY DEPT VISIT HI MDM: CPT | Mod: 25

## 2024-11-03 PROCEDURE — 80053 COMPREHEN METABOLIC PANEL: CPT | Performed by: EMERGENCY MEDICINE

## 2024-11-03 PROCEDURE — 36415 COLL VENOUS BLD VENIPUNCTURE: CPT | Performed by: EMERGENCY MEDICINE

## 2024-11-03 PROCEDURE — 25500020 PHARM REV CODE 255: Performed by: EMERGENCY MEDICINE

## 2024-11-03 PROCEDURE — 81003 URINALYSIS AUTO W/O SCOPE: CPT | Performed by: EMERGENCY MEDICINE

## 2024-11-03 PROCEDURE — 87040 BLOOD CULTURE FOR BACTERIA: CPT | Performed by: EMERGENCY MEDICINE

## 2024-11-03 PROCEDURE — 99223 1ST HOSP IP/OBS HIGH 75: CPT | Mod: ,,, | Performed by: INTERNAL MEDICINE

## 2024-11-03 PROCEDURE — 96366 THER/PROPH/DIAG IV INF ADDON: CPT

## 2024-11-03 RX ORDER — IBUPROFEN 200 MG
16 TABLET ORAL
Status: DISCONTINUED | OUTPATIENT
Start: 2024-11-03 | End: 2024-11-06 | Stop reason: HOSPADM

## 2024-11-03 RX ORDER — ACETAMINOPHEN 325 MG/1
650 TABLET ORAL EVERY 4 HOURS PRN
Status: DISCONTINUED | OUTPATIENT
Start: 2024-11-03 | End: 2024-11-06 | Stop reason: HOSPADM

## 2024-11-03 RX ORDER — PANTOPRAZOLE SODIUM 40 MG/1
40 TABLET, DELAYED RELEASE ORAL DAILY
Status: DISCONTINUED | OUTPATIENT
Start: 2024-11-03 | End: 2024-11-06 | Stop reason: HOSPADM

## 2024-11-03 RX ORDER — VENLAFAXINE HYDROCHLORIDE 37.5 MG/1
37.5 CAPSULE, EXTENDED RELEASE ORAL DAILY
Status: DISCONTINUED | OUTPATIENT
Start: 2024-11-03 | End: 2024-11-06 | Stop reason: HOSPADM

## 2024-11-03 RX ORDER — LANOLIN ALCOHOL/MO/W.PET/CERES
800 CREAM (GRAM) TOPICAL
Status: DISCONTINUED | OUTPATIENT
Start: 2024-11-03 | End: 2024-11-06 | Stop reason: HOSPADM

## 2024-11-03 RX ORDER — SODIUM,POTASSIUM PHOSPHATES 280-250MG
2 POWDER IN PACKET (EA) ORAL
Status: DISCONTINUED | OUTPATIENT
Start: 2024-11-03 | End: 2024-11-06 | Stop reason: HOSPADM

## 2024-11-03 RX ORDER — ONDANSETRON HYDROCHLORIDE 2 MG/ML
4 INJECTION, SOLUTION INTRAVENOUS EVERY 6 HOURS PRN
Status: DISCONTINUED | OUTPATIENT
Start: 2024-11-03 | End: 2024-11-06 | Stop reason: HOSPADM

## 2024-11-03 RX ORDER — HYDROCODONE BITARTRATE AND ACETAMINOPHEN 5; 325 MG/1; MG/1
1 TABLET ORAL EVERY 6 HOURS PRN
Status: DISCONTINUED | OUTPATIENT
Start: 2024-11-03 | End: 2024-11-06 | Stop reason: HOSPADM

## 2024-11-03 RX ORDER — AMOXICILLIN 250 MG
1 CAPSULE ORAL 2 TIMES DAILY PRN
Status: DISCONTINUED | OUTPATIENT
Start: 2024-11-03 | End: 2024-11-06 | Stop reason: HOSPADM

## 2024-11-03 RX ORDER — GLUCAGON 1 MG
1 KIT INJECTION
Status: DISCONTINUED | OUTPATIENT
Start: 2024-11-03 | End: 2024-11-06 | Stop reason: HOSPADM

## 2024-11-03 RX ORDER — IBUPROFEN 200 MG
24 TABLET ORAL
Status: DISCONTINUED | OUTPATIENT
Start: 2024-11-03 | End: 2024-11-06 | Stop reason: HOSPADM

## 2024-11-03 RX ORDER — ALUMINUM HYDROXIDE, MAGNESIUM HYDROXIDE, AND SIMETHICONE 1200; 120; 1200 MG/30ML; MG/30ML; MG/30ML
30 SUSPENSION ORAL 4 TIMES DAILY PRN
Status: DISCONTINUED | OUTPATIENT
Start: 2024-11-03 | End: 2024-11-06 | Stop reason: HOSPADM

## 2024-11-03 RX ORDER — ENOXAPARIN SODIUM 100 MG/ML
40 INJECTION SUBCUTANEOUS EVERY 24 HOURS
Status: DISCONTINUED | OUTPATIENT
Start: 2024-11-03 | End: 2024-11-06 | Stop reason: HOSPADM

## 2024-11-03 RX ORDER — TALC
6 POWDER (GRAM) TOPICAL NIGHTLY PRN
Status: DISCONTINUED | OUTPATIENT
Start: 2024-11-03 | End: 2024-11-06 | Stop reason: HOSPADM

## 2024-11-03 RX ORDER — SODIUM CHLORIDE 0.9 % (FLUSH) 0.9 %
2 SYRINGE (ML) INJECTION EVERY 12 HOURS PRN
Status: DISCONTINUED | OUTPATIENT
Start: 2024-11-03 | End: 2024-11-06 | Stop reason: HOSPADM

## 2024-11-03 RX ORDER — NALOXONE HCL 0.4 MG/ML
0.02 VIAL (ML) INJECTION
Status: DISCONTINUED | OUTPATIENT
Start: 2024-11-03 | End: 2024-11-06 | Stop reason: HOSPADM

## 2024-11-03 RX ORDER — FLUCONAZOLE 100 MG/1
200 TABLET ORAL WEEKLY
Status: DISCONTINUED | OUTPATIENT
Start: 2024-11-06 | End: 2024-11-06 | Stop reason: HOSPADM

## 2024-11-03 RX ORDER — CEFTRIAXONE 2 G/1
2 INJECTION, POWDER, FOR SOLUTION INTRAMUSCULAR; INTRAVENOUS
Status: COMPLETED | OUTPATIENT
Start: 2024-11-03 | End: 2024-11-03

## 2024-11-03 RX ORDER — CEFTRIAXONE 2 G/1
2 INJECTION, POWDER, FOR SOLUTION INTRAMUSCULAR; INTRAVENOUS
Status: DISCONTINUED | OUTPATIENT
Start: 2024-11-04 | End: 2024-11-06 | Stop reason: HOSPADM

## 2024-11-03 RX ORDER — ACETAMINOPHEN 325 MG/1
650 TABLET ORAL EVERY 8 HOURS PRN
Status: DISCONTINUED | OUTPATIENT
Start: 2024-11-03 | End: 2024-11-06 | Stop reason: HOSPADM

## 2024-11-03 RX ADMIN — VANCOMYCIN HYDROCHLORIDE 1750 MG: 5 INJECTION, POWDER, LYOPHILIZED, FOR SOLUTION INTRAVENOUS at 11:11

## 2024-11-03 RX ADMIN — Medication 6 MG: at 09:11

## 2024-11-03 RX ADMIN — ACETAMINOPHEN 650 MG: 325 TABLET ORAL at 02:11

## 2024-11-03 RX ADMIN — VENLAFAXINE HYDROCHLORIDE 37.5 MG: 37.5 CAPSULE, EXTENDED RELEASE ORAL at 03:11

## 2024-11-03 RX ADMIN — IOHEXOL 100 ML: 350 INJECTION, SOLUTION INTRAVENOUS at 10:11

## 2024-11-03 RX ADMIN — VANCOMYCIN HYDROCHLORIDE 1750 MG: 500 INJECTION, POWDER, LYOPHILIZED, FOR SOLUTION INTRAVENOUS at 11:11

## 2024-11-03 RX ADMIN — PANTOPRAZOLE SODIUM 40 MG: 40 TABLET, DELAYED RELEASE ORAL at 03:11

## 2024-11-03 RX ADMIN — ENOXAPARIN SODIUM 40 MG: 40 INJECTION SUBCUTANEOUS at 03:11

## 2024-11-03 RX ADMIN — CEFTRIAXONE SODIUM 2 G: 2 INJECTION, POWDER, FOR SOLUTION INTRAMUSCULAR; INTRAVENOUS at 10:11

## 2024-11-03 NOTE — NURSING
Dr Álvarez at bedside, new verbal orders received for aerobic and gram stain cultures of left breast wound

## 2024-11-03 NOTE — ASSESSMENT & PLAN NOTE
- Extensive cellulitis of the left breast. Suspect the source of infection to be the non healing wound at one of the prior surgical site.  - CT chest without contrast showed Left breast soft tissue gas and edema, with no findings of soft tissue abscess.  Case was discussed with the ER provider in detail.  Suspicion that the gas is coming from the non heading wound at the surgical side, and not because of necrotizing fasciitis.  - Patient was started on vanc and ceftriaxone which I will continue.  - Will consult Infectious Disease.

## 2024-11-03 NOTE — ASSESSMENT & PLAN NOTE
- Extensive cellulitis of the left breast. Suspect the source of infection to be the non healing wound at one of the prior surgical site.  - CT chest without contrast showed Left breast soft tissue gas and edema, with no findings of soft tissue abscess.  Case was discussed with the ER provider in detail.  Suspicion that the gas is coming from the non heading wound at the surgical side, and not because of necrotizing fasciitis.  - Patient was started on vanc and ceftriaxone which I will continue.  - ID has seen patient and made recommendations.  - Wound care following.

## 2024-11-03 NOTE — H&P
[facility] - Emergency Dept  Hospital Medicine  History & Physical    Patient Name: Paula Hernadez  MRN: [id number]  Patient Class: IP- Inpatient  Admission Date: 11/3/2024  Attending Physician: Cece Kincaid MD  Primary Care Provider: Harpreet Gonsalez Jr., MD         Patient information was obtained from patient and ER records.     Subjective:     Principal Problem:Cellulitis of left breast    Chief Complaint:   Chief Complaint   Patient presents with    Wound Check     Patient had lumpectomy 10/2022 then multiple surgeries this year - was changing the dressing yesterday and found that wound is now red and swollen and warm to touch, denies drainage - patient is not on ABX but would like the wound checked.         HPI: 64-year-old female with PMH of breast cancer status post lumpectomy in October of 2021, followed by radiation who presented to the ER bec of left breast cellulitis. Per pt, she underwent reconstruction surgery of her left breast in April of 2024, but the surgical site never healed due to radiation.  At that time, the plastic surgeon decided to proceed with a skin graft which was done in June of 2024, but this was met with a rejection.  A 2nd skin graft was done on July 20, 2024, but again 50% of the skin graft got rejected.  At that time, plastic surgeon recommended a 3rd skin graft, but patient refused and requested wound care instead.  She was doing fine with the wound care, wound was healing except for a hole underneath the left breast.  Yesterday after having a shower, she noted redness, and swelling of the left breast and it was warm to touch.  Today she woke up with worsening symptoms, so she decided to come to the ER.  She denied any fever or chills.    In the ER, vitals showed blood pressure of 120/58, heart rate of 74, respiratory rate of 18, afebrile satting 98% on room air.  CBC showed normocytic anemia with hemoglobin of 11.5.  CBC is within normal limits.  Procalcitonin  is within normal limits.  UA is negative.  Blood cultures were collected.  Given the extent of the cellulitis in the swelling of her left breast, CT chest with IV contrast was done which showed Left breast soft tissue gas and edema, with no findings of soft tissue abscess. Pt was given ceftriaxone and vanco. Pt will be admitted to medicine for further care.    Past Medical History:   Diagnosis Date    Arthritis     Breast cancer     Chronic constipation     Chronic insomnia     Floaters in visual field     GERD (gastroesophageal reflux disease)     Seasonal allergies     Seborrheic keratoses     Thumb pain 2018       Past Surgical History:   Procedure Laterality Date    ABDOMINOPLASTY      BREAST BIOPSY      BREAST MASS EXCISION      COSMETIC SURGERY      tummy tuck    CYST REMOVAL  05/27/2021    chest    INTERPOSITION ARTHROPLASTY OF CARPOMETACARPAL JOINTS Left 06/09/2021    Procedure: INTERPOSITION ARTHROPLASTY, [facility] JOINT;  Surgeon: Giovani Molina MD;  Location: [facility];  Service: Orthopedics;  Laterality: Left;    LASIK Bilateral     LIPOSUCTION W/ FAT INJECTION Bilateral 04/09/2024    Procedure: LIPOSUCTION, WITH FAT TRANSFER;  Surgeon: Yuniel Cadena MD;  Location: [facility];  Service: Plastics;  Laterality: Bilateral;    MASTECTOMY, PARTIAL Left 10/19/2021    Procedure: MASTECTOMY, PARTIAL;  Surgeon: Lila Perales MD;  Location: [facility];  Service: General;  Laterality: Left;    MASTOPEXY Bilateral 04/09/2024    Procedure: MASTOPEXY;  Surgeon: Yuniel Cadena MD;  Location: [facility];  Service: Plastics;  Laterality: Bilateral;    NEEDLE LOCALIZATION Left 10/19/2021    Procedure: NEEDLE LOCALIZATION;  Surgeon: Lila Perales MD;  Location: [facility];  Service: General;  Laterality: Left;  LOCALIZER DONE 10/15    SHOULDER SURGERY Left     bone spur    SKIN FULL THICKNESS GRAFT Bilateral 6/18/2024    Procedure: APPLICATION, GRAFT, SKIN, FULL-THICKNESS;  Surgeon: Yuniel Cadena MD;  Location: [facility];  Service:  Plastics;  Laterality: Bilateral;    THUMB ARTHROSCOPY Left     TONSILLECTOMY         Review of patient's allergies indicates:   Allergen Reactions    Codeine Nausea Only       No current facility-administered medications on file prior to encounter.     Current Outpatient Medications on File Prior to Encounter   Medication Sig    anastrozole (ARIMIDEX) 1 mg Tab Take 1 tablet (1 mg total) by mouth once daily.    omeprazole (PRILOSEC) 20 MG capsule Take 20 mg by mouth once daily.     venlafaxine (EFFEXOR-XR) 37.5 MG 24 hr capsule Take 1 capsule (37.5 mg total) by mouth once daily. (Patient taking differently: Take 75 mg by mouth every evening.)    acetaminophen (TYLENOL) 500 MG tablet Take 1,000 mg by mouth every 6 (six) hours as needed for Pain.    docusate sodium (STOOL SOFTENER) 100 mg capsule Take 100 mg by mouth 2 (two) times daily.    fluconazole (DIFLUCAN) 200 MG Tab Take 1 tablet (200 mg total) by mouth once a week. for 28 doses    triamcinolone acetonide 0.1% (KENALOG) 0.1 % cream Apply topically 2 (two) times daily.     Family History       Problem Relation (Age of Onset)    Prostate cancer Father    Stroke Mother          Tobacco Use    Smoking status: Former     Current packs/day: 0.00     Average packs/day: 0.3 packs/day for 27.8 years (6.9 ttl pk-yrs)     Types: Cigarettes     Start date:      Quit date: 10/6/2002     Years since quittin.0    Smokeless tobacco: Never   Substance and Sexual Activity    Alcohol use: Yes     Alcohol/week: 4.0 standard drinks of alcohol     Types: 4 Standard drinks or equivalent per week     Comment: daiquiris, weekly    Drug use: Yes     Frequency: 2.0 times per week     Types: Marijuana     Comment: thc gummies    Sexual activity: Yes     Partners: Female     Birth control/protection: Post-menopausal     Review of Systems   Constitutional:  Negative for chills and fever.   HENT:  Negative for sore throat.    Eyes:  Negative for visual disturbance.   Respiratory:   Negative for cough and shortness of breath.    Cardiovascular:  Negative for chest pain and palpitations.   Gastrointestinal:  Negative for abdominal pain, nausea and vomiting.   Endocrine: Negative for polydipsia and polyuria.   Genitourinary:  Negative for dysuria, frequency and urgency.   Skin:         Significant cellulitis overlying the left breast.    Neurological:  Negative for syncope.   Psychiatric/Behavioral:  Negative for confusion.      Objective:     Vital Signs (Most Recent):  Temp: 98 °F (36.7 °C) (11/03/24 1400)  Pulse: 72 (11/03/24 1400)  Resp: 18 (11/03/24 0842)  BP: (!) 125/58 (11/03/24 1400)  SpO2: 98 % (11/03/24 1400) Vital Signs (24h Range):  Temp:  [97.7 °F (36.5 °C)-98 °F (36.7 °C)] 98 °F (36.7 °C)  Pulse:  [64-74] 72  Resp:  [18] 18  SpO2:  [98 %-100 %] 98 %  BP: (120-145)/(58-68) 125/58     Weight: 93 kg (205 lb)  Body mass index is 31.17 kg/m².     Physical Exam  Vitals reviewed.   Constitutional:       Appearance: Normal appearance.   HENT:      Head: Normocephalic and atraumatic.      Mouth/Throat:      Mouth: Mucous membranes are moist.   Eyes:      Extraocular Movements: Extraocular movements intact.      Conjunctiva/sclera: Conjunctivae normal.      Pupils: Pupils are equal, round, and reactive to light.   Cardiovascular:      Rate and Rhythm: Normal rate and regular rhythm.   Pulmonary:      Effort: Pulmonary effort is normal. No respiratory distress.      Breath sounds: Normal breath sounds.   Abdominal:      General: Abdomen is flat. Bowel sounds are normal. There is no distension.      Palpations: Abdomen is soft.      Tenderness: There is no abdominal tenderness.   Musculoskeletal:         General: No swelling or tenderness. Normal range of motion.   Skin:     Comments: Swelling and significant cellulitis overlaying the whole left breast extending to the clavicle, left mid-axillary and substernal regions. Non healing wound noted at one of the prior surgical site underneath her  left breast with pus noted in it.   Neurological:      General: No focal deficit present.      Mental Status: She is alert and oriented to person, place, and time.   Psychiatric:         Mood and Affect: Mood normal.         Behavior: Behavior normal.               Significant Labs: All pertinent labs within the past 24 hours have been reviewed.    Significant Imaging: I have reviewed all pertinent imaging results/findings within the past 24 hours.  Assessment/Plan:     * Cellulitis of left breast  - Extensive cellulitis of the left breast. Suspect the source of infection to be the non healing wound at one of the prior surgical site.  - CT chest without contrast showed Left breast soft tissue gas and edema, with no findings of soft tissue abscess.  Case was discussed with the ER provider in detail.  Suspicion that the gas is coming from the non heading wound at the surgical side, and not because of necrotizing fasciitis.  - Patient was started on vanc and ceftriaxone which I will continue.  - Will consult Infectious Disease.  - will consult plastic surgery.  - Wound care consult.        Gastroesophageal reflux disease without esophagitis  PPI.      Onychomycosis  Resume fluconazole weekly      Hot flashes  Continue Effexor        VTE Risk Mitigation (From admission, onward)           Ordered     enoxaparin injection 40 mg  Daily         11/03/24 1237     IP VTE HIGH RISK PATIENT  Once         11/03/24 1237     Place sequential compression device  Until discontinued         11/03/24 1237                                    Cece Kincaid MD  Department of Hospital Medicine  [facility] - Emergency Dept

## 2024-11-03 NOTE — HPI
64-year-old female with PMH of breast cancer status post lumpectomy in October of 2021, followed by radiation who presented to the ER bec of left breast cellulitis. Per pt, she underwent reconstruction surgery of her left breast in April of 2024, but the surgical site never healed due to radiation.  At that time, the plastic surgeon decided to proceed with a skin graft which was done in June of 2024, but this was met with a rejection.  A 2nd skin graft was done on July 20, 2024, but again 50% of the skin graft got rejected.  At that time, plastic surgeon recommended a 3rd skin graft, but patient refused and requested wound care instead.  She was doing fine with the wound care, wound was healing except for a hole underneath the left breast.  Yesterday after having a shower, she noted redness, and swelling of the left breast and it was warm to touch.  Today she woke up with worsening symptoms, so she decided to come to the ER.  She denied any fever or chills.    In the ER, vitals showed blood pressure of 120/58, heart rate of 74, respiratory rate of 18, afebrile satting 98% on room air.  CBC showed normocytic anemia with hemoglobin of 11.5.  CBC is within normal limits.  Procalcitonin is within normal limits.  UA is negative.  Blood cultures were collected.  Given the extent of the cellulitis in the swelling of her left breast, CT chest with IV contrast was done which showed Left breast soft tissue gas and edema, with no findings of soft tissue abscess. Pt was given ceftriaxone and vanco. Pt will be admitted to medicine for further care.

## 2024-11-03 NOTE — SUBJECTIVE & OBJECTIVE
Past Medical History:   Diagnosis Date    Arthritis     Breast cancer     Chronic constipation     Chronic insomnia     Floaters in visual field     GERD (gastroesophageal reflux disease)     Seasonal allergies     Seborrheic keratoses     Thumb pain 2018       Past Surgical History:   Procedure Laterality Date    ABDOMINOPLASTY      BREAST BIOPSY      BREAST MASS EXCISION      COSMETIC SURGERY      tummy tuck    CYST REMOVAL  05/27/2021    chest    INTERPOSITION ARTHROPLASTY OF CARPOMETACARPAL JOINTS Left 06/09/2021    Procedure: INTERPOSITION ARTHROPLASTY, CMC JOINT;  Surgeon: Giovani Molina MD;  Location: [facility] OR;  Service: Orthopedics;  Laterality: Left;    LASIK Bilateral     LIPOSUCTION W/ FAT INJECTION Bilateral 04/09/2024    Procedure: LIPOSUCTION, WITH FAT TRANSFER;  Surgeon: Yuniel Cadena MD;  Location: [facility] OR;  Service: Plastics;  Laterality: Bilateral;    MASTECTOMY, PARTIAL Left 10/19/2021    Procedure: MASTECTOMY, PARTIAL;  Surgeon: Lila Perales MD;  Location: [facility] OR;  Service: General;  Laterality: Left;    MASTOPEXY Bilateral 04/09/2024    Procedure: MASTOPEXY;  Surgeon: Yuniel Cadena MD;  Location: [facility] OR;  Service: Plastics;  Laterality: Bilateral;    NEEDLE LOCALIZATION Left 10/19/2021    Procedure: NEEDLE LOCALIZATION;  Surgeon: Lila ePrales MD;  Location: [facility] OR;  Service: General;  Laterality: Left;  LOCALIZER DONE 10/15    SHOULDER SURGERY Left     bone spur    SKIN FULL THICKNESS GRAFT Bilateral 6/18/2024    Procedure: APPLICATION, GRAFT, SKIN, FULL-THICKNESS;  Surgeon: Yuniel Cadena MD;  Location: [facility];  Service: Plastics;  Laterality: Bilateral;    THUMB ARTHROSCOPY Left     TONSILLECTOMY         Review of patient's allergies indicates:   Allergen Reactions    Codeine Nausea Only       No current facility-administered medications on file prior to encounter.     Current Outpatient Medications on File Prior to Encounter   Medication Sig    anastrozole (ARIMIDEX) 1 mg Tab  Take 1 tablet (1 mg total) by mouth once daily.    omeprazole (PRILOSEC) 20 MG capsule Take 20 mg by mouth once daily.     venlafaxine (EFFEXOR-XR) 37.5 MG 24 hr capsule Take 1 capsule (37.5 mg total) by mouth once daily. (Patient taking differently: Take 75 mg by mouth every evening.)    acetaminophen (TYLENOL) 500 MG tablet Take 1,000 mg by mouth every 6 (six) hours as needed for Pain.    docusate sodium (STOOL SOFTENER) 100 mg capsule Take 100 mg by mouth 2 (two) times daily.    fluconazole (DIFLUCAN) 200 MG Tab Take 1 tablet (200 mg total) by mouth once a week. for 28 doses    triamcinolone acetonide 0.1% (KENALOG) 0.1 % cream Apply topically 2 (two) times daily.     Family History       Problem Relation (Age of Onset)    Prostate cancer Father    Stroke Mother          Tobacco Use    Smoking status: Former     Current packs/day: 0.00     Average packs/day: 0.3 packs/day for 27.8 years (6.9 ttl pk-yrs)     Types: Cigarettes     Start date:      Quit date: 10/6/2002     Years since quittin.0    Smokeless tobacco: Never   Substance and Sexual Activity    Alcohol use: Yes     Alcohol/week: 4.0 standard drinks of alcohol     Types: 4 Standard drinks or equivalent per week     Comment: daiquiris, weekly    Drug use: Yes     Frequency: 2.0 times per week     Types: Marijuana     Comment: thc gummies    Sexual activity: Yes     Partners: Female     Birth control/protection: Post-menopausal     Review of Systems   Constitutional:  Negative for chills and fever.   HENT:  Negative for sore throat.    Eyes:  Negative for visual disturbance.   Respiratory:  Negative for cough and shortness of breath.    Cardiovascular:  Negative for chest pain and palpitations.   Gastrointestinal:  Negative for abdominal pain, nausea and vomiting.   Endocrine: Negative for polydipsia and polyuria.   Genitourinary:  Negative for dysuria, frequency and urgency.   Skin:         Significant cellulitis overlying the left breast.     Neurological:  Negative for syncope.   Psychiatric/Behavioral:  Negative for confusion.      Objective:     Vital Signs (Most Recent):  Temp: 98 °F (36.7 °C) (11/03/24 1400)  Pulse: 72 (11/03/24 1400)  Resp: 18 (11/03/24 0842)  BP: (!) 125/58 (11/03/24 1400)  SpO2: 98 % (11/03/24 1400) Vital Signs (24h Range):  Temp:  [97.7 °F (36.5 °C)-98 °F (36.7 °C)] 98 °F (36.7 °C)  Pulse:  [64-74] 72  Resp:  [18] 18  SpO2:  [98 %-100 %] 98 %  BP: (120-145)/(58-68) 125/58     Weight: 93 kg (205 lb)  Body mass index is 31.17 kg/m².     Physical Exam  Vitals reviewed.   Constitutional:       Appearance: Normal appearance.   HENT:      Head: Normocephalic and atraumatic.      Mouth/Throat:      Mouth: Mucous membranes are moist.   Eyes:      Extraocular Movements: Extraocular movements intact.      Conjunctiva/sclera: Conjunctivae normal.      Pupils: Pupils are equal, round, and reactive to light.   Cardiovascular:      Rate and Rhythm: Normal rate and regular rhythm.   Pulmonary:      Effort: Pulmonary effort is normal. No respiratory distress.      Breath sounds: Normal breath sounds.   Abdominal:      General: Abdomen is flat. Bowel sounds are normal. There is no distension.      Palpations: Abdomen is soft.      Tenderness: There is no abdominal tenderness.   Musculoskeletal:         General: No swelling or tenderness. Normal range of motion.   Skin:     Comments: Swelling and significant cellulitis overlaying the whole left breast extending to the clavicle, left mid-axillary and substernal regions. Non healing wound noted at one of the prior surgical site underneath her left breast with pus noted in it.   Neurological:      General: No focal deficit present.      Mental Status: She is alert and oriented to person, place, and time.   Psychiatric:         Mood and Affect: Mood normal.         Behavior: Behavior normal.               Significant Labs: All pertinent labs within the past 24 hours have been  reviewed.    Significant Imaging: I have reviewed all pertinent imaging results/findings within the past 24 hours.

## 2024-11-03 NOTE — PROGRESS NOTES
"Pharmacokinetic Initial Assessment: IV Vancomycin    Assessment/Plan:    Initiate intravenous vancomycin with loading dose of 1750 mg once followed by a maintenance dose of vancomycin 1750 mg IV every 12 hours  Desired empiric serum trough concentration is 10 to 15 mcg/mL  Draw vancomycin trough level 60 min prior to third dose on 11/4/24 at approximately 10:00 am.  Pharmacy will continue to follow and monitor vancomycin.      Please contact pharmacy at extension [phone] with any questions regarding this assessment.     Thank you for the consult,   Onelia Swan       Patient brief summary:  Paula Hernadez is a 64 y.o. female initiated on antimicrobial therapy with IV Vancomycin for treatment of suspected skin & soft tissue infection    Drug Allergies:   Review of patient's allergies indicates:   Allergen Reactions    Codeine Nausea Only       Actual Body Weight:   96.4 kg    Renal Function:   Estimated Creatinine Clearance: 115 mL/min (based on SCr of 0.6 mg/dL).,     Dialysis Method (if applicable):  N/A    CBC (last 72 hours):  Recent Labs   Lab Result Units 11/03/24  1004   WBC K/uL 3.95   Hemoglobin g/dL 11.5*   Hematocrit % 36.2*   Platelets K/uL 169   Gran % % 58.1   Lymph % % 26.1   Mono % % 11.9   Eosinophil % % 3.3   Basophil % % 0.3   Differential Method  Automated       Metabolic Panel (last 72 hours):  Recent Labs   Lab Result Units 11/03/24  1004 11/03/24  1044   Sodium mmol/L 140  --    Potassium mmol/L 4.1  --    Chloride mmol/L 107  --    CO2 mmol/L 31*  --    Glucose mg/dL 76  --    Glucose, UA   --  Negative   BUN mg/dL 14  --    Creatinine mg/dL 0.6  --    Albumin g/dL 3.8  --    Total Bilirubin mg/dL 0.5  --    Alkaline Phosphatase U/L 90  --    AST U/L 21  --    ALT U/L 13  --        Drug levels (last 3 results):  No results for input(s): "VANCOMYCINRA", "VANCORANDOM", "VANCOMYCINPE", "VANCOPEAK", "VANCOMYCINTR", "VANCOTROUGH" in the last 72 hours.    Microbiologic Results:  Microbiology Results " (last 7 days)       Procedure Component Value Units Date/Time    Aerobic culture [[id number]] Collected: 11/03/24 1611    Order Status: Sent Specimen: Wound from Breast, Left Updated: 11/03/24 1622    Gram stain [[id number]] Collected: 11/03/24 1611    Order Status: Sent Specimen: Wound from Breast, Left Updated: 11/03/24 1621    Blood culture x two cultures. Draw prior to antibiotics. [[id number]] Collected: 11/03/24 1006    Order Status: Sent Specimen: Blood from Peripheral, Hand, Left Updated: 11/03/24 1012    Blood culture x two cultures. Draw prior to antibiotics. [[id number]] Collected: 11/03/24 1006    Order Status: Sent Specimen: Blood from Peripheral, Hand, Right Updated: 11/03/24 1012

## 2024-11-03 NOTE — ED PROVIDER NOTES
Encounter Date: 11/3/2024       History     Chief Complaint   Patient presents with    Wound Check     Patient had lumpectomy 10/2022 then multiple surgeries this year - was changing the dressing yesterday and found that wound is now red and swollen and warm to touch, denies drainage - patient is not on ABX but would like the wound checked.      64-year-old female presents emergency department with left breast wound.  Patient has a history of lumpectomy status post multiple plastic surgeries by Dr. Cadena, 2 skin flaps.  Patient has broken times but this plastic surgeon and is now following with Dr. Gray from wound care.  Patient has a hole in the lateral aspect of the left breast which is trying to be close with packing by wound care.  Patient's  says that occasionally staples will extrude/come out through this hole.  Patient says yesterday woke up and noticed that the left breast started to get red slightly warm and there was some discoloration.  She denies having any fever or any chills.  She denies having any drainage from the whole to the left lateral aspect of the breast.  No purulent drainage.  Wound does not look good and was advised to come to the ER to get checked.      Review of patient's allergies indicates:   Allergen Reactions    Codeine Nausea Only     Past Medical History:   Diagnosis Date    Arthritis     Breast cancer     Chronic constipation     Chronic insomnia     Floaters in visual field     GERD (gastroesophageal reflux disease)     Seasonal allergies     Seborrheic keratoses     Thumb pain 2018     Past Surgical History:   Procedure Laterality Date    ABDOMINOPLASTY      BREAST BIOPSY      BREAST MASS EXCISION      COSMETIC SURGERY      tummy tuck    CYST REMOVAL  05/27/2021    chest    INTERPOSITION ARTHROPLASTY OF CARPOMETACARPAL JOINTS Left 06/09/2021    Procedure: INTERPOSITION ARTHROPLASTY, CMC JOINT;  Surgeon: Giovani Molina MD;  Location: [facility];  Service: Orthopedics;   Laterality: Left;    LASIK Bilateral     LIPOSUCTION W/ FAT INJECTION Bilateral 2024    Procedure: LIPOSUCTION, WITH FAT TRANSFER;  Surgeon: Yuniel Cadena MD;  Location: [facility];  Service: Plastics;  Laterality: Bilateral;    MASTECTOMY, PARTIAL Left 10/19/2021    Procedure: MASTECTOMY, PARTIAL;  Surgeon: Lila Perales MD;  Location: [facility];  Service: General;  Laterality: Left;    MASTOPEXY Bilateral 2024    Procedure: MASTOPEXY;  Surgeon: Yuniel Cadena MD;  Location: [facility] OR;  Service: Plastics;  Laterality: Bilateral;    NEEDLE LOCALIZATION Left 10/19/2021    Procedure: NEEDLE LOCALIZATION;  Surgeon: Lila Perales MD;  Location: [facility];  Service: General;  Laterality: Left;  LOCALIZER DONE 10/15    SHOULDER SURGERY Left     bone spur    SKIN FULL THICKNESS GRAFT Bilateral 2024    Procedure: APPLICATION, GRAFT, SKIN, FULL-THICKNESS;  Surgeon: Yuniel Cadena MD;  Location: [facility];  Service: Plastics;  Laterality: Bilateral;    THUMB ARTHROSCOPY Left     TONSILLECTOMY       Family History   Problem Relation Name Age of Onset    Stroke Mother      Prostate cancer Father      Melanoma Neg Hx      Psoriasis Neg Hx      Lupus Neg Hx      Eczema Neg Hx       Social History     Tobacco Use    Smoking status: Former     Current packs/day: 0.00     Average packs/day: 0.3 packs/day for 27.8 years (6.9 ttl pk-yrs)     Types: Cigarettes     Start date:      Quit date: 10/6/2002     Years since quittin.0    Smokeless tobacco: Never   Substance Use Topics    Alcohol use: Yes     Alcohol/week: 4.0 standard drinks of alcohol     Types: 4 Standard drinks or equivalent per week     Comment: daiquiris, weekly    Drug use: Yes     Frequency: 2.0 times per week     Types: Marijuana     Comment: thc gummies     Review of Systems   Constitutional:  Negative for chills and fever.   HENT:  Negative for sore throat.    Respiratory:  Negative for shortness of breath.    Cardiovascular:  Negative for  chest pain.   Gastrointestinal:  Negative for nausea and vomiting.   Genitourinary:  Negative for dysuria.   Musculoskeletal:  Negative for back pain.   Skin:  Positive for wound. Negative for rash.   Neurological:  Negative for weakness.   Hematological:  Does not bruise/bleed easily.   All other systems reviewed and are negative.      Physical Exam     Initial Vitals [11/03/24 0842]   BP Pulse Resp Temp SpO2   (!) 120/58 74 18 97.7 °F (36.5 °C) 98 %      MAP       --         Physical Exam    Nursing note and vitals reviewed.  Constitutional: She appears well-developed and well-nourished. No distress.   HENT:   Head: Normocephalic and atraumatic. Mouth/Throat: No oropharyngeal exudate.   Eyes: Conjunctivae and EOM are normal. Pupils are equal, round, and reactive to light.   Neck: Neck supple. No tracheal deviation present.   Cardiovascular:  Normal rate, regular rhythm, normal heart sounds and intact distal pulses.           No murmur heard.  Pulmonary/Chest: Breath sounds normal. No stridor. No respiratory distress. She has no wheezes. She has no rhonchi. She has no rales.   Left breast: There is erythema, some induration to the medial aspect of the left breast, there is chronic appearing wound with a hole in the lateral aspect of the inferior left breast.  No purulent drainage able to be expressed.  There is no obvious fluid collection, pointing, fluctuance or any drainage.  No crepitance to palpation.  There is no tenderness to palpation.  No pain out of proportion.   Abdominal: Abdomen is soft. She exhibits no distension. There is no abdominal tenderness. There is no rebound and no guarding.   Musculoskeletal:         General: No tenderness or edema. Normal range of motion.      Cervical back: Neck supple.     Lymphadenopathy:     She has no cervical adenopathy.   Neurological: She is alert and oriented to person, place, and time. She has normal strength. No cranial nerve deficit or sensory deficit.   Skin:  Skin is warm and dry. Capillary refill takes less than 2 seconds. No rash noted. No erythema. No pallor.   Psychiatric: She has a normal mood and affect. Her behavior is normal. Judgment and thought content normal.               ED Course   Procedures  Labs Reviewed   CBC W/ AUTO DIFFERENTIAL - Abnormal       Result Value    WBC 3.95      RBC 4.12      Hemoglobin 11.5 (*)     Hematocrit 36.2 (*)     MCV 88      MCH 27.9      MCHC 31.8 (*)     RDW 15.6 (*)     Platelets 169      MPV 9.0 (*)     Immature Granulocytes 0.3      Gran # (ANC) 2.3      Immature Grans (Abs) 0.01      Lymph # 1.0      Mono # 0.5      Eos # 0.1      Baso # 0.01      nRBC 0      Gran % 58.1      Lymph % 26.1      Mono % 11.9      Eosinophil % 3.3      Basophil % 0.3      Differential Method Automated     COMPREHENSIVE METABOLIC PANEL - Abnormal    Sodium 140      Potassium 4.1      Chloride 107      CO2 31 (*)     Glucose 76      BUN 14      Creatinine 0.6      Calcium 8.7      Total Protein 6.5      Albumin 3.8      Total Bilirubin 0.5      Alkaline Phosphatase 90      AST 21      ALT 13      eGFR >60.0      Anion Gap 2 (*)    CULTURE, BLOOD   CULTURE, BLOOD   URINALYSIS, REFLEX TO URINE CULTURE    Specimen UA Urine, Clean Catch      Color, UA Yellow      Appearance, UA Clear      pH, UA 7.0      Specific Gravity, UA 1.015      Protein, UA Negative      Glucose, UA Negative      Ketones, UA Negative      Bilirubin (UA) Negative      Occult Blood UA Negative      Nitrite, UA Negative      Urobilinogen, UA Negative      Leukocytes, UA Negative      Narrative:     Specimen Source->Urine   PROCALCITONIN    Procalcitonin 0.069     ISTAT LACTATE    POC Lactate 0.62      Sample VENOUS     POCT LACTATE          Imaging Results              CT Chest With Contrast (Final result)  Result time 11/03/24 11:13:32      Final result by Mark Callejas MD (11/03/24 11:13:32)                   Impression:      1. Left breast soft tissue gas and edema, with no  findings of soft tissue abscess.  2. Additional observations as described.      Electronically signed by: Mark Callejas  Date:    11/03/2024  Time:    11:13               Narrative:    EXAMINATION:  CT CHEST WITH CONTRAST    CLINICAL HISTORY:  Soft tissue mass, chest, US/xray nondiagnostic;left breast cellulitis , r/o abscess;    TECHNIQUE:  Axial imaging through the chest was performed with 100 mL Omnipaque 350 IV contrast.    FINDINGS:  Comparison to prior exams including 02/14/2024.  The heart and great vessels enhance normally, with minimal calcified plaque of the aorta.  There is no pericardial effusion, with no enlarged mediastinal or hilar lymph nodes.    Mild dependent reticular and ground-glass densities in both lungs reflect subsegmental atelectasis.  There is no consolidation, pleural effusion or evidence of pulmonary edema.  No suspicious pulmonary nodules or masses.  The central airways are patent.    There are postoperative changes of bilateral breast reconstruction, with localized fat stranding and soft tissue gas in the left breast laterally, contiguous with overlying left breast skin defect.  There is no left breast fluid collection to suggest abscess.  No enlarged axillary lymph nodes.    Images of the upper abdomen are unremarkable.  There are no acute fractures or destructive osseous lesions.                                       Medications   vancomycin - pharmacy to dose (has no administration in time range)   vancomycin (VANCOCIN) 1,750 mg in D5W 500 mL IVPB (1,750 mg Intravenous New Bag 11/3/24 1102)   sodium chloride 0.9% flush 2 mL (has no administration in time range)   melatonin tablet 6 mg (has no administration in time range)   ondansetron injection 4 mg (has no administration in time range)   senna-docusate 8.6-50 mg per tablet 1 tablet (has no administration in time range)   acetaminophen tablet 650 mg (has no administration in time range)   aluminum-magnesium hydroxide-simethicone  200-200-20 mg/5 mL suspension 30 mL (has no administration in time range)   acetaminophen tablet 650 mg (has no administration in time range)   HYDROcodone-acetaminophen 5-325 mg per tablet 1 tablet (has no administration in time range)   naloxone 0.4 mg/mL injection 0.02 mg (has no administration in time range)   potassium bicarbonate disintegrating tablet 50 mEq (has no administration in time range)   potassium bicarbonate disintegrating tablet 35 mEq (has no administration in time range)   potassium bicarbonate disintegrating tablet 60 mEq (has no administration in time range)   magnesium oxide tablet 800 mg (has no administration in time range)   magnesium oxide tablet 800 mg (has no administration in time range)   potassium, sodium phosphates 280-160-250 mg packet 2 packet (has no administration in time range)   potassium, sodium phosphates 280-160-250 mg packet 2 packet (has no administration in time range)   potassium, sodium phosphates 280-160-250 mg packet 2 packet (has no administration in time range)   glucose chewable tablet 16 g (has no administration in time range)   glucose chewable tablet 24 g (has no administration in time range)   dextrose 50% injection 12.5 g (has no administration in time range)   dextrose 50% injection 25 g (has no administration in time range)   glucagon (human recombinant) injection 1 mg (has no administration in time range)   enoxaparin injection 40 mg (has no administration in time range)   cefTRIAXone injection 2 g (2 g Intravenous Given 11/3/24 1056)   iohexoL (OMNIPAQUE 350) injection 100 mL (100 mLs Intravenous Given 11/3/24 1048)     Medical Decision Making  Differential includes not limited to cellulitis, breast abscess, foreign body,    Emergent evaluation of a 64-year-old female presents emergency department with left breast redness.  She has a wound to her left breast which is not healing.  Patient yesterday started with redness.  Patient has significant cellulitis  to the left breast.  However she does not have any pain.  She has no crepitance to palpation.  She has a normal white count.  She has no fever.  Procalcitonin and lactic acid are all normal.  This all goes against any kind of necrotizing type infection.  She does have some soft tissue gas on CT scan but I believe that this is more than likely from the hole in the lateral aspect of the left breast region.  CT scan does not show any abscess.  I do not feel that patient needs any acute surgical intervention at this point likely needs IV antibiotics.  Patient has been given IV ceftriaxone and vancomycin in the emergency department.  Patient is not a diabetic.  I discussed the case with the hospitalist who will admit the patient to the hospital.    Amount and/or Complexity of Data Reviewed  External Data Reviewed: labs and notes.  Labs: ordered. Decision-making details documented in ED Course.  Radiology: ordered and independent interpretation performed. Decision-making details documented in ED Course.    Risk  OTC drugs.  Prescription drug management.  Decision regarding hospitalization.               ED Course as of 11/03/24 1244   Sun Nov 03, 2024   1230 I Discussed the case with Dr. Kincaid for admission who will admit the patient to the hospital. [JR]      ED Course User Index  [JR] Lorenzo Tomlinson DO                           Clinical Impression:  Final diagnoses:  [N61.0] Cellulitis of left breast (Primary)          ED Disposition Condition    Admit Stable                Lorenzo Tomlinson DO  11/03/24 1245

## 2024-11-04 LAB
ALBUMIN SERPL BCP-MCNC: 3.7 G/DL (ref 3.5–5.2)
ALP SERPL-CCNC: 97 U/L (ref 55–135)
ALT SERPL W/O P-5'-P-CCNC: 12 U/L (ref 10–44)
ANION GAP SERPL CALC-SCNC: 5 MMOL/L (ref 8–16)
AST SERPL-CCNC: 21 U/L (ref 10–40)
BASOPHILS # BLD AUTO: 0.02 K/UL (ref 0–0.2)
BASOPHILS NFR BLD: 0.5 % (ref 0–1.9)
BILIRUB SERPL-MCNC: 0.4 MG/DL (ref 0.1–1)
BUN SERPL-MCNC: 19 MG/DL (ref 8–23)
CALCIUM SERPL-MCNC: 9.2 MG/DL (ref 8.7–10.5)
CHLORIDE SERPL-SCNC: 105 MMOL/L (ref 95–110)
CO2 SERPL-SCNC: 29 MMOL/L (ref 23–29)
CREAT SERPL-MCNC: 0.6 MG/DL (ref 0.5–1.4)
DIFFERENTIAL METHOD BLD: ABNORMAL
EOSINOPHIL # BLD AUTO: 0.2 K/UL (ref 0–0.5)
EOSINOPHIL NFR BLD: 5.7 % (ref 0–8)
ERYTHROCYTE [DISTWIDTH] IN BLOOD BY AUTOMATED COUNT: 15.7 % (ref 11.5–14.5)
EST. GFR  (NO RACE VARIABLE): >60 ML/MIN/1.73 M^2
GLUCOSE SERPL-MCNC: 93 MG/DL (ref 70–110)
GRAM STN SPEC: NORMAL
GRAM STN SPEC: NORMAL
HCT VFR BLD AUTO: 38 % (ref 37–48.5)
HGB BLD-MCNC: 12 G/DL (ref 12–16)
IMM GRANULOCYTES # BLD AUTO: 0.01 K/UL (ref 0–0.04)
IMM GRANULOCYTES NFR BLD AUTO: 0.3 % (ref 0–0.5)
LYMPHOCYTES # BLD AUTO: 1.2 K/UL (ref 1–4.8)
LYMPHOCYTES NFR BLD: 29.7 % (ref 18–48)
MAGNESIUM SERPL-MCNC: 2 MG/DL (ref 1.6–2.6)
MCH RBC QN AUTO: 28 PG (ref 27–31)
MCHC RBC AUTO-ENTMCNC: 31.6 G/DL (ref 32–36)
MCV RBC AUTO: 89 FL (ref 82–98)
MONOCYTES # BLD AUTO: 0.4 K/UL (ref 0.3–1)
MONOCYTES NFR BLD: 10.9 % (ref 4–15)
NEUTROPHILS # BLD AUTO: 2.1 K/UL (ref 1.8–7.7)
NEUTROPHILS NFR BLD: 52.9 % (ref 38–73)
NRBC BLD-RTO: 0 /100 WBC
PLATELET # BLD AUTO: 182 K/UL (ref 150–450)
PMV BLD AUTO: 9.3 FL (ref 9.2–12.9)
POTASSIUM SERPL-SCNC: 4.2 MMOL/L (ref 3.5–5.1)
PROT SERPL-MCNC: 6.3 G/DL (ref 6–8.4)
RBC # BLD AUTO: 4.28 M/UL (ref 4–5.4)
SODIUM SERPL-SCNC: 139 MMOL/L (ref 136–145)
VANCOMYCIN TROUGH SERPL-MCNC: 11.7 UG/ML
WBC # BLD AUTO: 3.87 K/UL (ref 3.9–12.7)

## 2024-11-04 PROCEDURE — 80053 COMPREHEN METABOLIC PANEL: CPT | Performed by: HOSPITALIST

## 2024-11-04 PROCEDURE — 36415 COLL VENOUS BLD VENIPUNCTURE: CPT | Performed by: HOSPITALIST

## 2024-11-04 PROCEDURE — 99221 1ST HOSP IP/OBS SF/LOW 40: CPT | Mod: ,,, | Performed by: FAMILY MEDICINE

## 2024-11-04 PROCEDURE — 63600175 PHARM REV CODE 636 W HCPCS: Performed by: HOSPITALIST

## 2024-11-04 PROCEDURE — 12000002 HC ACUTE/MED SURGE SEMI-PRIVATE ROOM

## 2024-11-04 PROCEDURE — 25000003 PHARM REV CODE 250: Performed by: HOSPITALIST

## 2024-11-04 PROCEDURE — 99233 SBSQ HOSP IP/OBS HIGH 50: CPT | Mod: ,,, | Performed by: INTERNAL MEDICINE

## 2024-11-04 PROCEDURE — 85025 COMPLETE CBC W/AUTO DIFF WBC: CPT | Performed by: HOSPITALIST

## 2024-11-04 PROCEDURE — 83735 ASSAY OF MAGNESIUM: CPT | Performed by: HOSPITALIST

## 2024-11-04 PROCEDURE — 80202 ASSAY OF VANCOMYCIN: CPT | Performed by: HOSPITALIST

## 2024-11-04 RX ADMIN — VENLAFAXINE HYDROCHLORIDE 37.5 MG: 37.5 CAPSULE, EXTENDED RELEASE ORAL at 10:11

## 2024-11-04 RX ADMIN — ENOXAPARIN SODIUM 40 MG: 40 INJECTION SUBCUTANEOUS at 04:11

## 2024-11-04 RX ADMIN — SENNOSIDES AND DOCUSATE SODIUM 1 TABLET: 8.6; 5 TABLET ORAL at 04:11

## 2024-11-04 RX ADMIN — ACETAMINOPHEN 650 MG: 325 TABLET ORAL at 11:11

## 2024-11-04 RX ADMIN — Medication 6 MG: at 09:11

## 2024-11-04 RX ADMIN — VANCOMYCIN HYDROCHLORIDE 1750 MG: 5 INJECTION, POWDER, LYOPHILIZED, FOR SOLUTION INTRAVENOUS at 11:11

## 2024-11-04 RX ADMIN — CEFTRIAXONE SODIUM 2 G: 2 INJECTION, POWDER, FOR SOLUTION INTRAMUSCULAR; INTRAVENOUS at 10:11

## 2024-11-04 RX ADMIN — PANTOPRAZOLE SODIUM 40 MG: 40 TABLET, DELAYED RELEASE ORAL at 05:11

## 2024-11-04 NOTE — HOSPITAL COURSE
Patient admitted and placed on IV abx. Wound care and ID consulted. ID made recommendations for iv abx, and planned to make recommendations for oral abx at discharge. Recommended oral cipro and zyvox at d/c, and patient was discharged home in stable condition. Will f/u outpatient with ID and wound care.

## 2024-11-04 NOTE — PROGRESS NOTES
[facility]   Department of Infectious Disease  Progress Note        PATIENT NAME: Paula Hernadez  MRN: [id number]  TODAY'S DATE: 11/04/2024  ADMIT DATE: 11/3/2024  LOS: 1 days    CHIEF COMPLAINT: Wound Check (Patient had lumpectomy 10/2022 then multiple surgeries this year - was changing the dressing yesterday and found that wound is now red and swollen and warm to touch, denies drainage - patient is not on ABX but would like the wound checked. )      PRINCIPLE PROBLEM: Cellulitis of left breast    INTERVAL HISTORY      11/04/2024: No acute issues overnight.  Tolerating antibiotics okay.  Redness improving.  Blood and wound culture so far negative.      Antibiotics (From admission, onward)      Start     Stop Route Frequency Ordered    11/04/24 1100  cefTRIAXone injection 2 g         -- IV Every 24 hours (non-standard times) 11/03/24 1458    11/03/24 2300  vancomycin (VANCOCIN) 1,750 mg in D5W 500 mL IVPB         -- IV Every 12 hours (non-standard times) 11/03/24 1648    11/03/24 1724  vancomycin - pharmacy to dose         -- IV pharmacy to manage frequency 11/03/24 1625          Antifungals (From admission, onward)      Start     Stop Route Frequency Ordered    11/06/24 0900  fluconazole tablet 200 mg         -- Oral Weekly 11/03/24 1410           Antivirals (From admission, onward)      None            ASSESSMENT and PLAN      1. Left breast cellulitis in a patient with chronic lower breast wound.  Continue vancomycin and ceftriaxone for now.  Follow up blood cultures.  Open wound swab was also sent for Gram stain and culture.  Reassess with results.       2. Right breast and anterior chest wall cellulitis.  Antibiotics as above.      RECOMMENDATIONS:    Continue current antibiotics   Anticipate switch to oral antibiotics complete therapy   Maybe able to discharge in the next 1-2 days    Please send Epic secure chat with any questions.      SUBJECTIVE    Paula Hernadez is a 64 y.o. female with  history of left partial mastectomy for management of breast cancer followed by radiation therapy.  She then had breast reconstruction in April 20, 2024.  The wound did not heal well due to poor tissue from previous radiation.  She had STSG in [place] bed 20, 2024 to cover the wound but it use the crossmatch was failed.  Then had a 2nd STSG in July 2024 with 50% take.  Has since [facility] with Dr. Mckeon.     Noted redness of the breast yesterday which got worse.  Presented to the emergency room today and was admitted.  Vitals in the ED unremarkable.  She was admitted and placed on vancomycin and ceftriaxone.  ID asked to assist with her care.     Antibiotic history:    Vancomycin: 11/03/2024-  Ceftriaxone:  11/03/2024-     Microbiology:    Blood culture 11/03/2024: NGTD    Review of Systems  Negative except as stated above in Interval History     OBJECTIVE   Temp:  [97.6 °F (36.4 °C)-98.2 °F (36.8 °C)] 98.2 °F (36.8 °C)  Pulse:  [62-72] 70  Resp:  [17-18] 18  SpO2:  [98 %-100 %] 99 %  BP: ()/(51-79) 139/71  Temp:  [97.6 °F (36.4 °C)-98.2 °F (36.8 °C)]   Temp: 98.2 °F (36.8 °C) (11/04/24 0822)  Pulse: 70 (11/04/24 0822)  Resp: 18 (11/04/24 0822)  BP: 139/71 (11/04/24 0822)  SpO2: 99 % (11/04/24 0822)    Intake/Output Summary (Last 24 hours) at 11/4/2024 0924  Last data filed at 11/4/2024 0908  Gross per 24 hour   Intake 1800 ml   Output 1 ml   Net 1799 ml       Physical Exam  General:  Middle-aged man lying quietly in bed in no acute distress   Chest:  Fading erythema bilateral breasts.  Dressing inferior left breast.  Not taken down  CVS: S1 and 2 heard, no murmurs appreciated   Respiratory: Clear to auscultation   Abdomen: Full, soft, nontender, no palpable organomegaly   Skin: No rash appreciated   CNS: No focal deficits  Musculoskeletal: No joint or bony abnormalities appreciated  Psych: Good mood, normal affect.      VAD:  PIV  ISOLATION:  None     Wounds:  Left breast wound    Significant  "Labs: All pertinent labs within the past 24 hours have been reviewed.    CBC LAST 7 DAYS  Recent Labs   Lab 11/03/24  1004 11/04/24  0507   WBC 3.95 3.87*   RBC 4.12 4.28   HGB 11.5* 12.0   HCT 36.2* 38.0   MCV 88 89   MCH 27.9 28.0   MCHC 31.8* 31.6*   RDW 15.6* 15.7*    182   MPV 9.0* 9.3   GRAN 58.1  2.3 52.9  2.1   LYMPH 26.1  1.0 29.7  1.2   MONO 11.9  0.5 10.9  0.4   BASO 0.01 0.02   NRBC 0 0       CHEMISTRY LAST 7 DAYS  Recent Labs   Lab 11/03/24  1004 11/04/24  0507    139   K 4.1 4.2    105   CO2 31* 29   ANIONGAP 2* 5*   BUN 14 19   CREATININE 0.6 0.6   GLU 76 93   CALCIUM 8.7 9.2   MG  --  2.0   ALBUMIN 3.8 3.7   PROT 6.5 6.3   ALKPHOS 90 97   ALT 13 12   AST 21 21   BILITOT 0.5 0.4       Estimated Creatinine Clearance: 115 mL/min (based on SCr of 0.6 mg/dL).    INFLAMMATORY/PROCAL  LAST 7 DAYS  Recent Labs   Lab 11/03/24  1004   PROCAL 0.069     No results found for: "ESR"  No results found for: "CRP"    PRIOR  MICROBIOLOGY:    No results found for the last 90 days.      LAST 7 DAYS MICROBIOLOGY   Microbiology Results (last 7 days)       Procedure Component Value Units Date/Time    Gram stain [[id number]] Collected: 11/03/24 1611    Order Status: Completed Specimen: Wound from Breast, Left Updated: 11/04/24 0912     Gram Stain Result Few WBC's      No organisms seen    Narrative:      Breast, Left    Aerobic culture [[id number]] Collected: 11/03/24 1611    Order Status: Completed Specimen: Wound from Breast, Left Updated: 11/04/24 0818     Aerobic Bacterial Culture No growth    Narrative:      Breast, Left    Blood culture x two cultures. Draw prior to antibiotics. [[id number]] Collected: 11/03/24 1006    Order Status: Completed Specimen: Blood from Peripheral, Hand, Right Updated: 11/03/24 1717     Blood Culture, Routine No Growth to date    Narrative:      Aerobic and anaerobic    Blood culture x two cultures. Draw prior to antibiotics. [[id number]] Collected: 11/03/24 1006    " Order Status: Completed Specimen: Blood from Peripheral, Hand, Left Updated: 11/03/24 1717     Blood Culture, Routine No Growth to date    Narrative:      Aerobic and anaerobic          CURRENT/PREVIOUS VISIT EKG  Results for orders placed or performed in visit on 02/14/24   IN OFFICE EKG 12-LEAD (to [place])    Collection Time: 02/14/24 10:54 AM   Result Value Ref Range    QRS Duration 86 ms    [facility] QTC Calculation 439 ms    Narrative    Test Reason : R06.09,R07.89,R00.2,    Vent. Rate : 077 BPM     Atrial Rate : 077 BPM     P-R Int : 158 ms          QRS Dur : 086 ms      QT Int : 388 ms       P-R-T Axes : 071 022 071 degrees     QTc Int : 439 ms    Normal sinus rhythm  Normal ECG  When compared with ECG of 27-MAY-2021 13:24,  No significant change was found  Confirmed by Alan Nicholas MD ([id number]) on 2/14/2024 12:47:28 PM    Referred By: Harpreet Gonsalez           Confirmed By:Alan Nicholas MD       Significant Imaging: I have reviewed all relevant and available imaging results/findings within the past 24 hours.    I spent a total of 50 minutes on the day of the visit.This includes face to face time and non-face to face time preparing to see the patient (eg, review of tests), obtaining and/or reviewing separately obtained history, documenting clinical information in the electronic or other health record, independently interpreting results and communicating results to the patient/family/caregiver, or care coordinator.    Destin Álvarez MD  Date of Service: 11/04/2024      This note was created using [employer] voice recognition software that occasionally misinterpreted phrases or words.

## 2024-11-04 NOTE — CONSULTS
Left posterior back and axilla slightly pink demarcated            Left breast redness healing dry firm wound  demarcated, cleaned and dried and applied moistened hydrofera blue and border foam.     Patient well known to Dr. Gray. Skin assessment complete

## 2024-11-04 NOTE — CONSULTS
[facility]   Department of Infectious Disease  Consult Note        PATIENT NAME: Paula Hernadez  MRN: [id number]  TODAY'S DATE: 11/03/2024  ADMIT DATE: 11/3/2024  LOS: 0 days    CHIEF COMPLAINT: Wound Check (Patient had lumpectomy 10/2022 then multiple surgeries this year - was changing the dressing yesterday and found that wound is now red and swollen and warm to touch, denies drainage - patient is not on ABX but would like the wound checked. )      PRINCIPLE PROBLEM: Cellulitis of left breast    REASON FOR CONSULT:     ASSESSMENT and PLAN      1. Left breast cellulitis in a patient with chronic lower breast wound.  Continue vancomycin and ceftriaxone for now.  Follow up blood cultures.  Open wound swab was also sent for Gram stain and culture.  Reassess with results.      2. Right breast and anterior chest wall cellulitis.  Antibiotics as above.      RECOMMENDATIONS:   1. Left breast wound for Gram stain and culture.    2. Continue vancomycin and ceftriaxone for now and reassess with culture results.    Thank you for this consult. Please send [employer] secure chat with any questions.    Antibiotics (From admission, onward)      Start     Stop Route Frequency Ordered    11/04/24 1100  cefTRIAXone injection 2 g         -- IV Every 24 hours (non-standard times) 11/03/24 1458    11/03/24 2300  vancomycin (VANCOCIN) 1,750 mg in D5W 500 mL IVPB         -- IV Every 12 hours (non-standard times) 11/03/24 1648    11/03/24 1724  vancomycin - pharmacy to dose         -- IV pharmacy to manage frequency 11/03/24 1625          Antifungals (From admission, onward)      Start     Stop Route Frequency Ordered    11/06/24 0900  fluconazole tablet 200 mg         -- Oral Weekly 11/03/24 1410           Antivirals (From admission, onward)      None            HPI      Paula Hernadez is a 64 y.o. female with history of left partial mastectomy for management of breast cancer followed by radiation therapy.  She then had breast  reconstruction in April 20, 2024.  The wound did not heal well due to poor tissue from previous radiation.  She had STSG in [place] bed 20, 2024 to cover the wound but it use the crossmatch was failed.  Then had a 2nd STSG in July 2024 with 50% take.  Has since followed up [facility] with Dr. Mckeon.    Noted redness of the breast yesterday which got worse.  Presented to the emergency room today and was admitted.  Vitals in the ED unremarkable.  She was admitted and placed on vancomycin and ceftriaxone.  ID asked to assist with her care.    Antibiotic history:    Vancomycin: 11/03/2024-  Ceftriaxone:  11/03/2024-    Microbiology:    Blood culture 11/03/2024: NGTD    Social History  Marital Status:   Alcohol History:  reports current alcohol use of about 4.0 standard drinks of alcohol per week.  Tobacco History:  reports that she quit smoking about 22 years ago. Her smoking use included cigarettes. She started smoking about 49 years ago. She has a 6.9 pack-year smoking history. She has never used smokeless tobacco.  Drug History:  reports current drug use. Frequency: 2.00 times per week. Drug: Marijuana.      Review of patient's allergies indicates:   Allergen Reactions    Codeine Nausea Only     Past Medical History:   Diagnosis Date    Arthritis     Breast cancer     Chronic constipation     Chronic insomnia     Floaters in visual field     GERD (gastroesophageal reflux disease)     Seasonal allergies     Seborrheic keratoses     Thumb pain 2018     Past Surgical History:   Procedure Laterality Date    ABDOMINOPLASTY      BREAST BIOPSY      BREAST MASS EXCISION      COSMETIC SURGERY      tummy tuck    CYST REMOVAL  05/27/2021    chest    INTERPOSITION ARTHROPLASTY OF CARPOMETACARPAL JOINTS Left 06/09/2021    Procedure: INTERPOSITION ARTHROPLASTY, [facility] JOINT;  Surgeon: Giovani Molina MD;  Location: [facility];  Service: Orthopedics;  Laterality: Left;    LASIK Bilateral     LIPOSUCTION W/ FAT INJECTION Bilateral  04/09/2024    Procedure: LIPOSUCTION, WITH FAT TRANSFER;  Surgeon: Yuniel Cadena MD;  Location: [facility] OR;  Service: Plastics;  Laterality: Bilateral;    MASTECTOMY, PARTIAL Left 10/19/2021    Procedure: MASTECTOMY, PARTIAL;  Surgeon: Lila Perales MD;  Location: [facility] OR;  Service: General;  Laterality: Left;    MASTOPEXY Bilateral 04/09/2024    Procedure: MASTOPEXY;  Surgeon: Yuniel Cadena MD;  Location: [facility] OR;  Service: Plastics;  Laterality: Bilateral;    NEEDLE LOCALIZATION Left 10/19/2021    Procedure: NEEDLE LOCALIZATION;  Surgeon: Lila Perales MD;  Location: [facility] OR;  Service: General;  Laterality: Left;  LOCALIZER DONE 10/15    SHOULDER SURGERY Left     bone spur    SKIN FULL THICKNESS GRAFT Bilateral 6/18/2024    Procedure: APPLICATION, GRAFT, SKIN, FULL-THICKNESS;  Surgeon: Yuniel Cadena MD;  Location: [facility] OR;  Service: Plastics;  Laterality: Bilateral;    THUMB ARTHROSCOPY Left     TONSILLECTOMY       Family History   Problem Relation Name Age of Onset    Stroke Mother      Prostate cancer Father      Melanoma Neg Hx      Psoriasis Neg Hx      Lupus Neg Hx      Eczema Neg Hx         SUBJECTIVE     Review of systems: 10 system review unremarkable.  As in HPI     OBJECTIVE   Temp:  [97.7 °F (36.5 °C)-98.1 °F (36.7 °C)] 98 °F (36.7 °C)  Pulse:  [63-74] 63  Resp:  [17-18] 17  SpO2:  [98 %-100 %] 98 %  BP: ()/(51-73) 115/73  Temp:  [97.7 °F (36.5 °C)-98.1 °F (36.7 °C)]   Temp: 98 °F (36.7 °C) (11/03/24 1904)  Pulse: 63 (11/03/24 1904)  Resp: 17 (11/03/24 1904)  BP: 115/73 (11/03/24 1904)  SpO2: 98 % (11/03/24 1904)    Intake/Output Summary (Last 24 hours) at 11/3/2024 2244  Last data filed at 11/3/2024 1920  Gross per 24 hour   Intake 740 ml   Output 1 ml   Net 739 ml       Physical Exam  General:  Middle-aged man lying quietly in bed in no acute distress   Chest:  Diffuse erythema of left breast.  Healing wound inferior breast with an open area measuring approximately 1.5 x 0.2 x 2.5 cm  "deep with no drainage.  New faint erythema lower right breast.  Satellite erythema epigastric area  CVS: S1 and 2 heard, no murmurs appreciated   Respiratory: Clear to auscultation   Abdomen: Full, soft, nontender, no palpable organomegaly   Skin: No rash appreciated   CNS: No focal deficits  Musculoskeletal: No joint or bony abnormalities appreciated  Psych: Good mood, normal affect.     VAD:  PIV  ISOLATION:  None    Wounds:  Left breast wound    Significant Labs: All pertinent labs within the past 24 hours have been reviewed.    CBC LAST 7  Recent Labs   Lab 11/03/24  1004   WBC 3.95   RBC 4.12   HGB 11.5*   HCT 36.2*   MCV 88   MCH 27.9   MCHC 31.8*   RDW 15.6*      MPV 9.0*   GRAN 58.1  2.3   LYMPH 26.1  1.0   MONO 11.9  0.5   BASO 0.01   NRBC 0       CHEMISTRY LAST 7  Recent Labs   Lab 11/03/24  1004      K 4.1      CO2 31*   ANIONGAP 2*   BUN 14   CREATININE 0.6   GLU 76   CALCIUM 8.7   ALBUMIN 3.8   PROT 6.5   ALKPHOS 90   ALT 13   AST 21   BILITOT 0.5       Estimated Creatinine Clearance: 115 mL/min (based on SCr of 0.6 mg/dL).    INFLAMMATORY/PROCAL  LAST 7  Recent Labs   Lab 11/03/24  1004   PROCAL 0.069     No results found for: "ESR"  No results found for: "CRP"    PRIOR  MICROBIOLOGY:  Reviewed    No results found for the last 90 days.    LAST 7 DAYS MICROBIOLOGY   Microbiology Results (last 7 days)       Procedure Component Value Units Date/Time    Blood culture x two cultures. Draw prior to antibiotics. [[id number]] Collected: 11/03/24 1006    Order Status: Completed Specimen: Blood from Peripheral, Hand, Right Updated: 11/03/24 1717     Blood Culture, Routine No Growth to date    Narrative:      Aerobic and anaerobic    Blood culture x two cultures. Draw prior to antibiotics. [[id number]] Collected: 11/03/24 1006    Order Status: Completed Specimen: Blood from Peripheral, Hand, Left Updated: 11/03/24 1717     Blood Culture, Routine No Growth to date    Narrative:      Aerobic " and anaerobic    Aerobic culture [[id number]] Collected: 11/03/24 1611    Order Status: Sent Specimen: Wound from Breast, Left Updated: 11/03/24 1622    Gram stain [[id number]] Collected: 11/03/24 1611    Order Status: Sent Specimen: Wound from Breast, Left Updated: 11/03/24 1621            CURRENT/PREVIOUS VISIT EKG  Results for orders placed or performed in visit on 02/14/24   IN OFFICE EKG 12-LEAD (to [place])    Collection Time: 02/14/24 10:54 AM   Result Value Ref Range    QRS Duration 86 ms    [facility] QTC Calculation 439 ms    Narrative    Test Reason : R06.09,R07.89,R00.2,    Vent. Rate : 077 BPM     Atrial Rate : 077 BPM     P-R Int : 158 ms          QRS Dur : 086 ms      QT Int : 388 ms       P-R-T Axes : 071 022 071 degrees     QTc Int : 439 ms    Normal sinus rhythm  Normal ECG  When compared with ECG of 27-MAY-2021 13:24,  No significant change was found  Confirmed by Alan Nicholas MD ([id number]) on 2/14/2024 12:47:28 PM    Referred By: Harpreet Gonsalez           Confirmed By:Alan Nicholas MD     Significant Imaging: I have reviewed all relevant and available imaging results/findings within the past 24 hours.    I spent a total of 65 minutes on the day of the visit.This includes face to face time and non-face to face time preparing to see the patient (eg, review of tests), obtaining and/or reviewing separately obtained history, documenting clinical information in the electronic or other health record, independently interpreting results and communicating results to the patient/family/caregiver, or care coordinator.    Destin Álvarez MD  Date of Service: 11/03/2024      This note was created using [employer] voice recognition software that occasionally misinterpreted phrases or words.

## 2024-11-04 NOTE — PROGRESS NOTES
[facility] Medicine  Progress Note    Patient Name: Paula Hernadez  MRN: [id number]  Patient Class: IP- Inpatient   Admission Date: 11/3/2024  Length of Stay: 1 days  Attending Physician: Shanna Fernandez MD  Primary Care Provider: Harpreet Gonsalez Jr., MD        Subjective:     Principal Problem:Cellulitis of left breast        HPI:  64-year-old female with PMH of breast cancer status post lumpectomy in October of 2021, followed by radiation who presented to the ER bec of left breast cellulitis. Per pt, she underwent reconstruction surgery of her left breast in April of 2024, but the surgical site never healed due to radiation.  At that time, the plastic surgeon decided to proceed with a skin graft which was done in June of 2024, but this was met with a rejection.  A 2nd skin graft was done on July 20, 2024, but again 50% of the skin graft got rejected.  At that time, plastic surgeon recommended a 3rd skin graft, but patient refused and requested wound care instead.  She was doing fine with the wound care, wound was healing except for a hole underneath the left breast.  Yesterday after having a shower, she noted redness, and swelling of the left breast and it was warm to touch.  Today she woke up with worsening symptoms, so she decided to come to the ER.  She denied any fever or chills.    In the ER, vitals showed blood pressure of 120/58, heart rate of 74, respiratory rate of 18, afebrile satting 98% on room air.  CBC showed normocytic anemia with hemoglobin of 11.5.  CBC is within normal limits.  Procalcitonin is within normal limits.  UA is negative.  Blood cultures were collected.  Given the extent of the cellulitis in the swelling of her left breast, CT chest with IV contrast was done which showed Left breast soft tissue gas and edema, with no findings of soft tissue abscess. Pt was given ceftriaxone and vanco. Pt will be admitted to medicine for further care.    Overview/Hospital  Course:  Patient admitted and placed on IV abx. Wound care and ID consulted.     Interval History: States appearance has improved from yesterday.    Review of Systems   Constitutional:  Negative for activity change, appetite change, chills and fever.   HENT:  Negative for congestion, ear pain, nosebleeds and sinus pain.    Eyes:  Negative for discharge and itching.   Respiratory:  Negative for apnea, cough, chest tightness and shortness of breath.    Cardiovascular:  Negative for chest pain, palpitations and leg swelling.   Gastrointestinal:  Negative for abdominal distention, abdominal pain and vomiting.   Genitourinary:  Negative for difficulty urinating, dysuria, flank pain and frequency.   Musculoskeletal:  Negative for arthralgias, back pain, joint swelling and myalgias.   Skin:  Positive for color change and wound. Negative for pallor and rash.        Erythema of left breast with drainage from chronic wound.   Neurological:  Negative for dizziness, weakness, light-headedness and headaches.   Psychiatric/Behavioral:  Negative for agitation, behavioral problems, confusion and suicidal ideas.      Objective:     Vital Signs (Most Recent):  Temp: 98.2 °F (36.8 °C) (11/04/24 0822)  Pulse: 70 (11/04/24 0822)  Resp: 18 (11/04/24 0822)  BP: 139/71 (11/04/24 0822)  SpO2: 99 % (11/04/24 0822) Vital Signs (24h Range):  Temp:  [97.6 °F (36.4 °C)-98.2 °F (36.8 °C)] 98.2 °F (36.8 °C)  Pulse:  [62-72] 70  Resp:  [17-18] 18  SpO2:  [98 %-100 %] 99 %  BP: ()/(51-79) 139/71     Weight: 96.4 kg (212 lb 8.4 oz)  Body mass index is 32.31 kg/m².    Intake/Output Summary (Last 24 hours) at 11/4/2024 1250  Last data filed at 11/4/2024 0908  Gross per 24 hour   Intake 1800 ml   Output 1 ml   Net 1799 ml         Physical Exam  Vitals reviewed.   Constitutional:       General: She is not in acute distress.     Appearance: Normal appearance. She is normal weight. She is not ill-appearing.   HENT:      Head: Normocephalic and  atraumatic.      Nose: Nose normal.      Mouth/Throat:      Mouth: Mucous membranes are moist.      Pharynx: Oropharynx is clear.   Eyes:      General: No scleral icterus.     Extraocular Movements: Extraocular movements intact.      Conjunctiva/sclera: Conjunctivae normal.      Pupils: Pupils are equal, round, and reactive to light.   Cardiovascular:      Rate and Rhythm: Normal rate and regular rhythm.      Pulses: Normal pulses.      Heart sounds: Normal heart sounds. No murmur heard.     No gallop.   Pulmonary:      Effort: Pulmonary effort is normal. No respiratory distress.      Breath sounds: Normal breath sounds. No wheezing, rhonchi or rales.   Chest:      Chest wall: No tenderness.   Abdominal:      General: Abdomen is flat. There is no distension.      Palpations: Abdomen is soft.      Tenderness: There is no abdominal tenderness.   Musculoskeletal:         General: No swelling or tenderness.      Cervical back: Normal range of motion and neck supple. No rigidity or tenderness.      Right lower leg: No edema.      Left lower leg: No edema.   Skin:     General: Skin is warm and dry.      Comments: Swelling and significant cellulitis of left breast, with erythema extending to the clavicle, left mid-axillary region and substernal region. Nonhealing wound noted.   Neurological:      General: No focal deficit present.      Mental Status: She is alert and oriented to person, place, and time. Mental status is at baseline.      Motor: No weakness.   Psychiatric:         Mood and Affect: Mood normal.         Behavior: Behavior normal.         Thought Content: Thought content normal.             Significant Labs: All pertinent labs within the past 24 hours have been reviewed.    Significant Imaging: I have reviewed all pertinent imaging results/findings within the past 24 hours.    Assessment/Plan:      * Cellulitis of left breast  - Extensive cellulitis of the left breast. Suspect the source of infection to be the  non healing wound at one of the prior surgical site.  - CT chest without contrast showed Left breast soft tissue gas and edema, with no findings of soft tissue abscess.  Case was discussed with the ER provider in detail.  Suspicion that the gas is coming from the non heading wound at the surgical side, and not because of necrotizing fasciitis.  - Patient was started on vanc and ceftriaxone which I will continue.  - ID has seen patient and made recommendations.  - Wound care following.        Gastroesophageal reflux disease without esophagitis  PPI.      Onychomycosis  Continue fluconazole weekly      Hot flashes  Continue Effexor        VTE Risk Mitigation (From admission, onward)           Ordered     enoxaparin injection 40 mg  Daily         11/03/24 1237     IP VTE HIGH RISK PATIENT  Once         11/03/24 1237     Place sequential compression device  Until discontinued         11/03/24 1237                    Discharge Planning   EMILY: 11/6/2024     Code Status: Full Code   Is the patient medically ready for discharge?:     Reason for patient still in hospital (select all that apply): Patient trending condition  Discharge Plan A: Home                  Shanna Fernandez MD, MD  Department of Hospital Medicine   [facility]

## 2024-11-04 NOTE — PROGRESS NOTES
Pharmacokinetic Assessment: IV Vancomycin     Vancomycin Day # 2    Indication & Goal: Skin & Soft Tissue infection - Trough 10 - 15    Patient Labs  96.4 kg (212 lb 8.4 oz)  Recent Labs   Lab 11/03/24  1004 11/04/24  0507   WBC 3.95 3.87*   CREATININE 0.6 0.6    Estimated Creatinine Clearance: 115 mL/min (based on SCr of 0.6 mg/dL).    Dialysis N/A    Drug levels (last 3 results):  Recent Labs   Lab Result Units 11/04/24  1000   Vancomycin-Trough ug/mL 11.7       Assessment:   Level was taken approximately 11 hours post dose of 1750 mg on 11/4/24.  The trough level was drawn correctly and can be used to guide therapy at this time. The measurement is within the desired definitive target range of 10 to 15 mcg/mL.    Plan:  - Continue regimen of Vancomycin 1750 mg IV every 12 hours.  - Vancomycin Trough due on 11/5 at 2200    Solis Mckeon, PharmD 11/04/2024 11:16 AM

## 2024-11-04 NOTE — CONSULTS
Chief complaint:  Wound Check (Patient had lumpectomy 10/2022 then multiple surgeries this year - was changing the dressing yesterday and found that wound is now red and swollen and warm to touch, denies drainage - patient is not on ABX but would like the wound checked. )      HPI:  Paula Hernadez is a 64 y.o. female presenting with a left breast surgical wound. Pt has had 2 grafts with both failing. Pt is known to me from OP clinic, and was seen last week with no s/s of infection. No other complaints today    PMH:  As per HPI and below:  Past Medical History:   Diagnosis Date    Arthritis     Breast cancer     Chronic constipation     Chronic insomnia     Floaters in visual field     GERD (gastroesophageal reflux disease)     Seasonal allergies     Seborrheic keratoses     Thumb pain 2018       Social History     Socioeconomic History    Marital status:     Number of children: 1   Occupational History    Occupation: clerical   Tobacco Use    Smoking status: Former     Current packs/day: 0.00     Average packs/day: 0.3 packs/day for 27.8 years (6.9 ttl pk-yrs)     Types: Cigarettes     Start date:      Quit date: 10/6/2002     Years since quittin.0    Smokeless tobacco: Never   Substance and Sexual Activity    Alcohol use: Yes     Alcohol/week: 4.0 standard drinks of alcohol     Types: 4 Standard drinks or equivalent per week     Comment: daiquiris, weekly    Drug use: Yes     Frequency: 2.0 times per week     Types: Marijuana     Comment: thc gummies    Sexual activity: Yes     Partners: Female     Birth control/protection: Post-menopausal   Social History Narrative    Live with      Social Drivers of Health     Financial Resource Strain: Low Risk  (11/3/2024)    Overall Financial Resource Strain (CARDIA)     Difficulty of Paying Living Expenses: Not hard at all   Food Insecurity: No Food Insecurity (11/3/2024)    Hunger Vital Sign     Worried About Running Out of Food in the Last Year:  Never true     Ran Out of Food in the Last Year: Never true   Transportation Needs: No Transportation Needs (11/3/2024)    TRANSPORTATION NEEDS     Transportation : No   Physical Activity: Unknown (12/18/2023)    Exercise Vital Sign     Days of Exercise per Week: 0 days   Stress: No Stress Concern Present (11/3/2024)    [employer] - Occupational Stress Questionnaire     Feeling of Stress : Only a little   Housing Stability: Low Risk  (11/3/2024)    Housing Stability Vital Sign     Unable to Pay for Housing in the Last Year: No     Homeless in the Last Year: No       Past Surgical History:   Procedure Laterality Date    ABDOMINOPLASTY      BREAST BIOPSY      BREAST MASS EXCISION      COSMETIC SURGERY      tummy tuck    CYST REMOVAL  05/27/2021    chest    INTERPOSITION ARTHROPLASTY OF CARPOMETACARPAL JOINTS Left 06/09/2021    Procedure: INTERPOSITION ARTHROPLASTY, CMC JOINT;  Surgeon: Giovani Molina MD;  Location: [facility];  Service: Orthopedics;  Laterality: Left;    LASIK Bilateral     LIPOSUCTION W/ FAT INJECTION Bilateral 04/09/2024    Procedure: LIPOSUCTION, WITH FAT TRANSFER;  Surgeon: Yuniel Cadena MD;  Location: [facility];  Service: Plastics;  Laterality: Bilateral;    MASTECTOMY, PARTIAL Left 10/19/2021    Procedure: MASTECTOMY, PARTIAL;  Surgeon: Lila Perales MD;  Location: [facility] OR;  Service: General;  Laterality: Left;    MASTOPEXY Bilateral 04/09/2024    Procedure: MASTOPEXY;  Surgeon: Yuniel Cadena MD;  Location: [facility] OR;  Service: Plastics;  Laterality: Bilateral;    NEEDLE LOCALIZATION Left 10/19/2021    Procedure: NEEDLE LOCALIZATION;  Surgeon: Lila Perales MD;  Location: [facility] OR;  Service: General;  Laterality: Left;  LOCALIZER DONE 10/15    SHOULDER SURGERY Left     bone spur    SKIN FULL THICKNESS GRAFT Bilateral 6/18/2024    Procedure: APPLICATION, GRAFT, SKIN, FULL-THICKNESS;  Surgeon: Yuniel Cadena MD;  Location: [facility];  Service: Plastics;  Laterality:  Bilateral;    THUMB ARTHROSCOPY Left     TONSILLECTOMY         Family History   Problem Relation Name Age of Onset    Stroke Mother      Prostate cancer Father      Melanoma Neg Hx      Psoriasis Neg Hx      Lupus Neg Hx      Eczema Neg Hx         Review of patient's allergies indicates:   Allergen Reactions    Codeine Nausea Only       No current facility-administered medications on file prior to encounter.     Current Outpatient Medications on File Prior to Encounter   Medication Sig Dispense Refill    anastrozole (ARIMIDEX) 1 mg Tab Take 1 tablet (1 mg total) by mouth once daily. 90 tablet 3    omeprazole (PRILOSEC) 20 MG capsule Take 20 mg by mouth once daily.       venlafaxine (EFFEXOR-XR) 37.5 MG 24 hr capsule Take 1 capsule (37.5 mg total) by mouth once daily. (Patient taking differently: Take 75 mg by mouth every evening.) 90 capsule 3    acetaminophen (TYLENOL) 500 MG tablet Take 1,000 mg by mouth every 6 (six) hours as needed for Pain.      docusate sodium (STOOL SOFTENER) 100 mg capsule Take 100 mg by mouth 2 (two) times daily.      fluconazole (DIFLUCAN) 200 MG Tab Take 1 tablet (200 mg total) by mouth once a week. for 28 doses 28 tablet 0    triamcinolone acetonide 0.1% (KENALOG) 0.1 % cream Apply topically 2 (two) times daily. 80 g 2       ROS: As per HPI and below:  Pertinent items are noted in HPI.      Physical Exam:     Vitals:    24 0011 24 0453 24 0822 24 1100   BP: (!) 144/79 119/60 139/71 136/72   BP Location: Right arm Right arm     Patient Position: Lying Lying     Pulse: 66 62 70 63   Resp: 17 18 18 18   Temp: 97.6 °F (36.4 °C) 97.6 °F (36.4 °C) 98.2 °F (36.8 °C) 97.9 °F (36.6 °C)   TempSrc: Oral Oral Oral Oral   SpO2: 99% 98% 99% 100%   Weight:       Height:           BP  Min: 93/57  Max: 145/68  Temp  Av.9 °F (36.6 °C)  Min: 97.6 °F (36.4 °C)  Max: 98.2 °F (36.8 °C)  Pulse  Av.6  Min: 62  Max: 74  Resp  Av.7  Min: 17  Max: 18  SpO2  Av.9 %  Min:  "98 %  Max: 100 %  Height  Av' 8" (172.7 cm)  Min: 5' 8" (172.7 cm)  Max: 5' 8" (172.7 cm)  Weight  Av.7 kg (208 lb 12.2 oz)  Min: 93 kg (205 lb)  Max: 96.4 kg (212 lb 8.4 oz)    Body mass index is 32.31 kg/m².          General:             Well developed, well nourished, no apparent distress  HEENT:              NCAT, no JVD, mucous membranes moist, EOM intact  Cardiovascular:  Regular rate and rhythm, normal S1, normal S2, No murmurs, rubs, or gallops  Respiratory:        Normal breath sounds, no wheezes, no rales, no rhonchi  Abdomen:           Bowel sounds present, non tender, non distended, no masses, no hepatojugular reflux  Extremities:        No clubbing, no cyanosis, no edema  Vascular:            2+ b/l radial.  Peripheral pulses intact.  No carotid bruits.  Neurological:      No focal deficits  Skin:                   No obvious rashes or erythema, Left breast surgical wound                        Lab Results   Component Value Date    WBC 3.87 (L) 2024    HGB 12.0 2024    HCT 38.0 2024    MCV 89 2024     2024     Lab Results   Component Value Date    CHOL 246 (H) 10/19/2023    CHOL 200 (H) 2018     Lab Results   Component Value Date    HDL 87 10/19/2023    HDL 88 2018     Lab Results   Component Value Date    LDLCALC 131 (H) 10/19/2023    LDLCALC 92 2018     Lab Results   Component Value Date    TRIG 160 (H) 10/19/2023    TRIG 102 2018     No results found for: "CHOLHDL"  CMP  Recent Labs   Lab 24  0507   GLU 93   CALCIUM 9.2   ALBUMIN 3.7   PROT 6.3      K 4.2   CO2 29      BUN 19   CREATININE 0.6   ALKPHOS 97   ALT 12   AST 21   BILITOT 0.4      Lab Results   Component Value Date    TSH 2.231 2024           Assessment and Recommendations       Diagnoses:    Left breast surgical wound    Plan:  Hydrofera Blue daily to the left breast    Complexity:  Moderate      "

## 2024-11-04 NOTE — PLAN OF CARE
[facility]  Initial Discharge Assessment       Primary Care Provider: Harpreet Gonsalez Jr., MD    Admission Diagnosis: Cellulitis of left breast [N61.0]    Admission Date: 11/3/2024  Expected Discharge Date: 11/6/2024    Transition of Care Barriers: None    Payor: BLUE CROSS BLUE SHIELD / Plan: BCBS ALL OUT OF STATE / Product Type: PPO /     Extended Emergency Contact Information  Primary Emergency Contact: Logan Hernadez  Address: [address], [place] [id number] [place] [address]  Home Phone: [phone]  Mobile Phone: [phone]  Relation: Spouse  Preferred language: English   needed? No    Discharge Plan A: Home  Discharge Plan B: Home with family      [id number] - [place], [place] - [address]  [address] [address]  Phone: [phone] Fax: [phone]    [id number] [place]  [address]  AT [address] [address]  Phone: [phone] Fax: [phone]    Assessment completed at bedside with patient and . Patient lives with , is independent of all adls/drives herself. Patient denies any HH/HD/DME needs at this time. NOK is her  Logan.     Initial Assessment (most recent)       Adult Discharge Assessment - 11/04/24 1037          Discharge Assessment    Assessment Type Discharge Planning Assessment     Confirmed/corrected address, phone number and insurance Yes     Confirmed Demographics Correct on Facesheet     Source of Information patient     When was your last doctors appointment? 10/30/24     Reason For Admission cellulitis of left breast     People in Home spouse     Facility Arrived From: home     Do you expect to return to your current living situation? Yes     Do you have help at home or someone to help you manage your care at home? Yes     Who are your caregiver(s) and their phone number(s)? spouse Logan     Prior to hospitilization cognitive status:  Alert/Oriented     Current cognitive status: Alert/Oriented     Walking or Climbing Stairs Difficulty no     Dressing/Bathing Difficulty no     Equipment Currently Used at Home none     Readmission within 30 days? No     Patient currently being followed by outpatient case management? No     Do you currently have service(s) that help you manage your care at home? No     Do you take prescription medications? Yes     Do you have prescription coverage? Yes     Coverage bcbs     Do you have any problems affording any of your prescribed medications? No     Is the patient taking medications as prescribed? yes     Who is going to help you get home at discharge? Logan- spouse     How do you get to doctors appointments? family or friend will provide     Are you on dialysis? No     Do you take coumadin? No     Discharge Plan A Home     Discharge Plan B Home with family     DME Needed Upon Discharge  none     Discharge Plan discussed with: Patient;Spouse/sig other     Transition of Care Barriers None

## 2024-11-04 NOTE — SUBJECTIVE & OBJECTIVE
Interval History: States appearance has improved from yesterday.    Review of Systems   Constitutional:  Negative for activity change, appetite change, chills and fever.   HENT:  Negative for congestion, ear pain, nosebleeds and sinus pain.    Eyes:  Negative for discharge and itching.   Respiratory:  Negative for apnea, cough, chest tightness and shortness of breath.    Cardiovascular:  Negative for chest pain, palpitations and leg swelling.   Gastrointestinal:  Negative for abdominal distention, abdominal pain and vomiting.   Genitourinary:  Negative for difficulty urinating, dysuria, flank pain and frequency.   Musculoskeletal:  Negative for arthralgias, back pain, joint swelling and myalgias.   Skin:  Positive for color change and wound. Negative for pallor and rash.        Erythema of left breast with drainage from chronic wound.   Neurological:  Negative for dizziness, weakness, light-headedness and headaches.   Psychiatric/Behavioral:  Negative for agitation, behavioral problems, confusion and suicidal ideas.      Objective:     Vital Signs (Most Recent):  Temp: 98.2 °F (36.8 °C) (11/04/24 0822)  Pulse: 70 (11/04/24 0822)  Resp: 18 (11/04/24 0822)  BP: 139/71 (11/04/24 0822)  SpO2: 99 % (11/04/24 0822) Vital Signs (24h Range):  Temp:  [97.6 °F (36.4 °C)-98.2 °F (36.8 °C)] 98.2 °F (36.8 °C)  Pulse:  [62-72] 70  Resp:  [17-18] 18  SpO2:  [98 %-100 %] 99 %  BP: ()/(51-79) 139/71     Weight: 96.4 kg (212 lb 8.4 oz)  Body mass index is 32.31 kg/m².    Intake/Output Summary (Last 24 hours) at 11/4/2024 1250  Last data filed at 11/4/2024 0908  Gross per 24 hour   Intake 1800 ml   Output 1 ml   Net 1799 ml         Physical Exam  Vitals reviewed.   Constitutional:       General: She is not in acute distress.     Appearance: Normal appearance. She is normal weight. She is not ill-appearing.   HENT:      Head: Normocephalic and atraumatic.      Nose: Nose normal.      Mouth/Throat:      Mouth: Mucous membranes are  moist.      Pharynx: Oropharynx is clear.   Eyes:      General: No scleral icterus.     Extraocular Movements: Extraocular movements intact.      Conjunctiva/sclera: Conjunctivae normal.      Pupils: Pupils are equal, round, and reactive to light.   Cardiovascular:      Rate and Rhythm: Normal rate and regular rhythm.      Pulses: Normal pulses.      Heart sounds: Normal heart sounds. No murmur heard.     No gallop.   Pulmonary:      Effort: Pulmonary effort is normal. No respiratory distress.      Breath sounds: Normal breath sounds. No wheezing, rhonchi or rales.   Chest:      Chest wall: No tenderness.   Abdominal:      General: Abdomen is flat. There is no distension.      Palpations: Abdomen is soft.      Tenderness: There is no abdominal tenderness.   Musculoskeletal:         General: No swelling or tenderness.      Cervical back: Normal range of motion and neck supple. No rigidity or tenderness.      Right lower leg: No edema.      Left lower leg: No edema.   Skin:     General: Skin is warm and dry.      Comments: Swelling and significant cellulitis of left breast, with erythema extending to the clavicle, left mid-axillary region and substernal region. Nonhealing wound noted.   Neurological:      General: No focal deficit present.      Mental Status: She is alert and oriented to person, place, and time. Mental status is at baseline.      Motor: No weakness.   Psychiatric:         Mood and Affect: Mood normal.         Behavior: Behavior normal.         Thought Content: Thought content normal.             Significant Labs: All pertinent labs within the past 24 hours have been reviewed.    Significant Imaging: I have reviewed all pertinent imaging results/findings within the past 24 hours.

## 2024-11-05 LAB
ALBUMIN SERPL BCP-MCNC: 3.7 G/DL (ref 3.5–5.2)
ALP SERPL-CCNC: 85 U/L (ref 55–135)
ALT SERPL W/O P-5'-P-CCNC: 13 U/L (ref 10–44)
ANION GAP SERPL CALC-SCNC: 2 MMOL/L (ref 8–16)
AST SERPL-CCNC: 21 U/L (ref 10–40)
BASOPHILS # BLD AUTO: 0.02 K/UL (ref 0–0.2)
BASOPHILS NFR BLD: 0.6 % (ref 0–1.9)
BILIRUB SERPL-MCNC: 0.4 MG/DL (ref 0.1–1)
BUN SERPL-MCNC: 12 MG/DL (ref 8–23)
CALCIUM SERPL-MCNC: 8.5 MG/DL (ref 8.7–10.5)
CHLORIDE SERPL-SCNC: 109 MMOL/L (ref 95–110)
CO2 SERPL-SCNC: 29 MMOL/L (ref 23–29)
CREAT SERPL-MCNC: 0.7 MG/DL (ref 0.5–1.4)
DIFFERENTIAL METHOD BLD: ABNORMAL
EOSINOPHIL # BLD AUTO: 0.2 K/UL (ref 0–0.5)
EOSINOPHIL NFR BLD: 4.5 % (ref 0–8)
ERYTHROCYTE [DISTWIDTH] IN BLOOD BY AUTOMATED COUNT: 15.7 % (ref 11.5–14.5)
EST. GFR  (NO RACE VARIABLE): >60 ML/MIN/1.73 M^2
GLUCOSE SERPL-MCNC: 129 MG/DL (ref 70–110)
HCT VFR BLD AUTO: 37.4 % (ref 37–48.5)
HGB BLD-MCNC: 12 G/DL (ref 12–16)
IMM GRANULOCYTES # BLD AUTO: 0.01 K/UL (ref 0–0.04)
IMM GRANULOCYTES NFR BLD AUTO: 0.3 % (ref 0–0.5)
LYMPHOCYTES # BLD AUTO: 1.1 K/UL (ref 1–4.8)
LYMPHOCYTES NFR BLD: 31.8 % (ref 18–48)
MAGNESIUM SERPL-MCNC: 1.9 MG/DL (ref 1.6–2.6)
MCH RBC QN AUTO: 28.6 PG (ref 27–31)
MCHC RBC AUTO-ENTMCNC: 32.1 G/DL (ref 32–36)
MCV RBC AUTO: 89 FL (ref 82–98)
MONOCYTES # BLD AUTO: 0.3 K/UL (ref 0.3–1)
MONOCYTES NFR BLD: 7.6 % (ref 4–15)
NEUTROPHILS # BLD AUTO: 1.8 K/UL (ref 1.8–7.7)
NEUTROPHILS NFR BLD: 55.2 % (ref 38–73)
NRBC BLD-RTO: 0 /100 WBC
PLATELET # BLD AUTO: 177 K/UL (ref 150–450)
PMV BLD AUTO: 9.3 FL (ref 9.2–12.9)
POTASSIUM SERPL-SCNC: 4.1 MMOL/L (ref 3.5–5.1)
PROT SERPL-MCNC: 6.5 G/DL (ref 6–8.4)
RBC # BLD AUTO: 4.19 M/UL (ref 4–5.4)
SODIUM SERPL-SCNC: 140 MMOL/L (ref 136–145)
VANCOMYCIN TROUGH SERPL-MCNC: 17.4 UG/ML
WBC # BLD AUTO: 3.3 K/UL (ref 3.9–12.7)

## 2024-11-05 PROCEDURE — 36415 COLL VENOUS BLD VENIPUNCTURE: CPT | Performed by: INTERNAL MEDICINE

## 2024-11-05 PROCEDURE — 80053 COMPREHEN METABOLIC PANEL: CPT | Performed by: HOSPITALIST

## 2024-11-05 PROCEDURE — 85025 COMPLETE CBC W/AUTO DIFF WBC: CPT | Performed by: HOSPITALIST

## 2024-11-05 PROCEDURE — 36415 COLL VENOUS BLD VENIPUNCTURE: CPT | Performed by: HOSPITALIST

## 2024-11-05 PROCEDURE — 63600175 PHARM REV CODE 636 W HCPCS: Performed by: HOSPITALIST

## 2024-11-05 PROCEDURE — 12000002 HC ACUTE/MED SURGE SEMI-PRIVATE ROOM

## 2024-11-05 PROCEDURE — 25000003 PHARM REV CODE 250: Performed by: INTERNAL MEDICINE

## 2024-11-05 PROCEDURE — 80202 ASSAY OF VANCOMYCIN: CPT | Performed by: INTERNAL MEDICINE

## 2024-11-05 PROCEDURE — 99233 SBSQ HOSP IP/OBS HIGH 50: CPT | Mod: ,,, | Performed by: INTERNAL MEDICINE

## 2024-11-05 PROCEDURE — 83735 ASSAY OF MAGNESIUM: CPT | Performed by: HOSPITALIST

## 2024-11-05 PROCEDURE — 25000003 PHARM REV CODE 250: Performed by: HOSPITALIST

## 2024-11-05 RX ORDER — LACTULOSE 10 G/15ML
20 SOLUTION ORAL EVERY 6 HOURS PRN
Status: DISCONTINUED | OUTPATIENT
Start: 2024-11-05 | End: 2024-11-06 | Stop reason: HOSPADM

## 2024-11-05 RX ORDER — LINEZOLID 600 MG/1
600 TABLET, FILM COATED ORAL EVERY 12 HOURS
Status: DISCONTINUED | OUTPATIENT
Start: 2024-11-05 | End: 2024-11-06 | Stop reason: HOSPADM

## 2024-11-05 RX ADMIN — CEFTRIAXONE SODIUM 2 G: 2 INJECTION, POWDER, FOR SOLUTION INTRAMUSCULAR; INTRAVENOUS at 10:11

## 2024-11-05 RX ADMIN — LINEZOLID 600 MG: 600 TABLET, FILM COATED ORAL at 09:11

## 2024-11-05 RX ADMIN — LACTULOSE 20 G: 20 SOLUTION ORAL at 04:11

## 2024-11-05 RX ADMIN — PANTOPRAZOLE SODIUM 40 MG: 40 TABLET, DELAYED RELEASE ORAL at 05:11

## 2024-11-05 RX ADMIN — VENLAFAXINE HYDROCHLORIDE 37.5 MG: 37.5 CAPSULE, EXTENDED RELEASE ORAL at 10:11

## 2024-11-05 RX ADMIN — ACETAMINOPHEN 650 MG: 325 TABLET ORAL at 09:11

## 2024-11-05 RX ADMIN — VANCOMYCIN HYDROCHLORIDE 1750 MG: 5 INJECTION, POWDER, LYOPHILIZED, FOR SOLUTION INTRAVENOUS at 11:11

## 2024-11-05 RX ADMIN — ENOXAPARIN SODIUM 40 MG: 40 INJECTION SUBCUTANEOUS at 05:11

## 2024-11-05 NOTE — CONSULTS
Wound care consult completed by Pema Dwyer and Dr. Gray. Wound care team to follow up throughout hospital stay.

## 2024-11-05 NOTE — PROGRESS NOTES
Formerly [facility], later [facility] Medicine  Progress Note    Patient Name: Paula Hernadez  MRN: [id number]  Patient Class: IP- Inpatient   Admission Date: 11/3/2024  Length of Stay: 2 days  Attending Physician: Shanna Fernandez MD  Primary Care Provider: Harpreet Gonsalez Jr., MD        Subjective:     Principal Problem:Cellulitis of left breast        HPI:  64-year-old female with PMH of breast cancer status post lumpectomy in October of 2021, followed by radiation who presented to the ER bec of left breast cellulitis. Per pt, she underwent reconstruction surgery of her left breast in April of 2024, but the surgical site never healed due to radiation.  At that time, the plastic surgeon decided to proceed with a skin graft which was done in June of 2024, but this was met with a rejection.  A 2nd skin graft was done on July 20, 2024, but again 50% of the skin graft got rejected.  At that time, plastic surgeon recommended a 3rd skin graft, but patient refused and requested wound care instead.  She was doing fine with the wound care, wound was healing except for a hole underneath the left breast.  Yesterday after having a shower, she noted redness, and swelling of the left breast and it was warm to touch.  Today she woke up with worsening symptoms, so she decided to come to the ER.  She denied any fever or chills.    In the ER, vitals showed blood pressure of 120/58, heart rate of 74, respiratory rate of 18, afebrile satting 98% on room air.  CBC showed normocytic anemia with hemoglobin of 11.5.  CBC is within normal limits.  Procalcitonin is within normal limits.  UA is negative.  Blood cultures were collected.  Given the extent of the cellulitis in the swelling of her left breast, CT chest with IV contrast was done which showed Left breast soft tissue gas and edema, with no findings of soft tissue abscess. Pt was given ceftriaxone and vanco. Pt will be admitted to medicine for further care.    Overview/Hospital  Course:  Patient admitted and placed on IV abx. Wound care and ID consulted. ID made recommendations for iv abx, and planned to make recommendations for oral abx at discharge.    Interval History: Appearance has improved from yesterday.    Review of Systems   Constitutional:  Negative for activity change, appetite change, chills and fever.   HENT:  Negative for congestion, ear pain, nosebleeds and sinus pain.    Eyes:  Negative for discharge and itching.   Respiratory:  Negative for apnea, cough, chest tightness and shortness of breath.    Cardiovascular:  Negative for chest pain, palpitations and leg swelling.   Gastrointestinal:  Negative for abdominal distention, abdominal pain and vomiting.   Genitourinary:  Negative for difficulty urinating, dysuria, flank pain and frequency.   Musculoskeletal:  Negative for arthralgias, back pain, joint swelling and myalgias.   Skin:  Positive for color change and wound. Negative for pallor and rash.        Erythema of left breast with drainage from chronic wound.   Neurological:  Negative for dizziness, weakness, light-headedness and headaches.   Psychiatric/Behavioral:  Negative for agitation, behavioral problems, confusion and suicidal ideas.      Objective:     Vital Signs (Most Recent):  Temp: 97.7 °F (36.5 °C) (11/05/24 1204)  Pulse: 70 (11/05/24 1204)  Resp: 18 (11/05/24 1204)  BP: (!) 155/74 (11/05/24 1204)  SpO2: 100 % (11/05/24 1204) Vital Signs (24h Range):  Temp:  [97.5 °F (36.4 °C)-98.1 °F (36.7 °C)] 97.7 °F (36.5 °C)  Pulse:  [58-70] 70  Resp:  [16-18] 18  SpO2:  [97 %-100 %] 100 %  BP: (124-161)/(72-80) 155/74     Weight: 96.4 kg (212 lb 8.4 oz)  Body mass index is 32.31 kg/m².    Intake/Output Summary (Last 24 hours) at 11/5/2024 1316  Last data filed at 11/5/2024 0930  Gross per 24 hour   Intake 1460 ml   Output --   Net 1460 ml         Physical Exam  Vitals reviewed.   Constitutional:       General: She is not in acute distress.     Appearance: Normal  appearance. She is normal weight. She is not ill-appearing.   HENT:      Head: Normocephalic and atraumatic.      Nose: Nose normal.      Mouth/Throat:      Mouth: Mucous membranes are moist.      Pharynx: Oropharynx is clear.   Eyes:      General: No scleral icterus.     Extraocular Movements: Extraocular movements intact.      Conjunctiva/sclera: Conjunctivae normal.      Pupils: Pupils are equal, round, and reactive to light.   Cardiovascular:      Rate and Rhythm: Normal rate and regular rhythm.      Pulses: Normal pulses.      Heart sounds: Normal heart sounds. No murmur heard.     No gallop.   Pulmonary:      Effort: Pulmonary effort is normal. No respiratory distress.      Breath sounds: Normal breath sounds. No wheezing, rhonchi or rales.   Chest:      Chest wall: No tenderness.   Abdominal:      General: Abdomen is flat. There is no distension.      Palpations: Abdomen is soft.      Tenderness: There is no abdominal tenderness.   Musculoskeletal:         General: No swelling or tenderness.      Cervical back: Normal range of motion and neck supple. No rigidity or tenderness.      Right lower leg: No edema.      Left lower leg: No edema.   Skin:     General: Skin is warm and dry.      Comments: Swelling and significant cellulitis of left breast, with erythema extending to the clavicle, left mid-axillary region and substernal region. Nonhealing wound noted.   Neurological:      General: No focal deficit present.      Mental Status: She is alert and oriented to person, place, and time. Mental status is at baseline.      Motor: No weakness.   Psychiatric:         Mood and Affect: Mood normal.         Behavior: Behavior normal.         Thought Content: Thought content normal.             Significant Labs: All pertinent labs within the past 24 hours have been reviewed.    Significant Imaging: I have reviewed all pertinent imaging results/findings within the past 24 hours.    Assessment/Plan:      * Cellulitis of  left breast  - Extensive cellulitis of the left breast. Suspect the source of infection to be the non healing wound at one of the prior surgical site.  - CT chest without contrast showed Left breast soft tissue gas and edema, with no findings of soft tissue abscess.  Case was discussed with the ER provider in detail.  Suspicion that the gas is coming from the non heading wound at the surgical side, and not because of necrotizing fasciitis.  - Patient was started on vanc and ceftriaxone which I will continue.  - ID has seen patient and made recommendations.  - Wound care following.        Gastroesophageal reflux disease without esophagitis  PPI.      Onychomycosis  Continue fluconazole weekly      Hot flashes  Continue Effexor        VTE Risk Mitigation (From admission, onward)           Ordered     enoxaparin injection 40 mg  Daily         11/03/24 1237     IP VTE HIGH RISK PATIENT  Once         11/03/24 1237     Place sequential compression device  Until discontinued         11/03/24 1237                    Discharge Planning   EMILY: 11/6/2024     Code Status: Full Code   Is the patient medically ready for discharge?:     Reason for patient still in hospital (select all that apply): Patient trending condition  Discharge Plan A: Home                  Shanna Fernandez MD, MD  Department of Hospital Medicine   [facility]

## 2024-11-05 NOTE — PROGRESS NOTES
[facility]   Department of Infectious Disease  Progress Note        PATIENT NAME: Paula Hernadez  MRN: [id number]  TODAY'S DATE: 11/05/2024  ADMIT DATE: 11/3/2024  LOS: 2 days    CHIEF COMPLAINT: Wound Check (Patient had lumpectomy 10/2022 then multiple surgeries this year - was changing the dressing yesterday and found that wound is now red and swollen and warm to touch, denies drainage - patient is not on ABX but would like the wound checked. )      PRINCIPLE PROBLEM: Cellulitis of left breast    INTERVAL HISTORY      11/04/2024: No acute issues overnight.  Tolerating antibiotics okay.  Redness improving.  Blood and wound culture so far negative.      11/05/2024: Erythema her continued to resolve.  Blood culture and left breast wound culture so far negative.    Antibiotics (From admission, onward)      Start     Stop Route Frequency Ordered    11/04/24 1100  cefTRIAXone injection 2 g         -- IV Every 24 hours (non-standard times) 11/03/24 1458    11/03/24 2300  vancomycin (VANCOCIN) 1,750 mg in D5W 500 mL IVPB         -- IV Every 12 hours (non-standard times) 11/03/24 1648    11/03/24 1724  vancomycin - pharmacy to dose         -- IV pharmacy to manage frequency 11/03/24 1625          Antifungals (From admission, onward)      Start     Stop Route Frequency Ordered    11/06/24 0900  fluconazole tablet 200 mg         -- Oral Weekly 11/03/24 1410           Antivirals (From admission, onward)      None            ASSESSMENT and PLAN      1. Left breast cellulitis in a patient with chronic lower breast wound.  Resolving.  Switch to oral linezolid + ciprofloxacin to complete 10 day course 11/14/24.  Monitor for serotonin syndrome while on linezolid + low-dose venlafaxine      2. Right breast and anterior chest wall cellulitis.  Antibiotics as above.      RECOMMENDATIONS:    DC oral vancomycin  Start linezolid p.o. through 11/14/24   Continue ceftriaxone but switch to oral ciprofloxacin 500 mg p.o.  b.i.d. at discharge through 11/14/2024  Follow up with ID in 2 weeks    Please send Epic secure chat with any questions.      SUBJECTIVE    Paula Hernadez is a 64 y.o. female with history of left partial mastectomy for management of breast cancer followed by radiation therapy.  She then had breast reconstruction in April 20, 2024.  The wound did not heal well due to poor tissue from previous radiation.  She had STSG in [place] bed 20, 2024 to cover the wound but it use the crossmatch was failed.  Then had a 2nd STSG in July 2024 with 50% take.  Has since followed up at [facility] with Dr. Mckeon.     Noted redness of the breast yesterday which got worse.  Presented to the emergency room today and was admitted.  Vitals in the ED unremarkable.  She was admitted and placed on vancomycin and ceftriaxone.  ID asked to assist with her care.     Antibiotic history:    Vancomycin: 11/03/2024-  Ceftriaxone:  11/03/2024-     Microbiology:    Blood culture 11/03/2024: NGTD    Review of Systems  Negative except as stated above in Interval History     OBJECTIVE   Temp:  [97.5 °F (36.4 °C)-98.1 °F (36.7 °C)] 97.7 °F (36.5 °C)  Pulse:  [58-70] 70  Resp:  [16-18] 18  SpO2:  [97 %-100 %] 100 %  BP: (124-161)/(72-80) 155/74  Temp:  [97.5 °F (36.4 °C)-98.1 °F (36.7 °C)]   Temp: 97.7 °F (36.5 °C) (11/05/24 1204)  Pulse: 70 (11/05/24 1204)  Resp: 18 (11/05/24 1204)  BP: (!) 155/74 (11/05/24 1204)  SpO2: 100 % (11/05/24 1204)    Intake/Output Summary (Last 24 hours) at 11/5/2024 1327  Last data filed at 11/5/2024 1323  Gross per 24 hour   Intake 1700 ml   Output --   Net 1700 ml       Physical Exam  General:  Middle-aged man lying quietly in bed in no acute distress   Chest:  Fading erythema bilateral breasts.  Dressing inferior left breast.  Not taken down  CVS: S1 and 2 heard, no murmurs appreciated   Respiratory: Clear to auscultation   Abdomen: Full, soft, nontender, no palpable organomegaly   Skin: No rash appreciated   CNS: No  "focal deficits  Musculoskeletal: No joint or bony abnormalities appreciated  Psych: Good mood, normal affect.      VAD:  PIV  ISOLATION:  None     Wounds:  Left breast wound    Significant Labs: All pertinent labs within the past 24 hours have been reviewed.    CBC LAST 7 DAYS  Recent Labs   Lab 11/03/24  1004 11/04/24  0507 11/05/24  0906   WBC 3.95 3.87* 3.30*   RBC 4.12 4.28 4.19   HGB 11.5* 12.0 12.0   HCT 36.2* 38.0 37.4   MCV 88 89 89   MCH 27.9 28.0 28.6   MCHC 31.8* 31.6* 32.1   RDW 15.6* 15.7* 15.7*    182 177   MPV 9.0* 9.3 9.3   GRAN 58.1  2.3 52.9  2.1 55.2  1.8   LYMPH 26.1  1.0 29.7  1.2 31.8  1.1   MONO 11.9  0.5 10.9  0.4 7.6  0.3   BASO 0.01 0.02 0.02   NRBC 0 0 0       CHEMISTRY LAST 7 DAYS  Recent Labs   Lab 11/03/24  1004 11/04/24  0507 11/05/24  0906    139 140   K 4.1 4.2 4.1    105 109   CO2 31* 29 29   ANIONGAP 2* 5* 2*   BUN 14 19 12   CREATININE 0.6 0.6 0.7   GLU 76 93 129*   CALCIUM 8.7 9.2 8.5*   MG  --  2.0 1.9   ALBUMIN 3.8 3.7 3.7   PROT 6.5 6.3 6.5   ALKPHOS 90 97 85   ALT 13 12 13   AST 21 21 21   BILITOT 0.5 0.4 0.4       Estimated Creatinine Clearance: 98.6 mL/min (based on SCr of 0.7 mg/dL).    INFLAMMATORY/PROCAL  LAST 7 DAYS  Recent Labs   Lab 11/03/24  1004   PROCAL 0.069     No results found for: "ESR"  No results found for: "CRP"    PRIOR  MICROBIOLOGY:    No results found for the last 90 days.      LAST 7 DAYS MICROBIOLOGY   Microbiology Results (last 7 days)       Procedure Component Value Units Date/Time    Blood culture x two cultures. Draw prior to antibiotics. [[id number]] Collected: 11/03/24 1006    Order Status: Completed Specimen: Blood from Peripheral, Hand, Right Updated: 11/05/24 1032     Blood Culture, Routine No Growth to date      No Growth to date      No Growth to date    Narrative:      Aerobic and anaerobic    Blood culture x two cultures. Draw prior to antibiotics. [[id number]] Collected: 11/03/24 1006    Order Status: Completed " Specimen: Blood from Peripheral, Hand, Left Updated: 11/05/24 1032     Blood Culture, Routine No Growth to date      No Growth to date      No Growth to date    Narrative:      Aerobic and anaerobic    Aerobic culture [[id number]] Collected: 11/03/24 1611    Order Status: Completed Specimen: Wound from Breast, Left Updated: 11/05/24 0742     Aerobic Bacterial Culture No growth    Narrative:      Breast, Left    Gram stain [[id number]] Collected: 11/03/24 1611    Order Status: Completed Specimen: Wound from Breast, Left Updated: 11/04/24 0912     Gram Stain Result Few WBC's      No organisms seen    Narrative:      Breast, Left          CURRENT/PREVIOUS VISIT EKG  Results for orders placed or performed in visit on 02/14/24   IN OFFICE EKG 12-LEAD (to [place])    Collection Time: 02/14/24 10:54 AM   Result Value Ref Range    QRS Duration 86 ms    [facility] QTC Calculation 439 ms    Narrative    Test Reason : R06.09,R07.89,R00.2,    Vent. Rate : 077 BPM     Atrial Rate : 077 BPM     P-R Int : 158 ms          QRS Dur : 086 ms      QT Int : 388 ms       P-R-T Axes : 071 022 071 degrees     QTc Int : 439 ms    Normal sinus rhythm  Normal ECG  When compared with ECG of 27-MAY-2021 13:24,  No significant change was found  Confirmed by Alan Nicholas MD ([id number]) on 2/14/2024 12:47:28 PM    Referred By: Harpreet Gonsalez           Confirmed By:Alan Nicholas MD       Significant Imaging: I have reviewed all relevant and available imaging results/findings within the past 24 hours.    I spent a total of 40 minutes on the day of the visit.This includes face to face time and non-face to face time preparing to see the patient (eg, review of tests), obtaining and/or reviewing separately obtained history, documenting clinical information in the electronic or other health record, independently interpreting results and communicating results to the patient/family/caregiver, or care coordinator.    Destin Álvarez MD  Date of Service: 11/05/2024      This note  was created using [employer] voice recognition software that occasionally misinterpreted phrases or words.

## 2024-11-05 NOTE — SUBJECTIVE & OBJECTIVE
Interval History: Appearance has improved from yesterday.    Review of Systems   Constitutional:  Negative for activity change, appetite change, chills and fever.   HENT:  Negative for congestion, ear pain, nosebleeds and sinus pain.    Eyes:  Negative for discharge and itching.   Respiratory:  Negative for apnea, cough, chest tightness and shortness of breath.    Cardiovascular:  Negative for chest pain, palpitations and leg swelling.   Gastrointestinal:  Negative for abdominal distention, abdominal pain and vomiting.   Genitourinary:  Negative for difficulty urinating, dysuria, flank pain and frequency.   Musculoskeletal:  Negative for arthralgias, back pain, joint swelling and myalgias.   Skin:  Positive for color change and wound. Negative for pallor and rash.        Erythema of left breast with drainage from chronic wound.   Neurological:  Negative for dizziness, weakness, light-headedness and headaches.   Psychiatric/Behavioral:  Negative for agitation, behavioral problems, confusion and suicidal ideas.      Objective:     Vital Signs (Most Recent):  Temp: 97.7 °F (36.5 °C) (11/05/24 1204)  Pulse: 70 (11/05/24 1204)  Resp: 18 (11/05/24 1204)  BP: (!) 155/74 (11/05/24 1204)  SpO2: 100 % (11/05/24 1204) Vital Signs (24h Range):  Temp:  [97.5 °F (36.4 °C)-98.1 °F (36.7 °C)] 97.7 °F (36.5 °C)  Pulse:  [58-70] 70  Resp:  [16-18] 18  SpO2:  [97 %-100 %] 100 %  BP: (124-161)/(72-80) 155/74     Weight: 96.4 kg (212 lb 8.4 oz)  Body mass index is 32.31 kg/m².    Intake/Output Summary (Last 24 hours) at 11/5/2024 1316  Last data filed at 11/5/2024 0930  Gross per 24 hour   Intake 1460 ml   Output --   Net 1460 ml         Physical Exam  Vitals reviewed.   Constitutional:       General: She is not in acute distress.     Appearance: Normal appearance. She is normal weight. She is not ill-appearing.   HENT:      Head: Normocephalic and atraumatic.      Nose: Nose normal.      Mouth/Throat:      Mouth: Mucous membranes are  moist.      Pharynx: Oropharynx is clear.   Eyes:      General: No scleral icterus.     Extraocular Movements: Extraocular movements intact.      Conjunctiva/sclera: Conjunctivae normal.      Pupils: Pupils are equal, round, and reactive to light.   Cardiovascular:      Rate and Rhythm: Normal rate and regular rhythm.      Pulses: Normal pulses.      Heart sounds: Normal heart sounds. No murmur heard.     No gallop.   Pulmonary:      Effort: Pulmonary effort is normal. No respiratory distress.      Breath sounds: Normal breath sounds. No wheezing, rhonchi or rales.   Chest:      Chest wall: No tenderness.   Abdominal:      General: Abdomen is flat. There is no distension.      Palpations: Abdomen is soft.      Tenderness: There is no abdominal tenderness.   Musculoskeletal:         General: No swelling or tenderness.      Cervical back: Normal range of motion and neck supple. No rigidity or tenderness.      Right lower leg: No edema.      Left lower leg: No edema.   Skin:     General: Skin is warm and dry.      Comments: Swelling and significant cellulitis of left breast, with erythema extending to the clavicle, left mid-axillary region and substernal region. Nonhealing wound noted.   Neurological:      General: No focal deficit present.      Mental Status: She is alert and oriented to person, place, and time. Mental status is at baseline.      Motor: No weakness.   Psychiatric:         Mood and Affect: Mood normal.         Behavior: Behavior normal.         Thought Content: Thought content normal.             Significant Labs: All pertinent labs within the past 24 hours have been reviewed.    Significant Imaging: I have reviewed all pertinent imaging results/findings within the past 24 hours.

## 2024-11-06 VITALS
SYSTOLIC BLOOD PRESSURE: 145 MMHG | HEART RATE: 61 BPM | OXYGEN SATURATION: 99 % | HEIGHT: 68 IN | BODY MASS INDEX: 32.44 KG/M2 | TEMPERATURE: 98 F | RESPIRATION RATE: 18 BRPM | DIASTOLIC BLOOD PRESSURE: 72 MMHG | WEIGHT: 214.06 LBS

## 2024-11-06 LAB
ALBUMIN SERPL BCP-MCNC: 3.7 G/DL (ref 3.5–5.2)
ALP SERPL-CCNC: 85 U/L (ref 55–135)
ALT SERPL W/O P-5'-P-CCNC: 20 U/L (ref 10–44)
ANION GAP SERPL CALC-SCNC: 3 MMOL/L (ref 8–16)
AST SERPL-CCNC: 28 U/L (ref 10–40)
BASOPHILS # BLD AUTO: 0.02 K/UL (ref 0–0.2)
BASOPHILS NFR BLD: 0.5 % (ref 0–1.9)
BILIRUB SERPL-MCNC: 0.4 MG/DL (ref 0.1–1)
BUN SERPL-MCNC: 15 MG/DL (ref 8–23)
CALCIUM SERPL-MCNC: 8.7 MG/DL (ref 8.7–10.5)
CHLORIDE SERPL-SCNC: 108 MMOL/L (ref 95–110)
CO2 SERPL-SCNC: 29 MMOL/L (ref 23–29)
CREAT SERPL-MCNC: 0.7 MG/DL (ref 0.5–1.4)
DIFFERENTIAL METHOD BLD: ABNORMAL
EOSINOPHIL # BLD AUTO: 0.2 K/UL (ref 0–0.5)
EOSINOPHIL NFR BLD: 3.9 % (ref 0–8)
ERYTHROCYTE [DISTWIDTH] IN BLOOD BY AUTOMATED COUNT: 15.6 % (ref 11.5–14.5)
EST. GFR  (NO RACE VARIABLE): >60 ML/MIN/1.73 M^2
GLUCOSE SERPL-MCNC: 89 MG/DL (ref 70–110)
HCT VFR BLD AUTO: 38.1 % (ref 37–48.5)
HGB BLD-MCNC: 12.1 G/DL (ref 12–16)
IMM GRANULOCYTES # BLD AUTO: 0.02 K/UL (ref 0–0.04)
IMM GRANULOCYTES NFR BLD AUTO: 0.5 % (ref 0–0.5)
LYMPHOCYTES # BLD AUTO: 1.4 K/UL (ref 1–4.8)
LYMPHOCYTES NFR BLD: 32.9 % (ref 18–48)
MAGNESIUM SERPL-MCNC: 2.1 MG/DL (ref 1.6–2.6)
MCH RBC QN AUTO: 27.9 PG (ref 27–31)
MCHC RBC AUTO-ENTMCNC: 31.8 G/DL (ref 32–36)
MCV RBC AUTO: 88 FL (ref 82–98)
MONOCYTES # BLD AUTO: 0.4 K/UL (ref 0.3–1)
MONOCYTES NFR BLD: 8.2 % (ref 4–15)
NEUTROPHILS # BLD AUTO: 2.4 K/UL (ref 1.8–7.7)
NEUTROPHILS NFR BLD: 54 % (ref 38–73)
NRBC BLD-RTO: 0 /100 WBC
PLATELET # BLD AUTO: 177 K/UL (ref 150–450)
PMV BLD AUTO: 9.2 FL (ref 9.2–12.9)
POTASSIUM SERPL-SCNC: 4.1 MMOL/L (ref 3.5–5.1)
PROT SERPL-MCNC: 6.3 G/DL (ref 6–8.4)
RBC # BLD AUTO: 4.34 M/UL (ref 4–5.4)
SODIUM SERPL-SCNC: 140 MMOL/L (ref 136–145)
WBC # BLD AUTO: 4.38 K/UL (ref 3.9–12.7)

## 2024-11-06 PROCEDURE — 25000003 PHARM REV CODE 250: Performed by: INTERNAL MEDICINE

## 2024-11-06 PROCEDURE — 80053 COMPREHEN METABOLIC PANEL: CPT | Performed by: HOSPITALIST

## 2024-11-06 PROCEDURE — 36415 COLL VENOUS BLD VENIPUNCTURE: CPT | Performed by: INTERNAL MEDICINE

## 2024-11-06 PROCEDURE — 25000003 PHARM REV CODE 250: Performed by: HOSPITALIST

## 2024-11-06 PROCEDURE — 63700000 PHARM REV CODE 250 ALT 637 W/O HCPCS: Performed by: HOSPITALIST

## 2024-11-06 PROCEDURE — 63600175 PHARM REV CODE 636 W HCPCS: Performed by: HOSPITALIST

## 2024-11-06 PROCEDURE — 83735 ASSAY OF MAGNESIUM: CPT | Performed by: HOSPITALIST

## 2024-11-06 PROCEDURE — 85025 COMPLETE CBC W/AUTO DIFF WBC: CPT | Performed by: HOSPITALIST

## 2024-11-06 RX ORDER — CIPROFLOXACIN 500 MG/1
500 TABLET ORAL EVERY 12 HOURS
Qty: 16 TABLET | Refills: 0 | Status: SHIPPED | OUTPATIENT
Start: 2024-11-06 | End: 2024-11-14

## 2024-11-06 RX ORDER — LINEZOLID 600 MG/1
600 TABLET, FILM COATED ORAL EVERY 12 HOURS
Qty: 16 TABLET | Refills: 0 | Status: SHIPPED | OUTPATIENT
Start: 2024-11-06 | End: 2024-11-14

## 2024-11-06 RX ADMIN — LINEZOLID 600 MG: 600 TABLET, FILM COATED ORAL at 09:11

## 2024-11-06 RX ADMIN — FLUCONAZOLE 200 MG: 100 TABLET ORAL at 09:11

## 2024-11-06 RX ADMIN — PANTOPRAZOLE SODIUM 40 MG: 40 TABLET, DELAYED RELEASE ORAL at 05:11

## 2024-11-06 RX ADMIN — CEFTRIAXONE SODIUM 2 G: 2 INJECTION, POWDER, FOR SOLUTION INTRAMUSCULAR; INTRAVENOUS at 11:11

## 2024-11-06 RX ADMIN — LACTULOSE 20 G: 20 SOLUTION ORAL at 09:11

## 2024-11-06 RX ADMIN — VENLAFAXINE HYDROCHLORIDE 37.5 MG: 37.5 CAPSULE, EXTENDED RELEASE ORAL at 09:11

## 2024-11-06 NOTE — NURSING
Discharged instructions reviewed with pt. Verbalized understanding. Iv removed removed without difficulty, catheter intact. Discharged off unit via wheelchair in stable condition to personal vehicle.

## 2024-11-06 NOTE — NURSING
Per handoff received from Allison HENRIQUEZ LPN. Patient care assumed. Patients overall condition assessed and patient appears to be in NAD with complaints of headache pain to be treated. 20g RFA PIV is clean, dry, and intact. Call light in reach and patient instructed to inform the nurse if anything is needed. Patient stable and is continually being monitored.

## 2024-11-06 NOTE — PLAN OF CARE
Pt clear for DC from case management standpoint. Discharging to home with spouse at bedside to transport.  Appointments made and added to AVS.        11/06/24 1100   Final Note   Assessment Type Final Discharge Note   Anticipated Discharge Disposition Home   Hospital Resources/Appts/Education Provided Appointments scheduled and added to AVS

## 2024-11-06 NOTE — PLAN OF CARE
Problem: Adult Inpatient Plan of Care  Goal: Plan of Care Review  Outcome: Met  Goal: Patient-Specific Goal (Individualized)  Outcome: Met  Goal: Absence of Hospital-Acquired Illness or Injury  Outcome: Met  Goal: Optimal Comfort and Wellbeing  Outcome: Met  Goal: Readiness for Transition of Care  Outcome: Met     Problem: Wound  Goal: Optimal Coping  Outcome: Met  Goal: Optimal Functional Ability  Outcome: Met  Goal: Absence of Infection Signs and Symptoms  Outcome: Met  Goal: Improved Oral Intake  Outcome: Met  Goal: Optimal Pain Control and Function  Outcome: Met  Goal: Skin Health and Integrity  Outcome: Met  Goal: Optimal Wound Healing  Outcome: Met     Problem: Skin or Soft Tissue Infection  Goal: Absence of Infection Signs and Symptoms  Outcome: Met

## 2024-11-06 NOTE — DISCHARGE SUMMARY
[facility] Medicine  Discharge Summary      Patient Name: Paula Hernadez  MRN: [id number]  [facility]: [id number]  Patient Class: IP- Inpatient  Admission Date: 11/3/2024  Hospital Length of Stay: 3 days  Discharge Date and Time:  11/06/2024 10:46 AM  Attending Physician: Shanna Fernandez MD   Discharging Provider: Shanna Fernandez MD, MD  Primary Care Provider: Harpreet Gonsalez Jr., MD    Primary Care Team: Networked reference to record PCT     HPI:   64-year-old female with PMH of breast cancer status post lumpectomy in October of 2021, followed by radiation who presented to the ER bec of left breast cellulitis. Per pt, she underwent reconstruction surgery of her left breast in April of 2024, but the surgical site never healed due to radiation.  At that time, the plastic surgeon decided to proceed with a skin graft which was done in June of 2024, but this was met with a rejection.  A 2nd skin graft was done on July 20, 2024, but again 50% of the skin graft got rejected.  At that time, plastic surgeon recommended a 3rd skin graft, but patient refused and requested wound care instead.  She was doing fine with the wound care, wound was healing except for a hole underneath the left breast.  Yesterday after having a shower, she noted redness, and swelling of the left breast and it was warm to touch.  Today she woke up with worsening symptoms, so she decided to come to the ER.  She denied any fever or chills.    In the ER, vitals showed blood pressure of 120/58, heart rate of 74, respiratory rate of 18, afebrile satting 98% on room air.  CBC showed normocytic anemia with hemoglobin of 11.5.  CBC is within normal limits.  Procalcitonin is within normal limits.  UA is negative.  Blood cultures were collected.  Given the extent of the cellulitis in the swelling of her left breast, CT chest with IV contrast was done which showed Left breast soft tissue gas and edema, with no findings of soft tissue  abscess. Pt was given ceftriaxone and vanco. Pt will be admitted to medicine for further care.    * No surgery found *      Hospital Course:   Patient admitted and placed on IV abx. Wound care and ID consulted. ID made recommendations for iv abx, and planned to make recommendations for oral abx at discharge. Recommended oral cipro and zyvox at d/c, and patient was discharged home in stable condition. Will f/u outpatient with ID and wound care.     Goals of Care Treatment Preferences:  Code Status: Full Code      SDOH Screening:  The patient was screened for utility difficulties, food insecurity, transport difficulties, housing insecurity, and interpersonal safety and there were no concerns identified this admission.     Consults:   Consults (From admission, onward)          Status Ordering Provider     Inpatient consult to Infectious Diseases  Once        Provider:  Destin Álvarez MD    Completed TASHA PETERS            Renal/  * Cellulitis of left breast  Improved  D/C on oral abx  F/U outpatient with wound care        Other  Hot flashes  Continue Effexor; hold for now until abx course is complete        Final Active Diagnoses:    Diagnosis Date Noted POA    PRINCIPAL PROBLEM:  Cellulitis of left breast [N61.0] 11/03/2024 Yes    Onychomycosis [B35.1] 11/03/2024 Yes    Gastroesophageal reflux disease without esophagitis [K21.9] 11/03/2024 Yes    Hot flashes [R23.2] 10/22/2024 Yes      Problems Resolved During this Admission:       Discharged Condition: stable    Disposition: Home or Self Care    Follow Up:   Follow-up Information       Destin Álvarez MD Follow up.    Specialty: Infectious Diseases  Why: in 2 weeks.  Contact information:  [address] [facility] [address]  [phone]               Harpreet Gonsalez Jr., MD Follow up.    Specialty: Internal Medicine  Contact information:  [address] [facility] [address]  [phone]               Julio Gray,  Follow up on  11/13/2024.    Specialty: Wound Care  Why: @10am  Contact information:  [address]  [facility] [address]  [phone]                           Patient Instructions:      Diet Adult Regular     Activity as tolerated       Significant Diagnostic Studies: N/A    Pending Diagnostic Studies:       None           Medications:  Reconciled Home Medications:      Medication List        START taking these medications      ciprofloxacin HCl 500 MG tablet  Commonly known as: CIPRO  Take 1 tablet (500 mg total) by mouth every 12 (twelve) hours. for 8 days     linezolid 600 mg Tab  Commonly known as: ZYVOX  Take 1 tablet (600 mg total) by mouth every 12 (twelve) hours. for 8 days            CONTINUE taking these medications      acetaminophen 500 MG tablet  Commonly known as: TYLENOL  Take 1,000 mg by mouth every 6 (six) hours as needed for Pain.     anastrozole 1 mg Tab  Commonly known as: ARIMIDEX  Take 1 tablet (1 mg total) by mouth once daily.     fluconazole 200 MG Tab  Commonly known as: DIFLUCAN  Take 1 tablet (200 mg total) by mouth once a week. for 28 doses     omeprazole 20 MG capsule  Commonly known as: PRILOSEC  Take 20 mg by mouth once daily.     STOOL SOFTENER 100 mg capsule  Generic drug: docusate sodium  Take 100 mg by mouth 2 (two) times daily.     triamcinolone acetonide 0.1% 0.1 % cream  Commonly known as: KENALOG  Apply topically 2 (two) times daily.     venlafaxine 37.5 MG 24 hr capsule  Commonly known as: EFFEXOR-XR  Take 1 capsule (37.5 mg total) by mouth once daily.              Indwelling Lines/Drains at time of discharge:   Lines/Drains/Airways       None                   Time spent on the discharge of patient: 35 minutes         Shanna Fernandez MD, MD  Department of Hospital Medicine  [facility]

## 2024-11-06 NOTE — PLAN OF CARE
Problem: Adult Inpatient Plan of Care  Goal: Plan of Care Review  Flowsheets (Taken 11/6/2024 0233)  Plan of Care Reviewed With: patient  Goal: Absence of Hospital-Acquired Illness or Injury  Intervention: Identify and Manage Fall Risk  Flowsheets (Taken 11/6/2024 0233)  Safety Promotion/Fall Prevention: Fall Risk reviewed with patient/family  Intervention: Prevent Skin Injury  Flowsheets (Taken 11/6/2024 0233)  Body Position: position changed independently  Intervention: Prevent and Manage VTE (Venous Thromboembolism) Risk  Flowsheets (Taken 11/6/2024 0233)  VTE Prevention/Management: ROM (active) performed  Goal: Optimal Comfort and Wellbeing  Intervention: Monitor Pain and Promote Comfort  Flowsheets (Taken 11/6/2024 0233)  Pain Management Interventions: pain management plan reviewed with patient/caregiver  Intervention: Provide Person-Centered Care  Flowsheets (Taken 11/6/2024 0233)  Trust Relationship/Rapport:   care explained   choices provided   questions answered   questions encouraged   reassurance provided   thoughts/feelings acknowledged     Problem: Wound  Goal: Optimal Coping  Intervention: Support Patient and Family Response  Flowsheets (Taken 11/6/2024 0233)  Supportive Measures: verbalization of feelings encouraged  Goal: Optimal Functional Ability  Intervention: Optimize Functional Ability  Flowsheets (Taken 11/6/2024 0233)  Activity Assistance Provided: independent  Goal: Absence of Infection Signs and Symptoms  Intervention: Prevent or Manage Infection  Flowsheets (Taken 11/6/2024 0233)  Infection Management: (IV and oral antibiotic therapy continued)   aseptic technique maintained   cultures obtained and sent to lab   other (see comments)  Goal: Optimal Pain Control and Function  Intervention: Prevent or Manage Pain  Flowsheets (Taken 11/6/2024 0233)  Pain Management Interventions: pain management plan reviewed with patient/caregiver  Goal: Skin Health and Integrity  Intervention: Optimize Skin  Protection  Flowsheets (Taken 11/6/2024 0233)  Head of Bed (HOB) Positioning: HOB at 30-45 degrees     Problem: Skin or Soft Tissue Infection  Goal: Absence of Infection Signs and Symptoms  Intervention: Minimize and Manage Infection Progression  Flowsheets (Taken 11/6/2024 0233)  Infection Management: (IV and oral antibiotic therapy continued)   aseptic technique maintained   cultures obtained and sent to lab   other (see comments)  Intervention: Provide Meticulous Infection Site Care  Flowsheets (Taken 11/6/2024 0233)  Topical Inflammation Care: border marked

## 2024-11-07 LAB
BACTERIA SPEC AEROBE CULT: ABNORMAL
LABCORP MISC TEST CODE: 6031
LABCORP MISC TEST NAME: NORMAL
LABCORP MISCELLANEOUS TEST: NORMAL

## 2024-11-08 ENCOUNTER — TELEPHONE (OUTPATIENT)
Dept: INFECTIOUS DISEASES | Facility: HOSPITAL | Age: 64
End: 2024-11-08

## 2024-11-08 DIAGNOSIS — N61.0 CELLULITIS OF LEFT BREAST: Primary | ICD-10-CM

## 2024-11-08 LAB
BACTERIA BLD CULT: NORMAL
BACTERIA BLD CULT: NORMAL

## 2024-11-08 NOTE — TELEPHONE ENCOUNTER
Her cultures was finalized after she was discharged.  It grew Pseudomonas aeruginosa resistant to ciprofloxacin but intermediate to levofloxacin.  Patient had improved on vancomycin and ceftriaxone and was discharged on linezolid +ciprofloxacin.  She states she has continued to improve and has gone back to work.    I discussed with her risk of infection relapsing or not resolving.  Also discussed that the only antibiotic that will work for the Pseudomonas that grew we will have to be given IV.  For now, since the infection appears to be resolving we will monitor clinically.  She understands that she may need home IV antibiotics or back in the hospital if infection relapses.  Her current appointment is on 11/20/2024.  We will try to see her earlier sometime next week.

## 2024-11-12 ENCOUNTER — OFFICE VISIT (OUTPATIENT)
Dept: INFECTIOUS DISEASES | Facility: CLINIC | Age: 64
End: 2024-11-12
Payer: COMMERCIAL

## 2024-11-12 VITALS
OXYGEN SATURATION: 99 % | TEMPERATURE: 98 F | HEART RATE: 73 BPM | DIASTOLIC BLOOD PRESSURE: 86 MMHG | HEIGHT: 68 IN | BODY MASS INDEX: 32.43 KG/M2 | WEIGHT: 214 LBS | SYSTOLIC BLOOD PRESSURE: 138 MMHG

## 2024-11-12 DIAGNOSIS — N61.0 CELLULITIS OF LEFT BREAST: Primary | ICD-10-CM

## 2024-11-12 PROCEDURE — 1111F DSCHRG MED/CURRENT MED MERGE: CPT | Mod: CPTII,S$GLB,, | Performed by: INTERNAL MEDICINE

## 2024-11-12 PROCEDURE — 3079F DIAST BP 80-89 MM HG: CPT | Mod: CPTII,S$GLB,, | Performed by: INTERNAL MEDICINE

## 2024-11-12 PROCEDURE — 99214 OFFICE O/P EST MOD 30 MIN: CPT | Mod: S$GLB,,, | Performed by: INTERNAL MEDICINE

## 2024-11-12 PROCEDURE — 99999 PR PBB SHADOW E&M-EST. PATIENT-LVL III: CPT | Mod: PBBFAC,,, | Performed by: INTERNAL MEDICINE

## 2024-11-12 PROCEDURE — 3075F SYST BP GE 130 - 139MM HG: CPT | Mod: CPTII,S$GLB,, | Performed by: INTERNAL MEDICINE

## 2024-11-12 PROCEDURE — 3008F BODY MASS INDEX DOCD: CPT | Mod: CPTII,S$GLB,, | Performed by: INTERNAL MEDICINE

## 2024-11-13 ENCOUNTER — OFFICE VISIT (OUTPATIENT)
Dept: WOUND CARE | Facility: HOSPITAL | Age: 64
End: 2024-11-13
Attending: FAMILY MEDICINE
Payer: COMMERCIAL

## 2024-11-13 ENCOUNTER — INFUSION (OUTPATIENT)
Dept: INFUSION THERAPY | Facility: HOSPITAL | Age: 64
End: 2024-11-13
Attending: NURSE PRACTITIONER
Payer: COMMERCIAL

## 2024-11-13 VITALS
SYSTOLIC BLOOD PRESSURE: 149 MMHG | OXYGEN SATURATION: 97 % | WEIGHT: 210 LBS | HEART RATE: 66 BPM | BODY MASS INDEX: 31.93 KG/M2 | TEMPERATURE: 98 F | DIASTOLIC BLOOD PRESSURE: 65 MMHG

## 2024-11-13 VITALS
DIASTOLIC BLOOD PRESSURE: 69 MMHG | SYSTOLIC BLOOD PRESSURE: 135 MMHG | HEART RATE: 65 BPM | RESPIRATION RATE: 17 BRPM | TEMPERATURE: 98 F

## 2024-11-13 DIAGNOSIS — D50.9 IRON DEFICIENCY ANEMIA, UNSPECIFIED IRON DEFICIENCY ANEMIA TYPE: ICD-10-CM

## 2024-11-13 DIAGNOSIS — S21.002D OPEN WOUND OF LEFT BREAST, SUBSEQUENT ENCOUNTER: ICD-10-CM

## 2024-11-13 DIAGNOSIS — D05.12 DUCTAL CARCINOMA IN SITU (DCIS) OF LEFT BREAST: Primary | ICD-10-CM

## 2024-11-13 DIAGNOSIS — T81.31XD SURGICAL WOUND DEHISCENCE, SUBSEQUENT ENCOUNTER: Primary | ICD-10-CM

## 2024-11-13 DIAGNOSIS — T86.829 SKIN GRAFT DISORDER: ICD-10-CM

## 2024-11-13 PROCEDURE — 63600175 PHARM REV CODE 636 W HCPCS: Mod: JZ,JG | Performed by: NURSE PRACTITIONER

## 2024-11-13 PROCEDURE — 96365 THER/PROPH/DIAG IV INF INIT: CPT

## 2024-11-13 PROCEDURE — 11042 DBRDMT SUBQ TIS 1ST 20SQCM/<: CPT | Mod: PN | Performed by: FAMILY MEDICINE

## 2024-11-13 PROCEDURE — 11042 DBRDMT SUBQ TIS 1ST 20SQCM/<: CPT | Mod: ,,, | Performed by: FAMILY MEDICINE

## 2024-11-13 PROCEDURE — 25000003 PHARM REV CODE 250: Performed by: NURSE PRACTITIONER

## 2024-11-13 PROCEDURE — 99499 UNLISTED E&M SERVICE: CPT | Mod: ,,, | Performed by: FAMILY MEDICINE

## 2024-11-13 RX ORDER — SODIUM CHLORIDE 0.9 % (FLUSH) 0.9 %
10 SYRINGE (ML) INJECTION
Status: DISCONTINUED | OUTPATIENT
Start: 2024-11-13 | End: 2024-11-13 | Stop reason: HOSPADM

## 2024-11-13 RX ORDER — EPINEPHRINE 0.3 MG/.3ML
0.3 INJECTION SUBCUTANEOUS ONCE AS NEEDED
OUTPATIENT
Start: 2024-11-13

## 2024-11-13 RX ORDER — HEPARIN 100 UNIT/ML
5 SYRINGE INTRAVENOUS
Status: CANCELLED | OUTPATIENT
Start: 2024-11-13

## 2024-11-13 RX ORDER — HEPARIN 100 UNIT/ML
5 SYRINGE INTRAVENOUS
Status: DISCONTINUED | OUTPATIENT
Start: 2024-11-13 | End: 2024-11-13 | Stop reason: HOSPADM

## 2024-11-13 RX ORDER — SODIUM CHLORIDE 0.9 % (FLUSH) 0.9 %
10 SYRINGE (ML) INJECTION
Status: CANCELLED | OUTPATIENT
Start: 2024-11-13

## 2024-11-13 RX ORDER — DIPHENHYDRAMINE HYDROCHLORIDE 50 MG/ML
50 INJECTION INTRAMUSCULAR; INTRAVENOUS ONCE AS NEEDED
OUTPATIENT
Start: 2024-11-13

## 2024-11-13 RX ADMIN — FERRIC CARBOXYMALTOSE INJECTION 750 MG: 50 INJECTION, SOLUTION INTRAVENOUS at 08:11

## 2024-11-13 NOTE — PROGRESS NOTES
Wound Care Progress Note    Subjective:       Patient ID: Paula Hernadez is a 64 y.o. female.    Chief Complaint: No chief complaint on file.    HPI  Pt seen in clinic for a FU visit for a left breast surgical wound. Wound is improved, pt has no new complaints today  Review of Systems   Skin:  Positive for wound.        Open wound left breast   All other systems reviewed and are negative.      Objective:        Physical Exam  Vitals and nursing note reviewed.   Constitutional:       General: She is not in acute distress.     Appearance: Normal appearance.   Skin:     General: Skin is warm.      Findings: Lesion present.      Comments: Left breast surgical wound, see wound care assessment documentation in chart review scanned under the media tab   Neurological:      General: No focal deficit present.      Mental Status: She is alert.   Psychiatric:         Mood and Affect: Mood normal.         Judgment: Judgment normal.       There were no vitals filed for this visit.    Assessment:           ICD-10-CM ICD-9-CM   1. Surgical wound dehiscence, subsequent encounter  T81.31XD V58.89     998.32   2. Open wound of left breast, subsequent encounter  S21.002D V58.89     879.0   3. Skin graft disorder  T86.829 996.79                Plan:                  Diagnoses and all orders for this visit:    Surgical wound dehiscence, subsequent encounter    Open wound of left breast, subsequent encounter    Skin graft disorder      Much of the documentation for this visit was completed in the Wound Docs system.  Please see the attached documentation for further details about the patient's care. Scanned under the Media tab.

## 2024-11-13 NOTE — PLAN OF CARE
Problem: Adult Inpatient Plan of Care  Goal: Plan of Care Review  11/13/2024 0847 by Judi Vasquez RN  Outcome: Met  11/13/2024 0847 by Judi Vasquez RN  Outcome: Progressing  Goal: Patient-Specific Goal (Individualized)  11/13/2024 0847 by Judi Vasquez RN  Outcome: Met  11/13/2024 0847 by Judi Vasquez RN  Outcome: Progressing  Goal: Absence of Hospital-Acquired Illness or Injury  11/13/2024 0847 by Judi Vasquez RN  Outcome: Met  11/13/2024 0847 by Judi Vasquez RN  Outcome: Progressing  Goal: Optimal Comfort and Wellbeing  11/13/2024 0847 by Judi Vasquez RN  Outcome: Met  11/13/2024 0847 by Judi Vasquez RN  Outcome: Progressing  Goal: Readiness for Transition of Care  11/13/2024 0847 by Judi Vasquez RN  Outcome: Met  11/13/2024 0847 by Judi Vasquez RN  Outcome: Progressing

## 2024-11-14 NOTE — PROGRESS NOTES
Subjective:      Reason for consult:  Hospital follow up    HPI: Paula Hernadez is a 64 y.o. female with history of left partial mastectomy for management of breast cancer followed by radiation therapy.  She then had breast reconstruction in April 20, 2024.  The wound did not heal well due to poor tissue from previous radiation.  She had STSG in [place] bed 20, 2024 to cover the wound but it use the crossmatch was failed.  Then had a 2nd STSG in July 2024 with 50% take.  Has since followed up at Wound Care with Dr. Mckeon.     Noted redness of the breast yesterday which got worse.  Presented to the emergency room today and was admitted.  Vitals in the ED unremarkable.  She was admitted and placed on vancomycin and ceftriaxone.  ID asked to assist with her care.     Antibiotic history:    Vancomycin: 11/03/2024-11/05/2024  Ceftriaxone:  11/03/2024-11/05/2024   Linezolid: 11/05/2024-  Ciprofloxacin: 11/05/2024-     Microbiology:    Blood culture 11/03/2024: NGTD  Left breast wound drainage culture:  Few Pseudomonas aeruginosa resistant to cefepime and ciprofloxacin.  Intermittent to levofloxacin.    11/12/2024:  Left breast erythema has resolved.  Wound continues to slowly improve.  No other acute issues.    Review of patient's allergies indicates:   Allergen Reactions    Codeine Nausea Only     Past Medical History:   Diagnosis Date    Arthritis     Breast cancer     Chronic constipation     Chronic insomnia     Floaters in visual field     GERD (gastroesophageal reflux disease)     Seasonal allergies     Seborrheic keratoses     Thumb pain 2018     Past Surgical History:   Procedure Laterality Date    ABDOMINOPLASTY      BREAST BIOPSY      BREAST MASS EXCISION      COSMETIC SURGERY      tummy tuck    CYST REMOVAL  05/27/2021    chest    INTERPOSITION ARTHROPLASTY OF CARPOMETACARPAL JOINTS Left 06/09/2021    Procedure: INTERPOSITION ARTHROPLASTY, CMC JOINT;  Surgeon: Giovani Molina MD;  Location: [facility] OR;  Service:  Orthopedics;  Laterality: Left;    LASIK Bilateral     LIPOSUCTION W/ FAT INJECTION Bilateral 2024    Procedure: LIPOSUCTION, WITH FAT TRANSFER;  Surgeon: Yuniel Cadena MD;  Location: [facility];  Service: Plastics;  Laterality: Bilateral;    MASTECTOMY, PARTIAL Left 10/19/2021    Procedure: MASTECTOMY, PARTIAL;  Surgeon: Lila Perales MD;  Location: [facility] OR;  Service: General;  Laterality: Left;    MASTOPEXY Bilateral 2024    Procedure: MASTOPEXY;  Surgeon: Yuniel Cadena MD;  Location: [facility] OR;  Service: Plastics;  Laterality: Bilateral;    NEEDLE LOCALIZATION Left 10/19/2021    Procedure: NEEDLE LOCALIZATION;  Surgeon: Lila Perales MD;  Location: [facility];  Service: General;  Laterality: Left;  LOCALIZER DONE 10/15    SHOULDER SURGERY Left     bone spur    SKIN FULL THICKNESS GRAFT Bilateral 2024    Procedure: APPLICATION, GRAFT, SKIN, FULL-THICKNESS;  Surgeon: Yuniel Cadena MD;  Location: [facility];  Service: Plastics;  Laterality: Bilateral;    THUMB ARTHROSCOPY Left     TONSILLECTOMY        Social History     Tobacco Use    Smoking status: Former     Current packs/day: 0.00     Average packs/day: 0.3 packs/day for 27.8 years (6.9 ttl pk-yrs)     Types: Cigarettes     Start date:      Quit date: 10/6/2002     Years since quittin.1    Smokeless tobacco: Never   Substance Use Topics    Alcohol use: Yes     Alcohol/week: 4.0 standard drinks of alcohol     Types: 4 Standard drinks or equivalent per week     Comment: daiquiris, weekly     Family History   Problem Relation Name Age of Onset    Stroke Mother      Prostate cancer Father      Melanoma Neg Hx      Psoriasis Neg Hx      Lupus Neg Hx      Eczema Neg Hx         Pertinent medications noted:     Review of Systems  10 system review unremarkable.    Outdoor activities:  Lives with her .  Travel:  No recent travel  Implants:   Antibiotic History:  As in HPI      Objective:      Blood pressure 138/86, pulse 73, temperature  "97.7 °F (36.5 °C), temperature source Temporal, height 5' 8" (1.727 m), weight 97.1 kg (214 lb), last menstrual period 01/01/2012, SpO2 99%. Body mass index is 32.54 kg/m².  Physical Exam      General:  Middle-aged woman in no acute distress   Chest:  Resolved erythema right breast and sternal area.  Resolved erythema left breast with no induration.  Small ulcer inframammary area 1.2 x 0.7 x 3.2 cm deep  CVS: S1 and 2 heard, no murmurs appreciated   Respiratory: Clear to auscultation  Abdomen: Full, soft, nontender, palpable organomegaly   Skin:  No rash appreciated  Psychiatric: Normal mood, speech,  demeanor     Wound:  Left breast wound as above    VAD:  None      General Labs reviewed:  Lab Results   Component Value Date    WBC 4.38 11/06/2024    RBC 4.34 11/06/2024    HGB 12.1 11/06/2024    HCT 38.1 11/06/2024    MCV 88 11/06/2024    MCH 27.9 11/06/2024    MCHC 31.8 (L) 11/06/2024    RDW 15.6 (H) 11/06/2024     11/06/2024    MPV 9.2 11/06/2024    GRAN 2.4 11/06/2024    GRAN 54.0 11/06/2024    LYMPH 1.4 11/06/2024    LYMPH 32.9 11/06/2024    MONO 0.4 11/06/2024    MONO 8.2 11/06/2024    EOS 0.2 11/06/2024    BASO 0.02 11/06/2024    EOSINOPHIL 3.9 11/06/2024    BASOPHIL 0.5 11/06/2024     CMP  Sodium   Date Value Ref Range Status   11/06/2024 140 136 - 145 mmol/L Final     Potassium   Date Value Ref Range Status   11/06/2024 4.1 3.5 - 5.1 mmol/L Final     Chloride   Date Value Ref Range Status   11/06/2024 108 95 - 110 mmol/L Final     CO2   Date Value Ref Range Status   11/06/2024 29 23 - 29 mmol/L Final     Glucose   Date Value Ref Range Status   11/06/2024 89 70 - 110 mg/dL Final     BUN   Date Value Ref Range Status   11/06/2024 15 8 - 23 mg/dL Final     Creatinine   Date Value Ref Range Status   11/06/2024 0.7 0.5 - 1.4 mg/dL Final     Calcium   Date Value Ref Range Status   11/06/2024 8.7 8.7 - 10.5 mg/dL Final     Total Protein   Date Value Ref Range Status   11/06/2024 6.3 6.0 - 8.4 g/dL Final "     Albumin   Date Value Ref Range Status   11/06/2024 3.7 3.5 - 5.2 g/dL Final     Total Bilirubin   Date Value Ref Range Status   11/06/2024 0.4 0.1 - 1.0 mg/dL Final     Comment:     For infants and newborns, interpretation of results should be based  on gestational age, weight and in agreement with clinical  observations.    Premature Infant recommended reference ranges:  Up to 24 hours.............<8.0 mg/dL  Up to 48 hours............<12.0 mg/dL  3-5 days..................<15.0 mg/dL  6-29 days.................<15.0 mg/dL       Alkaline Phosphatase   Date Value Ref Range Status   11/06/2024 85 55 - 135 U/L Final     AST   Date Value Ref Range Status   11/06/2024 28 10 - 40 U/L Final     ALT   Date Value Ref Range Status   11/06/2024 20 10 - 44 U/L Final     Anion Gap   Date Value Ref Range Status   11/06/2024 3 (L) 8 - 16 mmol/L Final     eGFR   Date Value Ref Range Status   11/06/2024 >60.0 >60 mL/min/1.73 m^2 Final           Micro:  Microbiology Results (last 7 days)       ** No results found for the last 168 hours. **          Imaging Reviewed:          Assessment:     Bilateral breast and anterior chest wall cellulitis.  Left breast was more involved.  Cellulitis practically resolved.  She did grow Pseudomonas that was resistant to ciprofloxacin.  ASO titer was negative.  Significance of Pseudomonas on clear since patient has responded to initial vancomycin and ceftriaxone and now followed by linezolid and ciprofloxacin.  Continue to monitor clinically after completing antibiotics.  Wound care as per Dr. Gray.    I will plan to see again in 2 weeks  Problem List Items Addressed This Visit          Renal/    Cellulitis of left breast - Primary        Plan:     As above      Cellulitis of left breast        This note was created using Dragon voice recognition software that occasionally misinterpreted phrases or words.

## 2024-11-14 NOTE — PATIENT INSTRUCTIONS
We will continue to monitor the left breast when you complete antibiotics   Call my office if you have any new sign of infection

## 2024-11-20 ENCOUNTER — OFFICE VISIT (OUTPATIENT)
Dept: WOUND CARE | Facility: HOSPITAL | Age: 64
End: 2024-11-20
Attending: FAMILY MEDICINE
Payer: COMMERCIAL

## 2024-11-20 VITALS
RESPIRATION RATE: 17 BRPM | SYSTOLIC BLOOD PRESSURE: 134 MMHG | HEART RATE: 68 BPM | TEMPERATURE: 97 F | DIASTOLIC BLOOD PRESSURE: 69 MMHG

## 2024-11-20 DIAGNOSIS — T81.31XD SURGICAL WOUND DEHISCENCE, SUBSEQUENT ENCOUNTER: Primary | ICD-10-CM

## 2024-11-20 DIAGNOSIS — S21.002D OPEN WOUND OF LEFT BREAST, SUBSEQUENT ENCOUNTER: ICD-10-CM

## 2024-11-20 DIAGNOSIS — T86.829 SKIN GRAFT DISORDER: ICD-10-CM

## 2024-11-20 PROCEDURE — 1111F DSCHRG MED/CURRENT MED MERGE: CPT | Mod: CPTII,,, | Performed by: FAMILY MEDICINE

## 2024-11-20 PROCEDURE — 99213 OFFICE O/P EST LOW 20 MIN: CPT | Mod: ,,, | Performed by: FAMILY MEDICINE

## 2024-11-20 PROCEDURE — 3075F SYST BP GE 130 - 139MM HG: CPT | Mod: CPTII,,, | Performed by: FAMILY MEDICINE

## 2024-11-20 PROCEDURE — 99213 OFFICE O/P EST LOW 20 MIN: CPT | Mod: PN | Performed by: FAMILY MEDICINE

## 2024-11-20 PROCEDURE — 3078F DIAST BP <80 MM HG: CPT | Mod: CPTII,,, | Performed by: FAMILY MEDICINE

## 2024-11-20 PROCEDURE — 1159F MED LIST DOCD IN RCRD: CPT | Mod: CPTII,,, | Performed by: FAMILY MEDICINE

## 2024-11-20 PROCEDURE — 1160F RVW MEDS BY RX/DR IN RCRD: CPT | Mod: CPTII,,, | Performed by: FAMILY MEDICINE

## 2024-11-20 NOTE — PROGRESS NOTES
Wound Care Progress Note    Subjective:       Patient ID: Paula Hernadez is a 64 y.o. female.    Chief Complaint: Wound Care    HPI  Pt seen in clinic for a FU visit for a left breast open surgical wound. Wound is improving, pt has no new complaints today  Review of Systems   Skin:  Positive for wound.        Open wound left breast   All other systems reviewed and are negative.      Objective:        Physical Exam  Vitals and nursing note reviewed.   Constitutional:       General: She is not in acute distress.     Appearance: Normal appearance.   Skin:     General: Skin is warm and dry.      Findings: Lesion present.      Comments: Left breast open wound, see wound care assessment documentation in chart review scanned under the media tab   Neurological:      General: No focal deficit present.      Mental Status: She is alert.   Psychiatric:         Mood and Affect: Mood normal.         Judgment: Judgment normal.       Vitals:    11/20/24 0851   BP: 134/69   Pulse: 68   Resp: 17   Temp: 97 °F (36.1 °C)       Assessment:           ICD-10-CM ICD-9-CM   1. Surgical wound dehiscence, subsequent encounter  T81.31XD V58.89     998.32   2. Open wound of left breast, subsequent encounter  S21.002D V58.89     879.0   3. Skin graft disorder  T86.829 996.79                Plan:                  Paula was seen today for wound care.    Diagnoses and all orders for this visit:    Surgical wound dehiscence, subsequent encounter    Open wound of left breast, subsequent encounter    Skin graft disorder      Much of the documentation for this visit was completed in the Wound Docs system.  Please see the attached documentation for further details about the patient's care. Scanned under the Media tab.

## 2024-11-21 ENCOUNTER — PATIENT OUTREACH (OUTPATIENT)
Dept: ADMINISTRATIVE | Facility: CLINIC | Age: 64
End: 2024-11-21
Payer: COMMERCIAL

## 2024-11-21 NOTE — PROGRESS NOTES
C3 nurse attempted to contact Paula Hernadez for a TCC post hospital discharge follow up call. The patient is unable to conduct the call @ this time. The patient requested a callback.    The patient does not have a scheduled HOSFU appointment within 5-7 days post hospital discharge date 11/06/24. Message sent to Physician staff to assist with HOSFU appointment scheduling.

## 2024-11-22 NOTE — PROGRESS NOTES
C3 nurse spoke with Paula Hernadez for a TCC post hospital discharge follow up call. The patient has a scheduled HOSFU appointment with Harpreet Gonsalez Jr., MD on unknown @ unknown, pt provided appt details.

## 2024-12-04 ENCOUNTER — OFFICE VISIT (OUTPATIENT)
Dept: WOUND CARE | Facility: HOSPITAL | Age: 64
End: 2024-12-04
Attending: FAMILY MEDICINE
Payer: COMMERCIAL

## 2024-12-04 ENCOUNTER — OFFICE VISIT (OUTPATIENT)
Dept: INFECTIOUS DISEASES | Facility: CLINIC | Age: 64
End: 2024-12-04
Payer: COMMERCIAL

## 2024-12-04 VITALS
HEART RATE: 69 BPM | HEART RATE: 72 BPM | SYSTOLIC BLOOD PRESSURE: 121 MMHG | BODY MASS INDEX: 32.19 KG/M2 | TEMPERATURE: 99 F | TEMPERATURE: 98 F | RESPIRATION RATE: 17 BRPM | DIASTOLIC BLOOD PRESSURE: 66 MMHG | SYSTOLIC BLOOD PRESSURE: 118 MMHG | DIASTOLIC BLOOD PRESSURE: 67 MMHG | OXYGEN SATURATION: 96 % | HEIGHT: 68 IN | WEIGHT: 212.38 LBS

## 2024-12-04 DIAGNOSIS — N61.0 CELLULITIS OF LEFT BREAST: Primary | ICD-10-CM

## 2024-12-04 DIAGNOSIS — T81.31XD SURGICAL WOUND DEHISCENCE, SUBSEQUENT ENCOUNTER: Primary | ICD-10-CM

## 2024-12-04 DIAGNOSIS — S21.002D OPEN WOUND OF LEFT BREAST, SUBSEQUENT ENCOUNTER: ICD-10-CM

## 2024-12-04 DIAGNOSIS — T86.829 SKIN GRAFT DISORDER: ICD-10-CM

## 2024-12-04 PROCEDURE — 99499 UNLISTED E&M SERVICE: CPT | Mod: ,,, | Performed by: FAMILY MEDICINE

## 2024-12-04 PROCEDURE — 11042 DBRDMT SUBQ TIS 1ST 20SQCM/<: CPT | Mod: ,,, | Performed by: FAMILY MEDICINE

## 2024-12-04 PROCEDURE — 11042 DBRDMT SUBQ TIS 1ST 20SQCM/<: CPT | Mod: PN | Performed by: FAMILY MEDICINE

## 2024-12-04 PROCEDURE — 99999 PR PBB SHADOW E&M-EST. PATIENT-LVL III: CPT | Mod: PBBFAC,,, | Performed by: INTERNAL MEDICINE

## 2024-12-04 NOTE — PATIENT INSTRUCTIONS
ANAID from ID clinic  We can see you in the future if indicated  You are at risk for relapse of infection until the wound heals all the way  Seek help early if you notice any sign of infection

## 2024-12-04 NOTE — PROGRESS NOTES
Subjective:      Reason for consult:  Hospital follow up    HPI: Paula Hernadez is a 64 y.o. female with history of left partial mastectomy for management of breast cancer followed by radiation therapy.  She then had breast reconstruction in April 20, 2024.  The wound did not heal well due to poor tissue from previous radiation.  She had STSG in [place] bed 20, 2024 to cover the wound but it use the crossmatch was failed.  Then had a 2nd STSG in July 2024 with 50% take.  Has since followed up at [facility] with Dr. Mckeon.     Noted redness of the breast yesterday which got worse.  Presented to the emergency room today and was admitted.  Vitals in the ED unremarkable.  She was admitted and placed on vancomycin and ceftriaxone.  ID asked to assist with her care.     Antibiotic history:    Vancomycin: 11/03/2024-11/05/2024  Ceftriaxone:  11/03/2024-11/05/2024   Linezolid: 11/05/2024-  Ciprofloxacin: 11/05/2024-     Microbiology:    Blood culture 11/03/2024: NGTD  Left breast wound drainage culture:  Few Pseudomonas aeruginosa resistant to cefepime and ciprofloxacin.  Intermittent to levofloxacin.    11/12/2024:  Left breast erythema has resolved.  Wound continues to slowly improve.  No other acute issues.    12/4/24:  Wound continues to heal albeit slowly.  No new sign of infection.    Review of patient's allergies indicates:   Allergen Reactions    Codeine Nausea Only     Past Medical History:   Diagnosis Date    Arthritis     Breast cancer     Chronic constipation     Chronic insomnia     Floaters in visual field     GERD (gastroesophageal reflux disease)     Seasonal allergies     Seborrheic keratoses     Thumb pain 2018     Past Surgical History:   Procedure Laterality Date    ABDOMINOPLASTY      BREAST BIOPSY      BREAST MASS EXCISION      COSMETIC SURGERY      tummy tuck    CYST REMOVAL  05/27/2021    chest    INTERPOSITION ARTHROPLASTY OF CARPOMETACARPAL JOINTS Left 06/09/2021    Procedure: INTERPOSITION  ARTHROPLASTY, [facility] JOINT;  Surgeon: Giovani Molina MD;  Location: [facility];  Service: Orthopedics;  Laterality: Left;    LASIK Bilateral     LIPOSUCTION W/ FAT INJECTION Bilateral 2024    Procedure: LIPOSUCTION, WITH FAT TRANSFER;  Surgeon: Yuniel Cadena MD;  Location: [facility];  Service: Plastics;  Laterality: Bilateral;    MASTECTOMY, PARTIAL Left 10/19/2021    Procedure: MASTECTOMY, PARTIAL;  Surgeon: Lila Perales MD;  Location: [facility];  Service: General;  Laterality: Left;    MASTOPEXY Bilateral 2024    Procedure: MASTOPEXY;  Surgeon: Yuniel Cadena MD;  Location: [facility];  Service: Plastics;  Laterality: Bilateral;    NEEDLE LOCALIZATION Left 10/19/2021    Procedure: NEEDLE LOCALIZATION;  Surgeon: Lila Perales MD;  Location: [facility];  Service: General;  Laterality: Left;  LOCALIZER DONE 10/15    SHOULDER SURGERY Left     bone spur    SKIN FULL THICKNESS GRAFT Bilateral 2024    Procedure: APPLICATION, GRAFT, SKIN, FULL-THICKNESS;  Surgeon: Yuniel Cadena MD;  Location: [facility];  Service: Plastics;  Laterality: Bilateral;    THUMB ARTHROSCOPY Left     TONSILLECTOMY        Social History     Tobacco Use    Smoking status: Former     Current packs/day: 0.00     Average packs/day: 0.3 packs/day for 27.8 years (6.9 ttl pk-yrs)     Types: Cigarettes     Start date:      Quit date: 10/6/2002     Years since quittin.1    Smokeless tobacco: Never   Substance Use Topics    Alcohol use: Yes     Alcohol/week: 4.0 standard drinks of alcohol     Types: 4 Standard drinks or equivalent per week     Comment: daiquiris, weekly     Family History   Problem Relation Name Age of Onset    Stroke Mother      Prostate cancer Father      Melanoma Neg Hx      Psoriasis Neg Hx      Lupus Neg Hx      Eczema Neg Hx         Pertinent medications noted:     Review of Systems  10 system review unremarkable.    Outdoor activities:  Lives with her .  Travel:  No recent travel  Implants:   Antibiotic  "History:  As in HPI      Objective:      Blood pressure 118/66, pulse 69, temperature 98.5 °F (36.9 °C), temperature source Temporal, height 5' 8" (1.727 m), weight 96.3 kg (212 lb 6.4 oz), last menstrual period 01/01/2012, SpO2 96%. Body mass index is 32.3 kg/m².  Physical Exam      General:  Middle-aged woman in no acute distress   Chest: Resolved erythema left breast with no induration.  Stable lateral inframammary ulcer  CVS: S1 and 2 heard, no murmurs appreciated   Respiratory: Clear to auscultation  Abdomen: Full, soft, nontender, palpable organomegaly   Skin:  No rash appreciated  Psychiatric: Normal mood, speech,  demeanor     Wound:  Left breast wound as above    VAD:  None      General Labs reviewed:  Lab Results   Component Value Date    WBC 4.38 11/06/2024    RBC 4.34 11/06/2024    HGB 12.1 11/06/2024    HCT 38.1 11/06/2024    MCV 88 11/06/2024    MCH 27.9 11/06/2024    MCHC 31.8 (L) 11/06/2024    RDW 15.6 (H) 11/06/2024     11/06/2024    MPV 9.2 11/06/2024    GRAN 2.4 11/06/2024    GRAN 54.0 11/06/2024    LYMPH 1.4 11/06/2024    LYMPH 32.9 11/06/2024    MONO 0.4 11/06/2024    MONO 8.2 11/06/2024    EOS 0.2 11/06/2024    BASO 0.02 11/06/2024    EOSINOPHIL 3.9 11/06/2024    BASOPHIL 0.5 11/06/2024     CMP  Sodium   Date Value Ref Range Status   11/06/2024 140 136 - 145 mmol/L Final     Potassium   Date Value Ref Range Status   11/06/2024 4.1 3.5 - 5.1 mmol/L Final     Chloride   Date Value Ref Range Status   11/06/2024 108 95 - 110 mmol/L Final     CO2   Date Value Ref Range Status   11/06/2024 29 23 - 29 mmol/L Final     Glucose   Date Value Ref Range Status   11/06/2024 89 70 - 110 mg/dL Final     BUN   Date Value Ref Range Status   11/06/2024 15 8 - 23 mg/dL Final     Creatinine   Date Value Ref Range Status   11/06/2024 0.7 0.5 - 1.4 mg/dL Final     Calcium   Date Value Ref Range Status   11/06/2024 8.7 8.7 - 10.5 mg/dL Final     Total Protein   Date Value Ref Range Status   11/06/2024 6.3 6.0 " - 8.4 g/dL Final     Albumin   Date Value Ref Range Status   11/06/2024 3.7 3.5 - 5.2 g/dL Final     Total Bilirubin   Date Value Ref Range Status   11/06/2024 0.4 0.1 - 1.0 mg/dL Final     Comment:     For infants and newborns, interpretation of results should be based  on gestational age, weight and in agreement with clinical  observations.    Premature Infant recommended reference ranges:  Up to 24 hours.............<8.0 mg/dL  Up to 48 hours............<12.0 mg/dL  3-5 days..................<15.0 mg/dL  6-29 days.................<15.0 mg/dL       Alkaline Phosphatase   Date Value Ref Range Status   11/06/2024 85 55 - 135 U/L Final     AST   Date Value Ref Range Status   11/06/2024 28 10 - 40 U/L Final     ALT   Date Value Ref Range Status   11/06/2024 20 10 - 44 U/L Final     Anion Gap   Date Value Ref Range Status   11/06/2024 3 (L) 8 - 16 mmol/L Final     eGFR   Date Value Ref Range Status   11/06/2024 >60.0 >60 mL/min/1.73 m^2 Final           Micro:  Microbiology Results (last 7 days)       ** No results found for the last 168 hours. **          Imaging Reviewed:          Assessment:     Bilateral breast and anterior chest wall cellulitis.  Clinically resolved.  Discussed risk of relapse of infection especially since she still has a small open wound.  Wound care as per Dr. Gray.      Problem List Items Addressed This Visit    None         Plan:     Discharge from [facility] today.  Discussed risk of recurrence and need for expedited care if she develops new infection on cellulitis.  She will follow up with wound care and her PCP.      There are no diagnoses linked to this encounter.      This note was created using Dragon voice recognition software that occasionally misinterpreted phrases or words.

## 2024-12-04 NOTE — PROGRESS NOTES
Wound Care Progress Note    Subjective:       Patient ID: Paula Hernadez is a 64 y.o. female.    Chief Complaint: Wound Care    HPI  Pt seen in clinic for a FU visit for a skin graft breakdown. Wound continues to improve, no new complaints today  Review of Systems   Skin:  Positive for wound.        Left breast open wound   All other systems reviewed and are negative.      Objective:        Physical Exam  Vitals and nursing note reviewed.   Constitutional:       General: She is not in acute distress.     Appearance: Normal appearance.   Skin:     General: Skin is warm and dry.      Findings: Lesion present.      Comments: Left breast open wound, see wound care assessment documentation in chart review scanned under the media tab   Neurological:      General: No focal deficit present.      Mental Status: She is alert.   Psychiatric:         Mood and Affect: Mood normal.         Judgment: Judgment normal.       There were no vitals filed for this visit.    Assessment:           ICD-10-CM ICD-9-CM   1. Surgical wound dehiscence, subsequent encounter  T81.31XD V58.89     998.32   2. Open wound of left breast, subsequent encounter  S21.002D V58.89     879.0   3. Skin graft disorder  T86.829 996.79                Plan:                  Paula was seen today for wound care.    Diagnoses and all orders for this visit:    Surgical wound dehiscence, subsequent encounter    Open wound of left breast, subsequent encounter    Skin graft disorder        Much of the documentation for this visit was completed in the [facility] system.  Please see the attached documentation for further details about the patient's care. Scanned under the Media tab.

## 2024-12-11 ENCOUNTER — OFFICE VISIT (OUTPATIENT)
Dept: WOUND CARE | Facility: HOSPITAL | Age: 64
End: 2024-12-11
Attending: FAMILY MEDICINE
Payer: COMMERCIAL

## 2024-12-11 VITALS
SYSTOLIC BLOOD PRESSURE: 137 MMHG | HEART RATE: 77 BPM | TEMPERATURE: 98 F | RESPIRATION RATE: 18 BRPM | DIASTOLIC BLOOD PRESSURE: 76 MMHG

## 2024-12-11 DIAGNOSIS — T81.31XD SURGICAL WOUND DEHISCENCE, SUBSEQUENT ENCOUNTER: Primary | ICD-10-CM

## 2024-12-11 DIAGNOSIS — T86.829 SKIN GRAFT DISORDER: ICD-10-CM

## 2024-12-11 DIAGNOSIS — S21.002D OPEN WOUND OF LEFT BREAST, SUBSEQUENT ENCOUNTER: ICD-10-CM

## 2024-12-11 PROCEDURE — 99214 OFFICE O/P EST MOD 30 MIN: CPT | Mod: PN | Performed by: FAMILY MEDICINE

## 2024-12-11 PROCEDURE — 99213 OFFICE O/P EST LOW 20 MIN: CPT | Mod: PN | Performed by: FAMILY MEDICINE

## 2024-12-11 NOTE — PATIENT INSTRUCTIONS
Much of the documentation for this visit was completed in the [facility] system. Please see the attached documentation for further details about the patient's care. Scanned under Media tab.

## 2024-12-11 NOTE — PROGRESS NOTES
Wound Care Progress Note    Subjective:       Patient ID: Paula Hernadez is a 64 y.o. female.    Chief Complaint: No chief complaint on file.    HPI  Pt seen in clinic for a FU visit for a left breast open surgical wound. Wound is continuing to improve, no new complaints today  Review of Systems   Skin:  Positive for wound.        Open wound left breast   All other systems reviewed and are negative.      Objective:        Physical Exam  Vitals and nursing note reviewed.   Constitutional:       Appearance: Normal appearance.   Skin:     General: Skin is warm and dry.      Findings: Lesion present.      Comments: Left breast surgical wound, seer wound care assessment documentation in chart review scanned under the media tab   Neurological:      General: No focal deficit present.      Mental Status: She is alert.   Psychiatric:         Judgment: Judgment normal.       There were no vitals filed for this visit.    Assessment:           ICD-10-CM ICD-9-CM   1. Surgical wound dehiscence, subsequent encounter  T81.31XD V58.89     998.32   2. Open wound of left breast, subsequent encounter  S21.002D V58.89     879.0   3. Skin graft disorder  T86.829 996.79                Plan:                  Diagnoses and all orders for this visit:    Surgical wound dehiscence, subsequent encounter    Open wound of left breast, subsequent encounter    Skin graft disorder      See wound care instructions provided separately

## 2024-12-16 ENCOUNTER — PATIENT MESSAGE (OUTPATIENT)
Dept: WOUND CARE | Facility: HOSPITAL | Age: 64
End: 2024-12-16
Payer: COMMERCIAL

## 2024-12-18 ENCOUNTER — OFFICE VISIT (OUTPATIENT)
Dept: WOUND CARE | Facility: HOSPITAL | Age: 64
End: 2024-12-18
Attending: FAMILY MEDICINE
Payer: COMMERCIAL

## 2024-12-18 VITALS
DIASTOLIC BLOOD PRESSURE: 74 MMHG | RESPIRATION RATE: 18 BRPM | SYSTOLIC BLOOD PRESSURE: 132 MMHG | TEMPERATURE: 98 F | HEART RATE: 65 BPM

## 2024-12-18 DIAGNOSIS — T81.31XD SURGICAL WOUND DEHISCENCE, SUBSEQUENT ENCOUNTER: Primary | ICD-10-CM

## 2024-12-18 DIAGNOSIS — S21.002D OPEN WOUND OF LEFT BREAST, SUBSEQUENT ENCOUNTER: ICD-10-CM

## 2024-12-18 DIAGNOSIS — T86.829 SKIN GRAFT DISORDER: ICD-10-CM

## 2024-12-18 PROCEDURE — 99214 OFFICE O/P EST MOD 30 MIN: CPT | Mod: PN | Performed by: FAMILY MEDICINE

## 2024-12-18 NOTE — PROGRESS NOTES
Wound Care Progress Note    Subjective:       Patient ID: Paula Hernadez is a 64 y.o. female.    Chief Complaint: No chief complaint on file.    HPI  Pt seen in clinic for a FU visit for a left breast surgical wound. Wound continues to improve, pt had a reaxction to the dressing adhesive but improved when she removed it. No other complaints today  Review of Systems   Skin:  Positive for wound.        Open wound left breast   All other systems reviewed and are negative.      Objective:        Physical Exam  Vitals and nursing note reviewed. Exam conducted with a chaperone present.   Constitutional:       General: She is not in acute distress.     Appearance: Normal appearance.   Skin:     General: Skin is warm and dry.      Findings: Lesion present.      Comments: Left breast surgical wound, see wound care assessment documentation in chart review scanned under the media tab   Neurological:      General: No focal deficit present.      Mental Status: She is alert.   Psychiatric:         Mood and Affect: Mood normal.         Judgment: Judgment normal.       There were no vitals filed for this visit.    Assessment:           ICD-10-CM ICD-9-CM   1. Surgical wound dehiscence, subsequent encounter  T81.31XD V58.89     998.32   2. Open wound of left breast, subsequent encounter  S21.002D V58.89     879.0   3. Skin graft disorder  T86.829 996.79                Plan:                  Diagnoses and all orders for this visit:    Surgical wound dehiscence, subsequent encounter    Open wound of left breast, subsequent encounter    Skin graft disorder      See attached wound care documentation

## 2024-12-31 DIAGNOSIS — D05.12 DUCTAL CARCINOMA IN SITU (DCIS) OF LEFT BREAST: Primary | ICD-10-CM

## 2025-01-02 NOTE — PROGRESS NOTES
PROGRESS NOTE    Subjective:       Patient ID: Paula Hernadez is a 64 y.o. female.    9/29/2021  Dx Mammo:  microcalc in left breast at 4-5 o'clock area.     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     1/19/2022  Radiation complete    2/9/2022  Arimidex started.     1/19/2022:  Dexa: Normal    6/20/2022:  Bilateral mammogram negative  Recheck left breast in 6 months(surgical changes)    1/11/2023:  Left breast microcal biopsy:  BREAST, LEFT, MICROCALCIFICATIONS, STEREOTACTIC GUIDED CORE BIOPSY   - FAT NECROSIS AND FIBROSIS WITH ASSOCIATED MICROCALCIFICATIONS     Chief Complaint:  DCIS follow up    History of Present Illness:   Paula Hernadez is a 64 y.o. female who presents for follow up of above.      Ms. Hernadez is doing well today.  Hgb and Ferritin pending. She continue on Anastrazole and is tolerating it well. She does continue to have hot flashes which are stable.    Patient had reconstruction in April and has had 2 failed skin graft since and continues with wound care.    She remains on Arimidex as of today, 10/23/2024    Family and Social history reviewed and is unchanged from 11/17/2021      ROS:  Review of Systems   Constitutional:  Negative for malaise/fatigue.   Respiratory:  Negative for cough and shortness of breath.    Cardiovascular:  Negative for chest pain.   Gastrointestinal:  Negative for abdominal pain and diarrhea.   Genitourinary:  Negative for frequency.   Musculoskeletal:  Negative for back pain.   Skin:  Negative for rash.   Neurological:  Negative for headaches.   Psychiatric/Behavioral:  The patient is not nervous/anxious.          Current Outpatient Medications:     acetaminophen (TYLENOL) 500 MG tablet, Take 1,000 mg by mouth every 6 (six) hours as needed for Pain., Disp: , Rfl:     fluconazole (DIFLUCAN) 200 MG Tab, Take 1 tablet  (200 mg total) by mouth once a week. for 28 doses, Disp: 28 tablet, Rfl: 0    omeprazole (PRILOSEC) 20 MG capsule, Take 20 mg by mouth once daily. , Disp: , Rfl:     temazepam (RESTORIL) 15 mg Cap, Take 15 mg by mouth every evening., Disp: , Rfl:     venlafaxine (EFFEXOR-XR) 37.5 MG 24 hr capsule, Take 1 capsule (37.5 mg total) by mouth once daily., Disp: 90 capsule, Rfl: 3    ALPRAZolam (XANAX) 0.25 MG tablet, Take 1 tablet 1 hour prior to MRI may repeat if needed, Disp: 2 tablet, Rfl: 0    anastrozole (ARIMIDEX) 1 mg Tab, Take 1 tablet (1 mg total) by mouth once daily., Disp: 90 tablet, Rfl: 3        Objective:       Physical Examination:     BP (!) 142/69   Pulse 67   Temp 97.2 °F (36.2 °C)   Resp 17   Wt 97.2 kg (214 lb 3.2 oz)   LMP 01/01/2012 Comment: menopause  BMI 32.57 kg/m²     Physical Exam  Physical Exam  Constitutional:       Appearance: Normal appearance.   HENT:      Head: Normocephalic and atraumatic.      Right Ear: External ear normal.      Left Ear: External ear normal.      Nose: Nose normal.      Mouth/Throat:      Mouth: Mucous membranes are moist.   Eyes:      Pupils: Pupils are equal, round, and reactive to light.   Cardiovascular:      Rate and Rhythm: Normal rate and regular rhythm.   Pulmonary:      Breath sounds: Normal breath sounds.   Chest:   Breasts:     Right: Absent.      Left: Absent.       Abdominal:      Palpations: Abdomen is soft.   Musculoskeletal:      Right lower leg: No edema.      Left lower leg: No edema.   Lymphadenopathy:      Upper Body:      Right upper body: No supraclavicular, axillary or pectoral adenopathy.      Left upper body: No supraclavicular, axillary or pectoral adenopathy.   Skin:     General: Skin is warm and dry.   Neurological:      General: No focal deficit present.      Mental Status: She is alert and oriented to person, place, and time.   Psychiatric:         Mood and Affect: Mood normal.         Behavior: Behavior normal.         Thought  Content: Thought content normal.         Judgment: Judgment normal.                  Labs:   No results found for this or any previous visit (from the past 2 weeks).      CMP  Sodium   Date Value Ref Range Status   11/06/2024 140 136 - 145 mmol/L Final     Potassium   Date Value Ref Range Status   11/06/2024 4.1 3.5 - 5.1 mmol/L Final     Chloride   Date Value Ref Range Status   11/06/2024 108 95 - 110 mmol/L Final     CO2   Date Value Ref Range Status   11/06/2024 29 23 - 29 mmol/L Final     Glucose   Date Value Ref Range Status   11/06/2024 89 70 - 110 mg/dL Final     BUN   Date Value Ref Range Status   11/06/2024 15 8 - 23 mg/dL Final     Creatinine   Date Value Ref Range Status   11/06/2024 0.7 0.5 - 1.4 mg/dL Final     Calcium   Date Value Ref Range Status   11/06/2024 8.7 8.7 - 10.5 mg/dL Final     Total Protein   Date Value Ref Range Status   11/06/2024 6.3 6.0 - 8.4 g/dL Final     Albumin   Date Value Ref Range Status   11/06/2024 3.7 3.5 - 5.2 g/dL Final     Total Bilirubin   Date Value Ref Range Status   11/06/2024 0.4 0.1 - 1.0 mg/dL Final     Comment:     For infants and newborns, interpretation of results should be based  on gestational age, weight and in agreement with clinical  observations.    Premature Infant recommended reference ranges:  Up to 24 hours.............<8.0 mg/dL  Up to 48 hours............<12.0 mg/dL  3-5 days..................<15.0 mg/dL  6-29 days.................<15.0 mg/dL       Alkaline Phosphatase   Date Value Ref Range Status   11/06/2024 85 55 - 135 U/L Final     AST   Date Value Ref Range Status   11/06/2024 28 10 - 40 U/L Final     ALT   Date Value Ref Range Status   11/06/2024 20 10 - 44 U/L Final     Anion Gap   Date Value Ref Range Status   11/06/2024 3 (L) 8 - 16 mmol/L Final     eGFR if    Date Value Ref Range Status   06/15/2022 >60.0 >60 mL/min/1.73 m^2 Final     eGFR if non    Date Value Ref Range Status   06/15/2022 >60.0 >60  "mL/min/1.73 m^2 Final     Comment:     Calculation used to obtain the estimated glomerular filtration  rate (eGFR) is the CKD-EPI equation.        No results found for: "CEA"    Assessment/Plan:     Problem List Items Addressed This Visit          Psychiatric    Claustrophobia     Xanax prior to MRI Breast         Relevant Medications    ALPRAZolam (XANAX) 0.25 MG tablet       Renal/    Abnormal mammogram of left breast    Relevant Orders    MRI Breast w/wo Contrast, w/CAD, Bilateral       Oncology    DCIS-Left Breast-ER/WA pos - Primary     Patient due for right breast Mammogram. Not able to mammogram left breast due to recent surgeries. Will get MRI breast for evaluation after breast healed.         Relevant Medications    anastrozole (ARIMIDEX) 1 mg Tab    Other Relevant Orders    MRI Breast w/wo Contrast, w/CAD, Bilateral    Long term (current) use of aromatase inhibitors     Bone Density due May 2025         Relevant Medications    anastrozole (ARIMIDEX) 1 mg Tab    Anemia, unspecified     Labs today pending.         Relevant Orders    CBC W/ AUTO DIFFERENTIAL    CMP    Ferritin    Iron and TIBC       Other    Hot flashes     Hot flashes on Anastrozole stable. Will continue to monitor.            Discussion:     Follow up in about 6 months (around 7/3/2025) for with Dr. Patel.      Electronically signed by Aminah Esqueda, MSN, APRN, AGNP-C, OCN                    "

## 2025-01-03 ENCOUNTER — TELEPHONE (OUTPATIENT)
Dept: HEMATOLOGY/ONCOLOGY | Facility: CLINIC | Age: 65
End: 2025-01-03
Payer: COMMERCIAL

## 2025-01-03 ENCOUNTER — TELEPHONE (OUTPATIENT)
Facility: CLINIC | Age: 65
End: 2025-01-03
Payer: COMMERCIAL

## 2025-01-03 ENCOUNTER — OFFICE VISIT (OUTPATIENT)
Facility: CLINIC | Age: 65
End: 2025-01-03
Payer: COMMERCIAL

## 2025-01-03 ENCOUNTER — LAB VISIT (OUTPATIENT)
Dept: LAB | Facility: HOSPITAL | Age: 65
End: 2025-01-03
Attending: NURSE PRACTITIONER
Payer: MEDICARE

## 2025-01-03 VITALS
RESPIRATION RATE: 17 BRPM | BODY MASS INDEX: 32.57 KG/M2 | DIASTOLIC BLOOD PRESSURE: 69 MMHG | HEART RATE: 67 BPM | SYSTOLIC BLOOD PRESSURE: 142 MMHG | WEIGHT: 214.19 LBS | TEMPERATURE: 97 F

## 2025-01-03 DIAGNOSIS — D64.9 ANEMIA, UNSPECIFIED TYPE: Primary | ICD-10-CM

## 2025-01-03 DIAGNOSIS — R23.2 HOT FLASHES: ICD-10-CM

## 2025-01-03 DIAGNOSIS — R92.8 ABNORMAL MAMMOGRAM OF LEFT BREAST: ICD-10-CM

## 2025-01-03 DIAGNOSIS — D05.12 DUCTAL CARCINOMA IN SITU (DCIS) OF LEFT BREAST: Primary | ICD-10-CM

## 2025-01-03 DIAGNOSIS — D50.8 IRON DEFICIENCY ANEMIA SECONDARY TO INADEQUATE DIETARY IRON INTAKE: ICD-10-CM

## 2025-01-03 DIAGNOSIS — D64.9 ANEMIA, UNSPECIFIED TYPE: ICD-10-CM

## 2025-01-03 DIAGNOSIS — Z79.811 LONG TERM (CURRENT) USE OF AROMATASE INHIBITORS: ICD-10-CM

## 2025-01-03 DIAGNOSIS — F40.240 CLAUSTROPHOBIA: ICD-10-CM

## 2025-01-03 LAB
BASOPHILS # BLD AUTO: 0.02 K/UL (ref 0–0.2)
BASOPHILS NFR BLD: 0.4 % (ref 0–1.9)
DIFFERENTIAL METHOD BLD: ABNORMAL
EOSINOPHIL # BLD AUTO: 0.2 K/UL (ref 0–0.5)
EOSINOPHIL NFR BLD: 3.8 % (ref 0–8)
ERYTHROCYTE [DISTWIDTH] IN BLOOD BY AUTOMATED COUNT: 17.6 % (ref 11.5–14.5)
FERRITIN SERPL-MCNC: 375.2 NG/ML (ref 20–300)
HCT VFR BLD AUTO: 44.9 % (ref 37–48.5)
HGB BLD-MCNC: 14.2 G/DL (ref 12–16)
IMM GRANULOCYTES # BLD AUTO: 0.01 K/UL (ref 0–0.04)
IMM GRANULOCYTES NFR BLD AUTO: 0.2 % (ref 0–0.5)
LYMPHOCYTES # BLD AUTO: 1.6 K/UL (ref 1–4.8)
LYMPHOCYTES NFR BLD: 35 % (ref 18–48)
MCH RBC QN AUTO: 29.4 PG (ref 27–31)
MCHC RBC AUTO-ENTMCNC: 31.6 G/DL (ref 32–36)
MCV RBC AUTO: 93 FL (ref 82–98)
MONOCYTES # BLD AUTO: 0.4 K/UL (ref 0.3–1)
MONOCYTES NFR BLD: 7.5 % (ref 4–15)
NEUTROPHILS # BLD AUTO: 2.5 K/UL (ref 1.8–7.7)
NEUTROPHILS NFR BLD: 53.1 % (ref 38–73)
NRBC BLD-RTO: 0 /100 WBC
PLATELET # BLD AUTO: 184 K/UL (ref 150–450)
PMV BLD AUTO: 10 FL (ref 9.2–12.9)
RBC # BLD AUTO: 4.83 M/UL (ref 4–5.4)
WBC # BLD AUTO: 4.69 K/UL (ref 3.9–12.7)

## 2025-01-03 PROCEDURE — 36415 COLL VENOUS BLD VENIPUNCTURE: CPT | Performed by: NURSE PRACTITIONER

## 2025-01-03 PROCEDURE — 99999 PR PBB SHADOW E&M-EST. PATIENT-LVL III: CPT | Mod: PBBFAC,,, | Performed by: NURSE PRACTITIONER

## 2025-01-03 PROCEDURE — 85025 COMPLETE CBC W/AUTO DIFF WBC: CPT | Performed by: NURSE PRACTITIONER

## 2025-01-03 PROCEDURE — 82728 ASSAY OF FERRITIN: CPT | Performed by: NURSE PRACTITIONER

## 2025-01-03 RX ORDER — ANASTROZOLE 1 MG/1
1 TABLET ORAL DAILY
Qty: 90 TABLET | Refills: 3 | Status: SHIPPED | OUTPATIENT
Start: 2025-01-03

## 2025-01-03 RX ORDER — TEMAZEPAM 15 MG/1
15 CAPSULE ORAL NIGHTLY
COMMUNITY
Start: 2024-12-11

## 2025-01-03 RX ORDER — ALPRAZOLAM 0.25 MG/1
TABLET ORAL
Qty: 2 TABLET | Refills: 0 | Status: SHIPPED | OUTPATIENT
Start: 2025-01-03

## 2025-01-03 NOTE — TELEPHONE ENCOUNTER
I spoke to this patient and let her know labs are better and to repeat same labs in 6 months prior to the next appt here. She voiced understanding      ----- Message from SAGAR Luna sent at 1/3/2025  2:27 PM CST -----  Please notify the patient that their ferritin and hgb are better. Repeat labs as discussed in 6 mths prior to MD visit.

## 2025-01-03 NOTE — ASSESSMENT & PLAN NOTE
Patient due for right breast Mammogram. Not able to mammogram left breast due to recent surgeries. Will get MRI breast for evaluation after breast healed.

## 2025-01-03 NOTE — PROGRESS NOTES
Please notify the patient that their ferritin and hgb are better. Repeat labs as discussed in 6 mths prior to MD visit.

## 2025-01-08 ENCOUNTER — OFFICE VISIT (OUTPATIENT)
Dept: WOUND CARE | Facility: HOSPITAL | Age: 65
End: 2025-01-08
Attending: FAMILY MEDICINE
Payer: COMMERCIAL

## 2025-01-08 VITALS
RESPIRATION RATE: 18 BRPM | TEMPERATURE: 97 F | DIASTOLIC BLOOD PRESSURE: 85 MMHG | HEART RATE: 73 BPM | SYSTOLIC BLOOD PRESSURE: 137 MMHG

## 2025-01-08 DIAGNOSIS — S21.002D OPEN WOUND OF LEFT BREAST, SUBSEQUENT ENCOUNTER: ICD-10-CM

## 2025-01-08 DIAGNOSIS — T86.829 SKIN GRAFT DISORDER: ICD-10-CM

## 2025-01-08 DIAGNOSIS — T81.31XD SURGICAL WOUND DEHISCENCE, SUBSEQUENT ENCOUNTER: Primary | ICD-10-CM

## 2025-01-08 PROCEDURE — 99213 OFFICE O/P EST LOW 20 MIN: CPT | Mod: ,,, | Performed by: FAMILY MEDICINE

## 2025-01-08 PROCEDURE — 99214 OFFICE O/P EST MOD 30 MIN: CPT | Mod: PN | Performed by: FAMILY MEDICINE

## 2025-01-08 NOTE — PROGRESS NOTES
Wound Care Progress Note    Subjective:       Patient ID: Paula Hernadez is a 64 y.o. female.    Chief Complaint: No chief complaint on file.    HPI  Pt seen in clinic for a FU visit for a left breast open wound surgical. Wound continues to improve. No new complaints today  Review of Systems   Skin:  Positive for wound.        Open wound left breast   All other systems reviewed and are negative.      Objective:        Physical Exam  Vitals and nursing note reviewed.   Constitutional:       General: She is not in acute distress.     Appearance: Normal appearance.   Skin:     General: Skin is warm and dry.      Findings: Lesion present.      Comments: Left breast open surgical wound, see wound care assessment documentation in chart review scanned under the media tab   Neurological:      General: No focal deficit present.      Mental Status: She is alert.   Psychiatric:         Mood and Affect: Mood normal.         Judgment: Judgment normal.       There were no vitals filed for this visit.    Assessment:           ICD-10-CM ICD-9-CM   1. Surgical wound dehiscence, subsequent encounter  T81.31XD V58.89     998.32   2. Open wound of left breast, subsequent encounter  S21.002D V58.89     879.0   3. Skin graft disorder  T86.829 996.79                Plan:                  Diagnoses and all orders for this visit:    Surgical wound dehiscence, subsequent encounter    Open wound of left breast, subsequent encounter    Skin graft disorder        See wound care instructions provided separately

## 2025-01-14 ENCOUNTER — HOSPITAL ENCOUNTER (OUTPATIENT)
Dept: RADIOLOGY | Facility: HOSPITAL | Age: 65
Discharge: HOME | End: 2025-01-14
Attending: NURSE PRACTITIONER
Payer: MEDICARE

## 2025-01-14 DIAGNOSIS — D05.12 DUCTAL CARCINOMA IN SITU (DCIS) OF LEFT BREAST: ICD-10-CM

## 2025-01-14 PROCEDURE — 77061 BREAST TOMOSYNTHESIS UNI: CPT | Mod: 26,RT,, | Performed by: RADIOLOGY

## 2025-01-14 PROCEDURE — 77065 DX MAMMO INCL CAD UNI: CPT | Mod: 26,RT,, | Performed by: RADIOLOGY

## 2025-01-14 PROCEDURE — 77061 BREAST TOMOSYNTHESIS UNI: CPT | Mod: TC,PO,RT

## 2025-01-14 NOTE — PROGRESS NOTES
Please call and notify patient right breast mammogram was negative. Continue current treatment plan and follow up as scheduled.

## 2025-01-15 ENCOUNTER — TELEPHONE (OUTPATIENT)
Facility: CLINIC | Age: 65
End: 2025-01-15
Payer: MEDICARE

## 2025-01-15 NOTE — TELEPHONE ENCOUNTER
----- Message from SAGAR Luna sent at 1/14/2025  4:37 PM CST -----  Please call and notify patient right breast mammogram was negative. Continue current treatment plan and follow up as scheduled.

## 2025-01-15 NOTE — TELEPHONE ENCOUNTER
Called patient with above information, per patient she does not want to proceed with bilateral MRI of the breast and will repeat a Mammogram when breast heals. Provider made aware. Patient stated understanding.

## 2025-01-29 ENCOUNTER — OFFICE VISIT (OUTPATIENT)
Dept: WOUND CARE | Facility: HOSPITAL | Age: 65
End: 2025-01-29
Attending: FAMILY MEDICINE
Payer: MEDICARE

## 2025-01-29 VITALS
TEMPERATURE: 98 F | RESPIRATION RATE: 17 BRPM | DIASTOLIC BLOOD PRESSURE: 82 MMHG | HEART RATE: 67 BPM | SYSTOLIC BLOOD PRESSURE: 177 MMHG

## 2025-01-29 DIAGNOSIS — L08.9 WOUND INFECTION: ICD-10-CM

## 2025-01-29 DIAGNOSIS — T14.8XXA WOUND INFECTION: ICD-10-CM

## 2025-01-29 DIAGNOSIS — S21.002D OPEN WOUND OF LEFT BREAST, SUBSEQUENT ENCOUNTER: Primary | ICD-10-CM

## 2025-01-29 PROCEDURE — 99213 OFFICE O/P EST LOW 20 MIN: CPT | Mod: PN | Performed by: FAMILY MEDICINE

## 2025-01-29 RX ORDER — LEVOFLOXACIN 500 MG/1
500 TABLET, FILM COATED ORAL DAILY
Qty: 10 TABLET | Refills: 0 | Status: SHIPPED | OUTPATIENT
Start: 2025-01-29 | End: 2025-02-08

## 2025-01-29 NOTE — PROGRESS NOTES
Wound Care Progress Note    Subjective:       Patient ID: Paula Hernadez is a 65 y.o. female.    Chief Complaint: Wound Care    HPI  Pt seen in clinic for a FU visit for a left breast open surgical wound. Wound continues to improve, breast is increased redness. No other complaints today  Review of Systems   Skin:  Positive for wound.        Open wound left breast   All other systems reviewed and are negative.      Objective:        Physical Exam  Vitals and nursing note reviewed.   Constitutional:       General: She is not in acute distress.     Appearance: Normal appearance.   Skin:     General: Skin is warm and dry.      Findings: Lesion present.      Comments: Left breast open wound, see wound care assessment documentation in chart review scanned under the media tab   Neurological:      General: No focal deficit present.      Mental Status: She is alert.   Psychiatric:         Mood and Affect: Mood normal.         Judgment: Judgment normal.       Vitals:    01/29/25 1348   BP: (!) 177/82   Pulse: 67   Resp: 17   Temp: 97.9 °F (36.6 °C)       Assessment:           ICD-10-CM ICD-9-CM   1. Open wound of left breast, subsequent encounter  S21.002D V58.89     879.0   2. Wound infection  T14.8XXA 958.3    L08.9                 Plan:                  Paula was seen today for wound care.    Diagnoses and all orders for this visit:    Open wound of left breast, subsequent encounter    Wound infection    Other orders  -     levoFLOXacin (LEVAQUIN) 500 MG tablet; Take 1 tablet (500 mg total) by mouth once daily. for 10 days            Much of the documentation for this visit was completed in the [facility] system.  Please see the attached documentation for further details about the patient's care. Scanned under the Media tab.

## 2025-02-12 ENCOUNTER — OFFICE VISIT (OUTPATIENT)
Dept: WOUND CARE | Facility: HOSPITAL | Age: 65
End: 2025-02-12
Attending: FAMILY MEDICINE
Payer: MEDICARE

## 2025-02-12 VITALS
RESPIRATION RATE: 18 BRPM | HEART RATE: 77 BPM | TEMPERATURE: 98 F | DIASTOLIC BLOOD PRESSURE: 83 MMHG | SYSTOLIC BLOOD PRESSURE: 146 MMHG

## 2025-02-12 DIAGNOSIS — S21.002D OPEN WOUND OF LEFT BREAST, SUBSEQUENT ENCOUNTER: Primary | ICD-10-CM

## 2025-02-12 DIAGNOSIS — T81.31XD SURGICAL WOUND DEHISCENCE, SUBSEQUENT ENCOUNTER: ICD-10-CM

## 2025-02-12 PROCEDURE — 99213 OFFICE O/P EST LOW 20 MIN: CPT | Mod: PN | Performed by: FAMILY MEDICINE

## 2025-02-12 NOTE — PROGRESS NOTES
Wound Care Progress Note    Subjective:       Patient ID: Paula Hernadez is a 65 y.o. female.    Chief Complaint: No chief complaint on file.    HPI  Pt seen in clinic for a FU visit for a left breast surgical wound. Wound is healed. No new complaints today  Review of Systems   Skin:  Positive for wound.        Healed left breast wound   All other systems reviewed and are negative.      Objective:        Physical Exam  Vitals and nursing note reviewed.   Constitutional:       General: She is not in acute distress.     Appearance: Normal appearance.   Skin:     General: Skin is warm and dry.      Findings: Lesion present.      Comments: Healed left breast wound, see wound care assessment documentation in chart review scanned under the media tab   Neurological:      General: No focal deficit present.      Mental Status: She is alert.   Psychiatric:         Mood and Affect: Mood normal.         Judgment: Judgment normal.       There were no vitals filed for this visit.    Assessment:           ICD-10-CM ICD-9-CM   1. Open wound of left breast, subsequent encounter  S21.002D V58.89     879.0   2. Surgical wound dehiscence, subsequent encounter  T81.31XD V58.89     998.32                Plan:                  Diagnoses and all orders for this visit:    Open wound of left breast, subsequent encounter    Surgical wound dehiscence, subsequent encounter        See attached wound care documentation.

## 2025-02-27 ENCOUNTER — OFFICE VISIT (OUTPATIENT)
Dept: WOUND CARE | Facility: HOSPITAL | Age: 65
End: 2025-02-27
Attending: FAMILY MEDICINE
Payer: MEDICARE

## 2025-02-27 VITALS
HEART RATE: 67 BPM | RESPIRATION RATE: 17 BRPM | TEMPERATURE: 98 F | DIASTOLIC BLOOD PRESSURE: 78 MMHG | SYSTOLIC BLOOD PRESSURE: 135 MMHG

## 2025-02-27 DIAGNOSIS — T81.31XD SURGICAL WOUND DEHISCENCE, SUBSEQUENT ENCOUNTER: Primary | ICD-10-CM

## 2025-02-27 DIAGNOSIS — S21.002D OPEN WOUND OF LEFT BREAST, SUBSEQUENT ENCOUNTER: ICD-10-CM

## 2025-02-27 DIAGNOSIS — T86.829 SKIN GRAFT DISORDER: ICD-10-CM

## 2025-02-27 PROCEDURE — 87186 SC STD MICRODIL/AGAR DIL: CPT | Performed by: FAMILY MEDICINE

## 2025-02-27 PROCEDURE — 87075 CULTR BACTERIA EXCEPT BLOOD: CPT | Performed by: FAMILY MEDICINE

## 2025-02-27 PROCEDURE — 87070 CULTURE OTHR SPECIMN AEROBIC: CPT | Performed by: FAMILY MEDICINE

## 2025-02-27 PROCEDURE — 99213 OFFICE O/P EST LOW 20 MIN: CPT | Mod: PN | Performed by: FAMILY MEDICINE

## 2025-02-27 NOTE — PROGRESS NOTES
Wound Care Progress Note    Subjective:       Patient ID: Paula Hernadez is a 65 y.o. female.    Chief Complaint: No chief complaint on file.    HPI  Pt seen in clinic for a left breast dehisced surgical wound of the left breast. Pt was discharged healed, but the original wound broke down again. No other complaints today  Review of Systems   Skin:  Positive for wound.        Open wound left breast   All other systems reviewed and are negative.      Objective:        Physical Exam  Vitals and nursing note reviewed. Exam conducted with a chaperone present.   Constitutional:       Appearance: Normal appearance. She is not toxic-appearing.   Skin:     General: Skin is warm and dry.      Findings: Lesion present.      Comments: Left breast surgical wound, see wound care assessment documentation in chart review scanned under the media tab   Neurological:      General: No focal deficit present.      Mental Status: She is alert.   Psychiatric:         Mood and Affect: Mood normal.         Judgment: Judgment normal.       There were no vitals filed for this visit.    Assessment:           ICD-10-CM ICD-9-CM   1. Surgical wound dehiscence, subsequent encounter  T81.31XD V58.89     998.32   2. Open wound of left breast, subsequent encounter  S21.002D V58.89     879.0   3. Skin graft disorder  T86.829 996.79                Plan:                  Diagnoses and all orders for this visit:    Surgical wound dehiscence, subsequent encounter    Open wound of left breast, subsequent encounter    Skin graft disorder        See wound care instructions provided separately

## 2025-03-01 LAB
BACTERIA SPEC AEROBE CULT: ABNORMAL
BACTERIA SPEC ANAEROBE CULT: NORMAL

## 2025-03-06 ENCOUNTER — RESULTS FOLLOW-UP (OUTPATIENT)
Dept: WOUND CARE | Facility: HOSPITAL | Age: 65
End: 2025-03-06

## 2025-03-06 ENCOUNTER — OFFICE VISIT (OUTPATIENT)
Dept: WOUND CARE | Facility: HOSPITAL | Age: 65
End: 2025-03-06
Attending: FAMILY MEDICINE
Payer: MEDICARE

## 2025-03-06 VITALS
RESPIRATION RATE: 18 BRPM | HEART RATE: 69 BPM | TEMPERATURE: 97 F | DIASTOLIC BLOOD PRESSURE: 90 MMHG | SYSTOLIC BLOOD PRESSURE: 167 MMHG

## 2025-03-06 DIAGNOSIS — T86.829 SKIN GRAFT DISORDER: ICD-10-CM

## 2025-03-06 DIAGNOSIS — T81.31XD SURGICAL WOUND DEHISCENCE, SUBSEQUENT ENCOUNTER: ICD-10-CM

## 2025-03-06 DIAGNOSIS — L08.9 WOUND INFECTION: Primary | ICD-10-CM

## 2025-03-06 DIAGNOSIS — T14.8XXA WOUND INFECTION: Primary | ICD-10-CM

## 2025-03-06 DIAGNOSIS — S21.002D OPEN WOUND OF LEFT BREAST, SUBSEQUENT ENCOUNTER: ICD-10-CM

## 2025-03-06 PROCEDURE — 99214 OFFICE O/P EST MOD 30 MIN: CPT | Mod: ,,, | Performed by: FAMILY MEDICINE

## 2025-03-06 PROCEDURE — 99212 OFFICE O/P EST SF 10 MIN: CPT | Mod: PN | Performed by: FAMILY MEDICINE

## 2025-03-06 RX ORDER — SULFAMETHOXAZOLE AND TRIMETHOPRIM 800; 160 MG/1; MG/1
1 TABLET ORAL 2 TIMES DAILY
Qty: 20 TABLET | Refills: 0 | Status: SHIPPED | OUTPATIENT
Start: 2025-03-06 | End: 2025-03-16

## 2025-03-06 NOTE — PROGRESS NOTES
Wound Care Progress Note    Subjective:       Patient ID: Paula Hernadez is a 65 y.o. female.    Chief Complaint: No chief complaint on file.    HPI  Pt seen in clinic for a FU visit for left breast surgical wound. Wound is stable, has + culture back. No other complaints today  Review of Systems   Skin:  Positive for wound.        Open wound left breast   All other systems reviewed and are negative.      Objective:        Physical Exam  Vitals and nursing note reviewed. Exam conducted with a chaperone present.   Constitutional:       General: She is not in acute distress.     Appearance: Normal appearance.   Skin:     General: Skin is warm and dry.      Findings: Lesion present.      Comments: Left breast surgical wound, see wound care assessment documentation in chart review scanned under the media tab   Neurological:      General: No focal deficit present.      Mental Status: She is alert.   Psychiatric:         Mood and Affect: Mood normal.         Judgment: Judgment normal.       There were no vitals filed for this visit.    Assessment:           ICD-10-CM ICD-9-CM   1. Wound infection  T14.8XXA 958.3    L08.9    2. Surgical wound dehiscence, subsequent encounter  T81.31XD V58.89     998.32   3. Open wound of left breast, subsequent encounter  S21.002D V58.89     879.0   4. Skin graft disorder  T86.829 996.79                Plan:                  Diagnoses and all orders for this visit:    Wound infection    Surgical wound dehiscence, subsequent encounter    Open wound of left breast, subsequent encounter    Skin graft disorder    Other orders  -     sulfamethoxazole-trimethoprim 800-160mg (BACTRIM DS) 800-160 mg Tab; Take 1 tablet by mouth 2 (two) times daily. for 10 days        See wound care instructions provided separately

## 2025-03-12 ENCOUNTER — OFFICE VISIT (OUTPATIENT)
Dept: DERMATOLOGY | Facility: CLINIC | Age: 65
End: 2025-03-12
Payer: MEDICARE

## 2025-03-12 DIAGNOSIS — D22.9 MULTIPLE BENIGN NEVI: ICD-10-CM

## 2025-03-12 DIAGNOSIS — L82.1 SEBORRHEIC KERATOSIS: ICD-10-CM

## 2025-03-12 DIAGNOSIS — L81.4 LENTIGO: ICD-10-CM

## 2025-03-12 DIAGNOSIS — L57.8 ACTINIC SKIN DAMAGE: Primary | ICD-10-CM

## 2025-03-12 DIAGNOSIS — D18.01 CHERRY ANGIOMA: ICD-10-CM

## 2025-03-12 PROCEDURE — 99213 OFFICE O/P EST LOW 20 MIN: CPT | Mod: S$GLB,,, | Performed by: STUDENT IN AN ORGANIZED HEALTH CARE EDUCATION/TRAINING PROGRAM

## 2025-03-12 NOTE — PROGRESS NOTES
Subjective:      Patient ID:  Paula Hernadez is a 65 y.o. female who presents for   Chief Complaint   Patient presents with    Spot     Left cheek      LOV 8/15/23    Patient here today for skin check TBSE  Complains of spot on left check, not bothersome.     Denies Phx NMSC  Fhx: sister had skin cancer         Review of Systems   Constitutional:  Negative for fever and chills.   Respiratory:  Negative for cough and shortness of breath.    Gastrointestinal:  Negative for nausea and vomiting.   Skin:  Positive for activity-related sunscreen use. Negative for daily sunscreen use.   Hematologic/Lymphatic: Bruises/bleeds easily.       Objective:   Physical Exam   Constitutional: She appears well-developed and well-nourished. No distress.   Neurological: She is alert and oriented to person, place, and time. She is not disoriented.   Psychiatric: She has a normal mood and affect.   Skin:   Areas Examined (abnormalities noted in diagram):   Scalp / Hair Palpated and Inspected  Head / Face Inspection Performed  Neck Inspection Performed  Chest / Axilla Inspection Performed  Abdomen Inspection Performed  Genitals / Buttocks / Groin Inspection Performed  Back Inspection Performed  RUE Inspected  LUE Inspection Performed  RLE Inspected  LLE Inspection Performed  Nails and Digits Inspection Performed                     Diagram Legend     Erythematous scaling macule/papule c/w actinic keratosis       Vascular papule c/w angioma      Pigmented verrucoid papule/plaque c/w seborrheic keratosis      Yellow umbilicated papule c/w sebaceous hyperplasia      Irregularly shaped tan macule c/w lentigo     1-2 mm smooth white papules consistent with Milia      Movable subcutaneous cyst with punctum c/w epidermal inclusion cyst      Subcutaneous movable cyst c/w pilar cyst      Firm pink to brown papule c/w dermatofibroma      Pedunculated fleshy papule(s) c/w skin tag(s)      Evenly pigmented macule c/w junctional nevus     Mildly  variegated pigmented, slightly irregular-bordered macule c/w mildly atypical nevus      Flesh colored to evenly pigmented papule c/w intradermal nevus       Pink pearly papule/plaque c/w basal cell carcinoma      Erythematous hyperkeratotic cursted plaque c/w SCC      Surgical scar with no sign of skin cancer recurrence      Open and closed comedones      Inflammatory papules and pustules      Verrucoid papule consistent consistent with wart     Erythematous eczematous patches and plaques     Dystrophic onycholytic nail with subungual debris c/w onychomycosis     Umbilicated papule    Erythematous-base heme-crusted tan verrucoid plaque consistent with inflamed seborrheic keratosis     Erythematous Silvery Scaling Plaque c/w Psoriasis     See annotation      Assessment / Plan:        Actinic skin damage  Total body skin examination performed today including at least 12 points as noted in physical examination. No lesions suspicious for malignancy noted.  Patient instructed in importance in daily broad spectrum sun protection of at least spf 30. Mineral sunscreen ingredients preferred (Zinc +/- Titanium) and can be found OTC.   Patient encouraged to wear hat for all outdoor exposure.   Also discussed sun avoidance and use of protective clothing.    Seborrheic keratosis  These are benign inherited growths without a malignant potential. Reassurance given to patient. No treatment is necessary.     Multiple benign nevi  Careful dermoscopy evaluation of nevi performed with none identified as needing biopsy today  Monitor for new mole or moles that are becoming bigger, darker, irritated, or developing irregular borders.     Cherry angioma  This is a benign vascular lesion. Reassurance given. No treatment required.     Lentigo  This is a benign hyperpigmented sun induced lesion. Daily sun protection will reduce the number of new lesions. Treatment of these benign lesions are considered cosmetic.             No follow-ups on  file.

## 2025-03-13 ENCOUNTER — OFFICE VISIT (OUTPATIENT)
Dept: WOUND CARE | Facility: HOSPITAL | Age: 65
End: 2025-03-13
Attending: FAMILY MEDICINE
Payer: MEDICARE

## 2025-03-13 VITALS
TEMPERATURE: 97 F | DIASTOLIC BLOOD PRESSURE: 97 MMHG | SYSTOLIC BLOOD PRESSURE: 143 MMHG | RESPIRATION RATE: 18 BRPM | HEART RATE: 65 BPM

## 2025-03-13 DIAGNOSIS — T14.8XXA WOUND INFECTION: ICD-10-CM

## 2025-03-13 DIAGNOSIS — S21.002D OPEN WOUND OF LEFT BREAST, SUBSEQUENT ENCOUNTER: ICD-10-CM

## 2025-03-13 DIAGNOSIS — T81.31XD SURGICAL WOUND DEHISCENCE, SUBSEQUENT ENCOUNTER: Primary | ICD-10-CM

## 2025-03-13 DIAGNOSIS — L08.9 WOUND INFECTION: ICD-10-CM

## 2025-03-13 PROCEDURE — 99213 OFFICE O/P EST LOW 20 MIN: CPT | Mod: PN | Performed by: FAMILY MEDICINE

## 2025-03-13 PROCEDURE — 99213 OFFICE O/P EST LOW 20 MIN: CPT | Mod: ,,, | Performed by: FAMILY MEDICINE

## 2025-03-13 NOTE — PROGRESS NOTES
Wound Care Progress Note    Subjective:       Patient ID: Paula Hernadez is a 65 y.o. female.    Chief Complaint: Wound Care    HPI  Pt seen in clinic for a FU visit for a left breast dehisced surgical wound. Wound is slowly improving again, no new complaints today  Review of Systems   Skin:  Positive for wound.        Open wound left breast   All other systems reviewed and are negative.      Objective:        Physical Exam  Vitals and nursing note reviewed.   Constitutional:       General: She is not in acute distress.     Appearance: Normal appearance.   Skin:     General: Skin is warm and dry.      Findings: Lesion present.      Comments: Left breast open surgical wound, see wound care assessment documentation in chart review scanned under the media tab   Neurological:      General: No focal deficit present.      Mental Status: She is alert.   Psychiatric:         Mood and Affect: Mood normal.         Judgment: Judgment normal.       Vitals:    03/13/25 0756   BP: (!) 143/97   Pulse: 65   Resp: 18   Temp: 96.9 °F (36.1 °C)       Assessment:           ICD-10-CM ICD-9-CM   1. Surgical wound dehiscence, subsequent encounter  T81.31XD V58.89     998.32   2. Wound infection  T14.8XXA 958.3    L08.9    3. Open wound of left breast, subsequent encounter  S21.002D V58.89     879.0                Plan:                  Paula was seen today for wound care.    Diagnoses and all orders for this visit:    Surgical wound dehiscence, subsequent encounter    Wound infection    Open wound of left breast, subsequent encounter      Please see wound care instructions which have been provided separately.

## 2025-03-20 ENCOUNTER — HOSPITAL ENCOUNTER (OUTPATIENT)
Dept: RADIOLOGY | Facility: HOSPITAL | Age: 65
Discharge: HOME | End: 2025-03-20
Attending: NURSE PRACTITIONER
Payer: MEDICARE

## 2025-03-20 DIAGNOSIS — R92.8 ABNORMAL MAMMOGRAM OF LEFT BREAST: ICD-10-CM

## 2025-03-20 DIAGNOSIS — D05.12 DUCTAL CARCINOMA IN SITU (DCIS) OF LEFT BREAST: ICD-10-CM

## 2025-03-20 LAB
CREAT SERPL-MCNC: 0.8 MG/DL (ref 0.5–1.4)
SAMPLE: NORMAL

## 2025-03-20 PROCEDURE — A9585 GADOBUTROL INJECTION: HCPCS | Mod: PO | Performed by: NURSE PRACTITIONER

## 2025-03-20 PROCEDURE — C8937 CAD BREAST MRI: HCPCS | Mod: TC,PO

## 2025-03-20 PROCEDURE — 25500020 PHARM REV CODE 255: Mod: PO | Performed by: NURSE PRACTITIONER

## 2025-03-20 PROCEDURE — 77049 MRI BREAST C-+ W/CAD BI: CPT | Mod: 26,,, | Performed by: RADIOLOGY

## 2025-03-20 RX ORDER — GADOBUTROL 604.72 MG/ML
9.5 INJECTION INTRAVENOUS
Status: COMPLETED | OUTPATIENT
Start: 2025-03-20 | End: 2025-03-20

## 2025-03-20 RX ADMIN — GADOBUTROL 9.5 ML: 604.72 INJECTION INTRAVENOUS at 09:03

## 2025-03-27 ENCOUNTER — OFFICE VISIT (OUTPATIENT)
Dept: WOUND CARE | Facility: HOSPITAL | Age: 65
End: 2025-03-27
Attending: FAMILY MEDICINE
Payer: MEDICARE

## 2025-03-27 VITALS
DIASTOLIC BLOOD PRESSURE: 71 MMHG | RESPIRATION RATE: 20 BRPM | HEART RATE: 72 BPM | TEMPERATURE: 98 F | SYSTOLIC BLOOD PRESSURE: 129 MMHG

## 2025-03-27 DIAGNOSIS — T81.31XD SURGICAL WOUND DEHISCENCE, SUBSEQUENT ENCOUNTER: Primary | ICD-10-CM

## 2025-03-27 DIAGNOSIS — T86.829 SKIN GRAFT DISORDER: ICD-10-CM

## 2025-03-27 DIAGNOSIS — S21.002D OPEN WOUND OF LEFT BREAST, SUBSEQUENT ENCOUNTER: ICD-10-CM

## 2025-03-27 PROCEDURE — 99213 OFFICE O/P EST LOW 20 MIN: CPT | Mod: ,,, | Performed by: FAMILY MEDICINE

## 2025-03-27 PROCEDURE — 99213 OFFICE O/P EST LOW 20 MIN: CPT | Mod: PN | Performed by: FAMILY MEDICINE

## 2025-03-27 NOTE — PROGRESS NOTES
Wound Care Progress Note    Subjective:       Patient ID: Paula Hernadez is a 65 y.o. female.    Chief Complaint: No chief complaint on file.    HPI  Pt seen in clinic for a FU visit for a left breast dehisced surgical wound. Wound is stable, some improvement, no new complaints today  Review of Systems   Skin:  Positive for wound.        Left breast open wound   All other systems reviewed and are negative.      Objective:        Physical Exam  Vitals and nursing note reviewed. Exam conducted with a chaperone present.   Constitutional:       General: She is not in acute distress.     Appearance: Normal appearance.   Skin:     General: Skin is warm and dry.      Findings: Lesion present.      Comments: Left breast surgical wound, see wound care assessment documentation in chart review scanned under the media tab   Neurological:      General: No focal deficit present.      Mental Status: She is alert.   Psychiatric:         Judgment: Judgment normal.       There were no vitals filed for this visit.    Assessment:           ICD-10-CM ICD-9-CM   1. Surgical wound dehiscence, subsequent encounter  T81.31XD V58.89     998.32   2. Open wound of left breast, subsequent encounter  S21.002D V58.89     879.0   3. Skin graft disorder  T86.829 996.79                Plan:                  Diagnoses and all orders for this visit:    Surgical wound dehiscence, subsequent encounter    Open wound of left breast, subsequent encounter    Skin graft disorder      Much of the documentation for this visit was completed in the [facility] system. Please see the attached documentation for further details about the patient's care. Scanned under Media tab.

## 2025-04-07 DIAGNOSIS — G25.2 OTHER SPECIFIED FORMS OF TREMOR: Primary | ICD-10-CM

## 2025-04-10 ENCOUNTER — OFFICE VISIT (OUTPATIENT)
Dept: WOUND CARE | Facility: HOSPITAL | Age: 65
End: 2025-04-10
Attending: FAMILY MEDICINE
Payer: MEDICARE

## 2025-04-10 VITALS — TEMPERATURE: 98 F

## 2025-04-10 DIAGNOSIS — T81.31XD SURGICAL WOUND DEHISCENCE, SUBSEQUENT ENCOUNTER: Primary | ICD-10-CM

## 2025-04-10 DIAGNOSIS — S21.002D OPEN WOUND OF LEFT BREAST, SUBSEQUENT ENCOUNTER: ICD-10-CM

## 2025-04-10 DIAGNOSIS — T86.829 SKIN GRAFT DISORDER: ICD-10-CM

## 2025-04-10 PROCEDURE — 99213 OFFICE O/P EST LOW 20 MIN: CPT | Mod: PN | Performed by: FAMILY MEDICINE

## 2025-04-14 ENCOUNTER — HOSPITAL ENCOUNTER (OUTPATIENT)
Dept: RADIOLOGY | Facility: HOSPITAL | Age: 65
Discharge: HOME | End: 2025-04-14
Attending: INTERNAL MEDICINE
Payer: MEDICARE

## 2025-04-14 DIAGNOSIS — G25.2 OTHER SPECIFIED FORMS OF TREMOR: ICD-10-CM

## 2025-04-17 ENCOUNTER — HOSPITAL ENCOUNTER (OUTPATIENT)
Dept: RADIOLOGY | Facility: HOSPITAL | Age: 65
Discharge: HOME | End: 2025-04-17
Attending: INTERNAL MEDICINE
Payer: MEDICARE

## 2025-04-17 PROCEDURE — A9585 GADOBUTROL INJECTION: HCPCS | Mod: PO | Performed by: INTERNAL MEDICINE

## 2025-04-17 PROCEDURE — 70553 MRI BRAIN STEM W/O & W/DYE: CPT | Mod: 26,,, | Performed by: RADIOLOGY

## 2025-04-17 PROCEDURE — 70553 MRI BRAIN STEM W/O & W/DYE: CPT | Mod: TC,PO

## 2025-04-17 PROCEDURE — 25500020 PHARM REV CODE 255: Mod: PO | Performed by: INTERNAL MEDICINE

## 2025-04-17 RX ORDER — GADOBUTROL 604.72 MG/ML
9.5 INJECTION INTRAVENOUS
Status: COMPLETED | OUTPATIENT
Start: 2025-04-17 | End: 2025-04-17

## 2025-04-17 RX ADMIN — GADOBUTROL 9.5 ML: 604.72 INJECTION INTRAVENOUS at 03:04

## 2025-05-01 ENCOUNTER — OFFICE VISIT (OUTPATIENT)
Dept: WOUND CARE | Facility: HOSPITAL | Age: 65
End: 2025-05-01
Attending: FAMILY MEDICINE
Payer: MEDICARE

## 2025-05-01 VITALS
HEART RATE: 70 BPM | SYSTOLIC BLOOD PRESSURE: 166 MMHG | RESPIRATION RATE: 18 BRPM | TEMPERATURE: 98 F | DIASTOLIC BLOOD PRESSURE: 78 MMHG

## 2025-05-01 DIAGNOSIS — S21.002D OPEN WOUND OF LEFT BREAST, SUBSEQUENT ENCOUNTER: ICD-10-CM

## 2025-05-01 DIAGNOSIS — T81.31XD SURGICAL WOUND DEHISCENCE, SUBSEQUENT ENCOUNTER: Primary | ICD-10-CM

## 2025-05-01 DIAGNOSIS — T86.829 SKIN GRAFT DISORDER: ICD-10-CM

## 2025-05-01 PROCEDURE — 99213 OFFICE O/P EST LOW 20 MIN: CPT | Mod: PN | Performed by: FAMILY MEDICINE

## 2025-05-01 NOTE — PROGRESS NOTES
Wound Care Progress Note    Subjective:       Patient ID: Paula Hernadez is a 65 y.o. female.    Chief Complaint: No chief complaint on file.    HPI  Pt seen in clinic for a FU visit for a left breast open wound. Wound is smaller. Pt has no new complaints today  Review of Systems   Skin:  Positive for wound.        Left breast open wound   All other systems reviewed and are negative.      Objective:        Physical Exam  Vitals and nursing note reviewed. Exam conducted with a chaperone present.   Constitutional:       General: She is not in acute distress.     Appearance: Normal appearance.   Skin:     General: Skin is warm and dry.      Findings: Lesion present.      Comments: Left breast open wound, see wound care assessment documentation in chart review scanned under the media tab   Neurological:      General: No focal deficit present.      Mental Status: She is alert.   Psychiatric:         Mood and Affect: Mood normal.         Judgment: Judgment normal.       There were no vitals filed for this visit.    Assessment:           ICD-10-CM ICD-9-CM   1. Surgical wound dehiscence, subsequent encounter  T81.31XD V58.89     998.32   2. Skin graft disorder  T86.829 996.79   3. Open wound of left breast, subsequent encounter  S21.002D V58.89     879.0                Plan:                  Diagnoses and all orders for this visit:    Surgical wound dehiscence, subsequent encounter    Skin graft disorder    Open wound of left breast, subsequent encounter      See wound care instructions provided separately             normal sinus rhythm

## 2025-05-22 ENCOUNTER — OFFICE VISIT (OUTPATIENT)
Dept: WOUND CARE | Facility: HOSPITAL | Age: 65
End: 2025-05-22
Attending: FAMILY MEDICINE
Payer: MEDICARE

## 2025-05-22 VITALS — TEMPERATURE: 98 F

## 2025-05-22 DIAGNOSIS — T81.31XD SURGICAL WOUND DEHISCENCE, SUBSEQUENT ENCOUNTER: Primary | ICD-10-CM

## 2025-05-22 DIAGNOSIS — T86.829 SKIN GRAFT DISORDER: ICD-10-CM

## 2025-05-22 DIAGNOSIS — S21.002D OPEN WOUND OF LEFT BREAST, SUBSEQUENT ENCOUNTER: ICD-10-CM

## 2025-05-22 PROCEDURE — 99212 OFFICE O/P EST SF 10 MIN: CPT | Mod: PN | Performed by: FAMILY MEDICINE

## 2025-05-22 PROCEDURE — 99499 UNLISTED E&M SERVICE: CPT | Mod: ,,, | Performed by: FAMILY MEDICINE

## 2025-06-14 NOTE — PROGRESS NOTES
"                                                         PROGRESS NOTE    Subjective:       Patient ID: Paula Hernadez is a 61 y.o. female.    9/29/2021  Dx Mammo:  microcalc in left breast at 4-5 o'clock area.     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     1/19/2022  Will complete radiation therapy    2/1/2022  To begin Arimidex    Chief Complaint:  No chief complaint on file.  DCIS follow up    History of Present Illness:   Paula Hernadez is a 61 y.o. female who presents for follow up of above.      She is on radiation therapy.  Has started to have a rash on chest.        Family and Social history reviewed and is unchanged from 11/17/2021      ROS:  Review of Systems   Constitutional: Negative for fever.   Respiratory: Negative for shortness of breath.    Cardiovascular: Negative for chest pain and leg swelling.   Gastrointestinal: Negative for abdominal pain and blood in stool.   Genitourinary: Negative for hematuria.   Skin: Negative for rash.          Current Outpatient Medications:     anastrozole (ARIMIDEX) 1 mg Tab, Take 1 tablet (1 mg total) by mouth once daily., Disp: 90 tablet, Rfl: 3    ibuprofen (ADVIL,MOTRIN) 200 MG tablet, Take 200 mg by mouth every 6 (six) hours as needed. , Disp: , Rfl:     LINZESS 290 mcg Cap capsule, Take 290 mcg by mouth once daily., Disp: , Rfl:     omeprazole (PRILOSEC) 20 MG capsule, Take 20 mg by mouth once daily. , Disp: , Rfl:         Objective:       Physical Examination:     /69   Pulse 86   Temp 97.7 °F (36.5 °C)   Resp 18   Ht 5' 8" (1.727 m)   Wt 96.2 kg (212 lb)   LMP 01/01/2012 Comment: menopause  BMI 32.23 kg/m²     Physical Exam  Constitutional:       Appearance: She is well-developed and well-nourished.   HENT:      Head: Normocephalic and atraumatic.      Right Ear: External ear normal.      Left Ear: External ear normal.      Mouth/Throat:    "   Mouth: Oropharynx is clear and moist.   Eyes:      Conjunctiva/sclera: Conjunctivae normal.      Pupils: Pupils are equal, round, and reactive to light.   Neck:      Thyroid: No thyromegaly.      Trachea: No tracheal deviation.   Cardiovascular:      Rate and Rhythm: Normal rate and regular rhythm.      Heart sounds: Normal heart sounds.   Pulmonary:      Effort: Pulmonary effort is normal.      Breath sounds: Normal breath sounds.   Abdominal:      General: Bowel sounds are normal. There is no distension.      Palpations: Abdomen is soft. There is no mass.      Tenderness: There is no abdominal tenderness.   Musculoskeletal:         General: No edema.   Skin:     Findings: No rash.   Neurological:      Comments: Neuro intact througout   Psychiatric:         Mood and Affect: Mood and affect normal.         Behavior: Behavior normal.         Thought Content: Thought content normal.         Judgment: Judgment normal.         Labs:   No results found for this or any previous visit (from the past 336 hour(s)).  CMP  Sodium   Date Value Ref Range Status   10/15/2021 142 136 - 145 mmol/L Final     Potassium   Date Value Ref Range Status   10/15/2021 4.5 3.5 - 5.1 mmol/L Final     Chloride   Date Value Ref Range Status   10/15/2021 107 95 - 110 mmol/L Final     CO2   Date Value Ref Range Status   10/15/2021 26 23 - 29 mmol/L Final     Glucose   Date Value Ref Range Status   10/15/2021 80 70 - 110 mg/dL Final     BUN   Date Value Ref Range Status   10/15/2021 17 8 - 23 mg/dL Final     Creatinine   Date Value Ref Range Status   10/15/2021 0.8 0.5 - 1.4 mg/dL Final     Calcium   Date Value Ref Range Status   10/15/2021 9.5 8.7 - 10.5 mg/dL Final     Total Protein   Date Value Ref Range Status   05/27/2021 7.2 6.0 - 8.4 g/dL Final     Albumin   Date Value Ref Range Status   05/27/2021 3.8 3.5 - 5.2 g/dL Final     Total Bilirubin   Date Value Ref Range Status   05/27/2021 0.8 0.1 - 1.0 mg/dL Final     Comment:     For infants  and newborns, interpretation of results should be based  on gestational age, weight and in agreement with clinical  observations.    Premature Infant recommended reference ranges:  Up to 24 hours.............<8.0 mg/dL  Up to 48 hours............<12.0 mg/dL  3-5 days..................<15.0 mg/dL  6-29 days.................<15.0 mg/dL       Alkaline Phosphatase   Date Value Ref Range Status   05/27/2021 78 55 - 135 U/L Final     AST   Date Value Ref Range Status   05/27/2021 29 10 - 40 U/L Final     ALT   Date Value Ref Range Status   05/27/2021 20 10 - 44 U/L Final     Anion Gap   Date Value Ref Range Status   10/15/2021 9 8 - 16 mmol/L Final     eGFR if    Date Value Ref Range Status   10/15/2021 >60.0 >60 mL/min/1.73 m^2 Final     eGFR if non    Date Value Ref Range Status   10/15/2021 >60.0 >60 mL/min/1.73 m^2 Final     Comment:     Calculation used to obtain the estimated glomerular filtration  rate (eGFR) is the CKD-EPI equation.        No results found for: CEA  No results found for: PSA        Assessment/Plan:     Problem List Items Addressed This Visit     DCIS-Left Breast-ER/AR pos     Patient is on radiation therapy and is doing well at this time.  I discussed the risks and benefits of Arimidex use in DCIS and she is willing to try this medication.  I will have her begin this after radiation therapy and on 2/1/2022.  Will get baseline dexa scan as well.  Spent > 25 min in total care today.          Relevant Medications    anastrozole (ARIMIDEX) 1 mg Tab    Other Relevant Orders    BONE MIN DENSITY      Other Visit Diagnoses     Long term (current) use of aromatase inhibitors    -  Primary    Relevant Medications    anastrozole (ARIMIDEX) 1 mg Tab    Other Relevant Orders    BONE MIN DENSITY          Discussion:     Follow up in about 11 weeks (around 3/23/2022).      Electronically signed by Mike Lincoln         Patient reports picking up scissor with intent to stab herself in the chest but mother stopped her (age 10), last NSSIB nov 2024

## 2025-06-24 ENCOUNTER — TELEPHONE (OUTPATIENT)
Facility: CLINIC | Age: 65
End: 2025-06-24
Payer: MEDICARE

## 2025-06-24 NOTE — TELEPHONE ENCOUNTER
Left voicemail to remind patient of upcoming appointment 7/2/25. Also reminded patient about labs due for said appointment.

## 2025-07-09 ENCOUNTER — OFFICE VISIT (OUTPATIENT)
Dept: WOUND CARE | Facility: HOSPITAL | Age: 65
End: 2025-07-09
Attending: SURGERY
Payer: MEDICARE

## 2025-07-09 VITALS
HEART RATE: 65 BPM | WEIGHT: 205 LBS | TEMPERATURE: 97 F | HEIGHT: 68 IN | DIASTOLIC BLOOD PRESSURE: 83 MMHG | BODY MASS INDEX: 31.07 KG/M2 | RESPIRATION RATE: 18 BRPM | SYSTOLIC BLOOD PRESSURE: 117 MMHG

## 2025-07-09 DIAGNOSIS — S21.002D OPEN WOUND OF LEFT BREAST, SUBSEQUENT ENCOUNTER: ICD-10-CM

## 2025-07-09 DIAGNOSIS — T81.31XD SURGICAL WOUND DEHISCENCE, SUBSEQUENT ENCOUNTER: Primary | ICD-10-CM

## 2025-07-09 PROCEDURE — 99214 OFFICE O/P EST MOD 30 MIN: CPT | Mod: 25,PN | Performed by: SURGERY

## 2025-07-09 PROCEDURE — 11042 DBRDMT SUBQ TIS 1ST 20SQCM/<: CPT | Mod: PN | Performed by: SURGERY

## 2025-07-09 NOTE — H&P
WOUND CARE    REASON FOR VISIT/CC: Wound care -open wound left breast    HPI: Paula Hernadez is a 65 y.o. female with a past medical history of left breast malignancy status post partial mastectomy and radiation who developed asymmetry requiring mastopexy and fat grafting bilateral breast in April 2024. Developed wound to the left breast which had previously been radiated. Underwent full-thickness skin graft on 06/18/2024 and again at a later date and outside facility. The 1st graft did not take and only 50% of the 2nd graft took. The patient is doing wound care for the inferior lateral aspect which has since healed.  Recently she developed breakdown in a new area along the lateral aspect of the skin graft and breast incision. Has been using regular gauze over the bandage.  No fevers or chills.  No redness.    I have reviewed the patient's chart including prior progress notes, procedures and testing.       HISTORY  Past Medical History:   Diagnosis Date    Arthritis     Breast cancer     Chronic constipation     Chronic insomnia     Floaters in visual field     GERD (gastroesophageal reflux disease)     Seasonal allergies     Seborrheic keratoses     Thumb pain 2018       Past Surgical History:   Procedure Laterality Date    ABDOMINOPLASTY      BREAST BIOPSY      BREAST MASS EXCISION      COSMETIC SURGERY      tummy tuck    CYST REMOVAL  05/27/2021    chest    INTERPOSITION ARTHROPLASTY OF CARPOMETACARPAL JOINTS Left 06/09/2021    Procedure: INTERPOSITION ARTHROPLASTY, CMC JOINT;  Surgeon: Giovani Molina MD;  Location: [facility] OR;  Service: Orthopedics;  Laterality: Left;    LASIK Bilateral     LIPOSUCTION W/ FAT INJECTION Bilateral 04/09/2024    Procedure: LIPOSUCTION, WITH FAT TRANSFER;  Surgeon: Yuniel Cadena MD;  Location: [facility] OR;  Service: Plastics;  Laterality: Bilateral;    MASTECTOMY, PARTIAL Left 10/19/2021    Procedure: MASTECTOMY, PARTIAL;  Surgeon: Lila Perales MD;  Location: [facility] OR;  Service:  General;  Laterality: Left;    MASTOPEXY Bilateral 04/09/2024    Procedure: MASTOPEXY;  Surgeon: Yuniel Cadena MD;  Location: [facility] OR;  Service: Plastics;  Laterality: Bilateral;    NEEDLE LOCALIZATION Left 10/19/2021    Procedure: NEEDLE LOCALIZATION;  Surgeon: Lila Perales MD;  Location: [facility] OR;  Service: General;  Laterality: Left;  LOCALIZER DONE 10/15    SHOULDER SURGERY Left     bone spur    SKIN FULL THICKNESS GRAFT Bilateral 6/18/2024    Procedure: APPLICATION, GRAFT, SKIN, FULL-THICKNESS;  Surgeon: Yuniel Cadena MD;  Location: [facility] OR;  Service: Plastics;  Laterality: Bilateral;    THUMB ARTHROSCOPY Left     TONSILLECTOMY         Social History[1]    Family History   Problem Relation Name Age of Onset    Stroke Mother      Prostate cancer Father      Melanoma Neg Hx      Psoriasis Neg Hx      Lupus Neg Hx      Eczema Neg Hx           MEDS:  Medications Ordered Prior to Encounter[2]    ALLERGIES:  Review of patient's allergies indicates:   Allergen Reactions    Codeine Nausea Only         VITALS:  There were no vitals filed for this visit.      PHYSICAL EXAM:  Physical Exam  Vitals reviewed.   Constitutional:       General: She is not in acute distress.     Appearance: Normal appearance. She is well-developed.   HENT:      Head: Normocephalic and atraumatic.      Nose: Nose normal.   Eyes:      General: No scleral icterus.     Conjunctiva/sclera: Conjunctivae normal.   Neck:      Trachea: No tracheal tenderness or tracheal deviation.   Cardiovascular:      Rate and Rhythm: Normal rate and regular rhythm.      Pulses: Normal pulses.   Pulmonary:      Effort: Pulmonary effort is normal. No accessory muscle usage or respiratory distress.      Breath sounds: Normal breath sounds.   Chest:          Comments: This has about a 1 x 0.75 cm opening with fibrinous exudate which was debrided with a curette. This exposed underlying fatty breast tissue.  This has undermining from 3 to 6 o'clock for 1 cm and 0.5  cm for the remaining wound. No erythema.  Abdominal:      General: There is no distension.      Palpations: Abdomen is soft.      Tenderness: There is no abdominal tenderness.   Musculoskeletal:         General: No swelling or tenderness. Normal range of motion.      Cervical back: Normal range of motion and neck supple. No rigidity.   Skin:     General: Skin is warm and dry.      Coloration: Skin is not jaundiced.      Findings: No erythema.   Neurological:      General: No focal deficit present.      Mental Status: She is alert and oriented to person, place, and time.      Motor: No weakness or abnormal muscle tone.   Psychiatric:         Mood and Affect: Mood normal.         Behavior: Behavior normal.         Thought Content: Thought content normal.         Judgment: Judgment normal.             ASSESSMENT & PLAN:  65 y.o. female with - open wound left breast      1. Debridement of skin and subcutaneous tissue with a curette  2. Recommend lightly packing wound with silver alginate to prevent skin from closing while allowing time for the subcutaneous tissue to heal, silicone border bandage on top, change every other day  3. Return to clinic in 2 weeks  4. Counseled the patient on proper wound care management    See Wound Docs for assessment of wounds and procedure notes      Proper wound care and the possible need for serial debridements, topical medications, specific dressings and biological engineered skin substitutes if indicated  Discussed general issues surrounding recurrent/ nonhealing ulcers, along with the advantages & disadvantages of various treatment strategies.      Patient should call the office immediately if any signs of infection, such as fever, chills, sweats, increased redness or pain.    Patient was instructed to call the clinic or go to the emergency department if their symptoms do not improve, worsens, or if new symptoms develop.  Patient was advised that if any increased swelling, pain, or  numbness arise to go immediately to the ED. Patient knows to call any time if an emergency arises. Shared decision making occurred and patient verbalized understanding in agreement with this plan.       I spent a total of 45 minutes on the day of the visit.This includes face to face time and non-face to face time preparing to see the patient (eg, review of tests), obtaining and/or reviewing separately obtained history, documenting clinical information in the electronic or other health record, independently interpreting results and communicating results to the patient/family/caregiver, or care coordinator.       Much of the documentation for this visit was completed in the Wound Docs system.  Please see the attached documentation for further details about the patient's care. Scanned under the Media tab.                          [1]   Social History  Tobacco Use    Smoking status: Former     Current packs/day: 0.00     Average packs/day: 0.3 packs/day for 27.8 years (6.9 ttl pk-yrs)     Types: Cigarettes     Start date:      Quit date: 10/6/2002     Years since quittin.7    Smokeless tobacco: Never   Substance Use Topics    Alcohol use: Yes     Alcohol/week: 4.0 standard drinks of alcohol     Types: 4 Standard drinks or equivalent per week     Comment: daiquiris, weekly    Drug use: Yes     Frequency: 2.0 times per week     Types: Marijuana     Comment: thc gummies   [2]   Current Outpatient Medications on File Prior to Visit   Medication Sig Dispense Refill    acetaminophen (TYLENOL) 500 MG tablet Take 1,000 mg by mouth every 6 (six) hours as needed for Pain.      anastrozole (ARIMIDEX) 1 mg Tab Take 1 tablet (1 mg total) by mouth once daily. 90 tablet 3    omeprazole (PRILOSEC) 20 MG capsule Take 20 mg by mouth once daily.       temazepam (RESTORIL) 15 mg Cap Take 15 mg by mouth every evening.      venlafaxine (EFFEXOR-XR) 37.5 MG 24 hr capsule Take 1 capsule (37.5 mg total) by mouth once daily. 90 capsule  3     No current facility-administered medications on file prior to visit.

## 2025-07-11 ENCOUNTER — LAB VISIT (OUTPATIENT)
Dept: LAB | Facility: HOSPITAL | Age: 65
End: 2025-07-11
Attending: NURSE PRACTITIONER
Payer: MEDICARE

## 2025-07-11 DIAGNOSIS — D50.8 IRON DEFICIENCY ANEMIA SECONDARY TO INADEQUATE DIETARY IRON INTAKE: ICD-10-CM

## 2025-07-11 LAB
ABSOLUTE EOSINOPHIL (SMH): 0.1 K/UL
ABSOLUTE MONOCYTE (SMH): 0.37 K/UL (ref 0.3–1)
ABSOLUTE NEUTROPHIL COUNT (SMH): 4.4 K/UL (ref 1.8–7.7)
ALBUMIN SERPL-MCNC: 4.5 G/DL (ref 3.5–5.2)
ALP SERPL-CCNC: 90 UNIT/L (ref 55–135)
ALT SERPL-CCNC: 17 UNIT/L (ref 10–44)
ANION GAP (SMH): 9 MMOL/L (ref 8–16)
AST SERPL-CCNC: 22 UNIT/L (ref 10–40)
BASOPHILS # BLD AUTO: 0.03 K/UL
BASOPHILS NFR BLD AUTO: 0.5 %
BILIRUB SERPL-MCNC: 0.7 MG/DL (ref 0.1–1)
BUN SERPL-MCNC: 20 MG/DL (ref 8–23)
CALCIUM SERPL-MCNC: 9.8 MG/DL (ref 8.7–10.5)
CHLORIDE SERPL-SCNC: 107 MMOL/L (ref 95–110)
CO2 SERPL-SCNC: 26 MMOL/L (ref 23–29)
CREAT SERPL-MCNC: 0.8 MG/DL (ref 0.5–1.4)
ERYTHROCYTE [DISTWIDTH] IN BLOOD BY AUTOMATED COUNT: 14 % (ref 11.5–14.5)
FERRITIN SERPL-MCNC: 337.9 NG/ML (ref 20–300)
GFR SERPLBLD CREATININE-BSD FMLA CKD-EPI: >60 ML/MIN/1.73/M2
GLUCOSE SERPL-MCNC: 117 MG/DL (ref 70–110)
HCT VFR BLD AUTO: 44.5 % (ref 37–48.5)
HGB BLD-MCNC: 14.4 GM/DL (ref 12–16)
IMM GRANULOCYTES # BLD AUTO: 0.02 K/UL (ref 0–0.04)
IMM GRANULOCYTES NFR BLD AUTO: 0.3 % (ref 0–0.5)
IRON SATN MFR SERPL: 28 % (ref 20–50)
IRON SERPL-MCNC: 92 UG/DL (ref 30–160)
LYMPHOCYTES # BLD AUTO: 1.21 K/UL (ref 1–4.8)
MCH RBC QN AUTO: 30.5 PG (ref 27–31)
MCHC RBC AUTO-ENTMCNC: 32.4 G/DL (ref 32–36)
MCV RBC AUTO: 94 FL (ref 82–98)
NUCLEATED RBC (/100WBC) (SMH): 0 /100 WBC
PLATELET # BLD AUTO: 196 K/UL (ref 150–450)
PMV BLD AUTO: 10.4 FL (ref 9.2–12.9)
POTASSIUM SERPL-SCNC: 4 MMOL/L (ref 3.5–5.1)
PROT SERPL-MCNC: 7.6 GM/DL (ref 6–8.4)
RBC # BLD AUTO: 4.72 M/UL (ref 4–5.4)
RELATIVE EOSINOPHIL (SMH): 1.6 % (ref 0–8)
RELATIVE LYMPHOCYTE (SMH): 19.6 % (ref 18–48)
RELATIVE MONOCYTE (SMH): 6 % (ref 4–15)
RELATIVE NEUTROPHIL (SMH): 72 % (ref 38–73)
SODIUM SERPL-SCNC: 142 MMOL/L (ref 136–145)
TIBC SERPL-MCNC: 329 UG/DL (ref 250–450)
TRANSFERRIN SERPL-MCNC: 235 MG/DL (ref 200–375)
WBC # BLD AUTO: 6.16 K/UL (ref 3.9–12.7)

## 2025-07-11 PROCEDURE — 85025 COMPLETE CBC W/AUTO DIFF WBC: CPT

## 2025-07-11 PROCEDURE — 82728 ASSAY OF FERRITIN: CPT

## 2025-07-11 PROCEDURE — 84466 ASSAY OF TRANSFERRIN: CPT

## 2025-07-11 PROCEDURE — 36415 COLL VENOUS BLD VENIPUNCTURE: CPT

## 2025-07-11 PROCEDURE — 80053 COMPREHEN METABOLIC PANEL: CPT

## 2025-07-16 ENCOUNTER — OFFICE VISIT (OUTPATIENT)
Facility: CLINIC | Age: 65
End: 2025-07-16
Payer: MEDICARE

## 2025-07-16 VITALS
HEART RATE: 77 BPM | TEMPERATURE: 98 F | BODY MASS INDEX: 31.69 KG/M2 | DIASTOLIC BLOOD PRESSURE: 55 MMHG | SYSTOLIC BLOOD PRESSURE: 119 MMHG | RESPIRATION RATE: 17 BRPM | WEIGHT: 208.38 LBS

## 2025-07-16 DIAGNOSIS — D05.12 DUCTAL CARCINOMA IN SITU (DCIS) OF LEFT BREAST: Primary | ICD-10-CM

## 2025-07-16 DIAGNOSIS — R79.89 ELEVATED FERRITIN: ICD-10-CM

## 2025-07-16 PROCEDURE — 99213 OFFICE O/P EST LOW 20 MIN: CPT | Mod: PBBFAC,PN | Performed by: INTERNAL MEDICINE

## 2025-07-16 PROCEDURE — 99214 OFFICE O/P EST MOD 30 MIN: CPT | Mod: S$PBB,,, | Performed by: INTERNAL MEDICINE

## 2025-07-16 PROCEDURE — 99999 PR PBB SHADOW E&M-EST. PATIENT-LVL III: CPT | Mod: PBBFAC,,, | Performed by: INTERNAL MEDICINE

## 2025-07-16 PROCEDURE — G2211 COMPLEX E/M VISIT ADD ON: HCPCS | Mod: ,,, | Performed by: INTERNAL MEDICINE

## 2025-07-16 NOTE — ASSESSMENT & PLAN NOTE
Patient is doing ok today.  She had an MRI of the breast done in March this year which was negative and she overall appears ZENA.  She continue son Arimidex and is doing ok with this noting hot flashes as a side effect.  Will continue to see her on a six month basis and discussed this today.

## 2025-07-16 NOTE — ASSESSMENT & PLAN NOTE
She did have IV iron x 2 in November 2024.  This still remains high which could be due to her healing breast issues.  Will monitor this for now and check again with next follow up.

## 2025-07-16 NOTE — PROGRESS NOTES
PROGRESS NOTE    Subjective:       Patient ID: Paula Hernadez is a 65 y.o. female.    9/29/2021  Dx Mammo:  microcalc in left breast at 4-5 o'clock area.     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     1/19/2022  Radiation complete    2/9/2022  Arimidex started.     1/19/2022:  Dexa: Normal    6/20/2022:  Bilateral mammogram negative  Recheck left breast in 6 months(surgical changes)    1/11/2023:  Left breast microcal biopsy:  BREAST, LEFT, MICROCALCIFICATIONS, STEREOTACTIC GUIDED CORE BIOPSY   - FAT NECROSIS AND FIBROSIS WITH ASSOCIATED MICROCALCIFICATIONS    3/20/2025-MRI Breast:  negative     Chief Complaint:  No chief complaint on file.  DCIS follow up    History of Present Illness:   Paula Hernadez is a 65 y.o. female who presents for follow up of above.      Ms. Hernadez is doing well today.      She remains on Arimidex as of today, 7/16/2025      Family and Social history reviewed and is unchanged from 11/17/2021      ROS:  Review of Systems   Constitutional: Negative for fever.   Respiratory: Negative for shortness of breath.    Cardiovascular: Negative for chest pain and leg swelling.   Gastrointestinal: Negative for abdominal pain and blood in stool.   Genitourinary: Negative for hematuria.   Skin: Negative for rash.          Current Outpatient Medications:     acetaminophen (TYLENOL) 500 MG tablet, Take 1,000 mg by mouth every 6 (six) hours as needed for Pain., Disp: , Rfl:     anastrozole (ARIMIDEX) 1 mg Tab, Take 1 tablet (1 mg total) by mouth once daily., Disp: 90 tablet, Rfl: 3    omeprazole (PRILOSEC) 20 MG capsule, Take 20 mg by mouth once daily. , Disp: , Rfl:     venlafaxine (EFFEXOR-XR) 37.5 MG 24 hr capsule, Take 1 capsule (37.5 mg total) by mouth once daily., Disp: 90 capsule, Rfl: 3        Objective:       Physical Examination:     BP  (!) 119/55   Pulse 77   Temp 98.3 °F (36.8 °C)   Resp 17   Wt 94.5 kg (208 lb 6.4 oz)   LMP 01/01/2012 Comment: menopause  BMI 31.69 kg/m²     Physical Exam  Constitutional:       Appearance: She is well-developed and well-nourished.   HENT:      Head: Normocephalic and atraumatic.      Right Ear: External ear normal.      Left Ear: External ear normal.      Mouth/Throat:      Mouth: Oropharynx is clear and moist.   Eyes:      Conjunctiva/sclera: Conjunctivae normal.      Pupils: Pupils are equal, round, and reactive to light.   Neck:      Thyroid: No thyromegaly.      Trachea: No tracheal deviation.   Cardiovascular:      Rate and Rhythm: Normal rate and regular rhythm.      Heart sounds: Normal heart sounds.   Pulmonary:      Effort: Pulmonary effort is normal.      Breath sounds: Normal breath sounds.   Abdominal:      General: Bowel sounds are normal. There is no distension.      Palpations: Abdomen is soft. There is no mass.      Tenderness: There is no abdominal tenderness.   Musculoskeletal:         General: No edema.   Skin:     Findings: No rash.   Neurological:      Comments: Neuro intact througout   Psychiatric:         Mood and Affect: Mood and affect normal.         Behavior: Behavior normal.         Thought Content: Thought content normal.         Judgment: Judgment normal.    BREAST:Breast exam is negative bilaterally.  No masses, no rash, no skin changes. No adenopathy is present in the cervical, supraclavicular, infraclavicular or axillary regions.  No palpable lesion in area of left breast concern           Labs:   Recent Results (from the past 2 weeks)   CBC with Differential    Collection Time: 07/11/25 10:07 AM   Result Value Ref Range    WBC 6.16 3.90 - 12.70 K/uL    Hgb 14.4 12.0 - 16.0 gm/dL    Hct 44.5 37.0 - 48.5 %    Platelet Count 196 150 - 450 K/uL       CMP  Sodium   Date Value Ref Range Status   07/11/2025 142 136 - 145 mmol/L Final     Potassium   Date Value Ref Range Status  "  07/11/2025 4.0 3.5 - 5.1 mmol/L Final     Chloride   Date Value Ref Range Status   07/11/2025 107 95 - 110 mmol/L Final     CO2   Date Value Ref Range Status   07/11/2025 26 23 - 29 mmol/L Final     Glucose   Date Value Ref Range Status   07/11/2025 117 (H) 70 - 110 mg/dL Final     BUN   Date Value Ref Range Status   07/11/2025 20 8 - 23 mg/dL Final     Creatinine   Date Value Ref Range Status   07/11/2025 0.8 0.5 - 1.4 mg/dL Final     Calcium   Date Value Ref Range Status   07/11/2025 9.8 8.7 - 10.5 mg/dL Final     Protein Total   Date Value Ref Range Status   07/11/2025 7.6 6.0 - 8.4 gm/dL Final     Albumin   Date Value Ref Range Status   07/11/2025 4.5 3.5 - 5.2 g/dL Final     Bilirubin Total   Date Value Ref Range Status   07/11/2025 0.7 0.1 - 1.0 mg/dL Final     Comment:     For infants and newborns, interpretation of results should be based   on gestational age, weight and in agreement with clinical   observations.    Premature Infant recommended reference ranges:   0-24 hours:  <8.0 mg/dL   24-48 hours: <12.0 mg/dL   3-5 days:    <15.0 mg/dL   6-29 days:   <15.0 mg/dL     ALP   Date Value Ref Range Status   07/11/2025 90 55 - 135 unit/L Final     AST   Date Value Ref Range Status   07/11/2025 22 10 - 40 unit/L Final     ALT   Date Value Ref Range Status   07/11/2025 17 10 - 44 unit/L Final     Anion Gap   Date Value Ref Range Status   07/11/2025 9 8 - 16 mmol/L Final     eGFR if    Date Value Ref Range Status   06/15/2022 >60.0 >60 mL/min/1.73 m^2 Final     eGFR if non    Date Value Ref Range Status   06/15/2022 >60.0 >60 mL/min/1.73 m^2 Final     Comment:     Calculation used to obtain the estimated glomerular filtration  rate (eGFR) is the CKD-EPI equation.        No results found for: "CEA"  No results found for: "PSA"        Assessment/Plan:     Problem List Items Addressed This Visit       DCIS-Left Breast-ER/WA pos - Primary    Patient is doing ok today.  She had an MRI " of the breast done in March this year which was negative and she overall appears ZENA.  She continue son Arimidex and is doing ok with this noting hot flashes as a side effect.  Will continue to see her on a six month basis and discussed this today.          Relevant Orders    CBC Auto Differential    Comprehensive Metabolic Panel    CBC Auto Differential    Comprehensive Metabolic Panel    Ferritin    Elevated ferritin    She did have IV iron x 2 in November 2024.  This still remains high which could be due to her healing breast issues.  Will monitor this for now and check again with next follow up.                     Discussion:     Follow up in about 4 months (around 11/16/2025).      Electronically signed by Mike Lincoln

## 2025-07-23 ENCOUNTER — OFFICE VISIT (OUTPATIENT)
Dept: WOUND CARE | Facility: HOSPITAL | Age: 65
End: 2025-07-23
Attending: SURGERY
Payer: MEDICARE

## 2025-07-23 VITALS
RESPIRATION RATE: 18 BRPM | DIASTOLIC BLOOD PRESSURE: 73 MMHG | HEART RATE: 66 BPM | TEMPERATURE: 98 F | SYSTOLIC BLOOD PRESSURE: 111 MMHG

## 2025-07-23 DIAGNOSIS — T81.31XD SURGICAL WOUND DEHISCENCE, SUBSEQUENT ENCOUNTER: Primary | ICD-10-CM

## 2025-07-23 DIAGNOSIS — S21.002D OPEN WOUND OF LEFT BREAST, SUBSEQUENT ENCOUNTER: ICD-10-CM

## 2025-07-23 PROCEDURE — 99213 OFFICE O/P EST LOW 20 MIN: CPT | Mod: PN | Performed by: SURGERY

## 2025-07-23 PROCEDURE — 99212 OFFICE O/P EST SF 10 MIN: CPT | Mod: ,,, | Performed by: SURGERY

## 2025-07-23 NOTE — PROGRESS NOTES
WOUND CARE    REASON FOR VISIT/CC: Wound care -open wound left breast    HPI: Paula Hernadez is a 65 y.o. female with a past medical history of left breast malignancy status post partial mastectomy and radiation who developed asymmetry requiring mastopexy and fat grafting bilateral breast in April 2024. Developed wound to the left breast which had previously been radiated. Underwent full-thickness skin graft on 06/18/2024 and again at a later date and outside facility. The 1st graft did not take and only 50% of the 2nd graft took. The patient is doing wound care for the inferior lateral aspect which has since healed.  Recently she developed breakdown in a new area along the lateral aspect of the skin graft and breast incision.    At last visit 2 weeks ago had the patient used silver alginate and silicone border bandage. I do believe there has been some improvement. The patient is has been who provides her wound care however feels the Hydrofera blue had worked well in the past for other wounds.    I have reviewed the patient's chart including prior progress notes, procedures and testing.       HISTORY  Past Medical History:   Diagnosis Date    Arthritis     Breast cancer     Chronic constipation     Chronic insomnia     Floaters in visual field     GERD (gastroesophageal reflux disease)     Seasonal allergies     Seborrheic keratoses     Thumb pain 2018       Past Surgical History:   Procedure Laterality Date    ABDOMINOPLASTY      BREAST BIOPSY      BREAST MASS EXCISION      COSMETIC SURGERY      tummy tuck    CYST REMOVAL  05/27/2021    chest    INTERPOSITION ARTHROPLASTY OF CARPOMETACARPAL JOINTS Left 06/09/2021    Procedure: INTERPOSITION ARTHROPLASTY, CMC JOINT;  Surgeon: Giovani Molina MD;  Location: [facility];  Service: Orthopedics;  Laterality: Left;    LASIK Bilateral     LIPOSUCTION W/ FAT INJECTION Bilateral 04/09/2024    Procedure: LIPOSUCTION, WITH FAT TRANSFER;  Surgeon: Yuniel Cadena MD;  Location:  [facility] OR;  Service: Plastics;  Laterality: Bilateral;    MASTECTOMY, PARTIAL Left 10/19/2021    Procedure: MASTECTOMY, PARTIAL;  Surgeon: Lila Perales MD;  Location: [facility] OR;  Service: General;  Laterality: Left;    MASTOPEXY Bilateral 04/09/2024    Procedure: MASTOPEXY;  Surgeon: Yuniel Cadena MD;  Location: [facility] OR;  Service: Plastics;  Laterality: Bilateral;    NEEDLE LOCALIZATION Left 10/19/2021    Procedure: NEEDLE LOCALIZATION;  Surgeon: Lila Perales MD;  Location: [facility];  Service: General;  Laterality: Left;  LOCALIZER DONE 10/15    SHOULDER SURGERY Left     bone spur    SKIN FULL THICKNESS GRAFT Bilateral 6/18/2024    Procedure: APPLICATION, GRAFT, SKIN, FULL-THICKNESS;  Surgeon: Yuniel Cadena MD;  Location: [facility];  Service: Plastics;  Laterality: Bilateral;    THUMB ARTHROSCOPY Left     TONSILLECTOMY         Social History[1]    Family History   Problem Relation Name Age of Onset    Stroke Mother      Prostate cancer Father      Melanoma Neg Hx      Psoriasis Neg Hx      Lupus Neg Hx      Eczema Neg Hx           MEDS:  Medications Ordered Prior to Encounter[2]    ALLERGIES:  Review of patient's allergies indicates:   Allergen Reactions    Codeine Nausea Only         VITALS:  There were no vitals filed for this visit.      PHYSICAL EXAM:  Physical Exam  Vitals reviewed.   Constitutional:       General: She is not in acute distress.     Appearance: Normal appearance. She is well-developed.   HENT:      Head: Normocephalic and atraumatic.      Nose: Nose normal.   Eyes:      General: No scleral icterus.     Conjunctiva/sclera: Conjunctivae normal.   Neck:      Trachea: No tracheal tenderness or tracheal deviation.   Cardiovascular:      Rate and Rhythm: Normal rate and regular rhythm.      Pulses: Normal pulses.   Pulmonary:      Effort: Pulmonary effort is normal. No accessory muscle usage or respiratory distress.      Breath sounds: Normal breath sounds.   Chest:          Comments: This has  about a 1 x 0.75 cm opening with very minimal fibrinous exudate that did not require debridement. The undermining from 3-6 o'clock appears to have improved to about 0.5 cm down from 1 cm. Along the medial aspect there was very minimal maybe 0.25 cm undermining. Mild irritation of the surrounding skin  Abdominal:      General: There is no distension.      Palpations: Abdomen is soft.      Tenderness: There is no abdominal tenderness.   Musculoskeletal:         General: No swelling or tenderness. Normal range of motion.      Cervical back: Normal range of motion and neck supple. No rigidity.   Skin:     General: Skin is warm and dry.      Coloration: Skin is not jaundiced.      Findings: No erythema.   Neurological:      General: No focal deficit present.      Mental Status: She is alert and oriented to person, place, and time.      Motor: No weakness or abnormal muscle tone.   Psychiatric:         Mood and Affect: Mood normal.         Behavior: Behavior normal.         Thought Content: Thought content normal.         Judgment: Judgment normal.             ASSESSMENT & PLAN:  65 y.o. female with - open wound left breast      1. No debridement required  2. Will switch back to Hydrofera blue in his instructed the patient is has been due insert a small disc of the bandage into the wound bed and just slightly to the lateral aspect in the undermined portion, can continue silicone border bandage on top, change every other day  3. Return to clinic in 2 weeks  4. Counseled the patient on proper wound care management    See Wound Docs for assessment of wounds and procedure notes      Proper wound care and the possible need for serial debridements, topical medications, specific dressings and biological engineered skin substitutes if indicated  Discussed general issues surrounding recurrent/ nonhealing ulcers, along with the advantages & disadvantages of various treatment strategies.      Patient should call the office  immediately if any signs of infection, such as fever, chills, sweats, increased redness or pain.    Patient was instructed to call the clinic or go to the emergency department if their symptoms do not improve, worsens, or if new symptoms develop.  Patient was advised that if any increased swelling, pain, or numbness arise to go immediately to the ED. Patient knows to call any time if an emergency arises. Shared decision making occurred and patient verbalized understanding in agreement with this plan.       I spent a total of 45 minutes on the day of the visit.This includes face to face time and non-face to face time preparing to see the patient (eg, review of tests), obtaining and/or reviewing separately obtained history, documenting clinical information in the electronic or other health record, independently interpreting results and communicating results to the patient/family/caregiver, or care coordinator.       Much of the documentation for this visit was completed in the Wound Docs system.  Please see the attached documentation for further details about the patient's care. Scanned under the Media tab.                            [1]   Social History  Tobacco Use    Smoking status: Former     Current packs/day: 0.00     Average packs/day: 0.3 packs/day for 27.8 years (6.9 ttl pk-yrs)     Types: Cigarettes     Start date:      Quit date: 10/6/2002     Years since quittin.8    Smokeless tobacco: Never   Substance Use Topics    Alcohol use: Yes     Alcohol/week: 4.0 standard drinks of alcohol     Types: 4 Standard drinks or equivalent per week     Comment: daiquiris, weekly    Drug use: Yes     Frequency: 2.0 times per week     Types: Marijuana     Comment: thc gummies   [2]   Current Outpatient Medications on File Prior to Visit   Medication Sig Dispense Refill    acetaminophen (TYLENOL) 500 MG tablet Take 1,000 mg by mouth every 6 (six) hours as needed for Pain.      anastrozole (ARIMIDEX) 1 mg Tab Take 1  tablet (1 mg total) by mouth once daily. 90 tablet 3    omeprazole (PRILOSEC) 20 MG capsule Take 20 mg by mouth once daily.       venlafaxine (EFFEXOR-XR) 37.5 MG 24 hr capsule Take 1 capsule (37.5 mg total) by mouth once daily. 90 capsule 3     No current facility-administered medications on file prior to visit.

## 2025-08-06 ENCOUNTER — OFFICE VISIT (OUTPATIENT)
Dept: WOUND CARE | Facility: HOSPITAL | Age: 65
End: 2025-08-06
Attending: SURGERY
Payer: MEDICARE

## 2025-08-06 VITALS
HEART RATE: 71 BPM | SYSTOLIC BLOOD PRESSURE: 126 MMHG | TEMPERATURE: 97 F | DIASTOLIC BLOOD PRESSURE: 61 MMHG | RESPIRATION RATE: 18 BRPM

## 2025-08-06 DIAGNOSIS — T81.31XD SURGICAL WOUND DEHISCENCE, SUBSEQUENT ENCOUNTER: Primary | ICD-10-CM

## 2025-08-06 DIAGNOSIS — S21.002D OPEN WOUND OF LEFT BREAST, SUBSEQUENT ENCOUNTER: ICD-10-CM

## 2025-08-06 DIAGNOSIS — D05.12 DUCTAL CARCINOMA IN SITU (DCIS) OF LEFT BREAST: ICD-10-CM

## 2025-08-06 DIAGNOSIS — T86.829 SKIN GRAFT DISORDER: ICD-10-CM

## 2025-08-06 PROCEDURE — 17250 CHEM CAUT OF GRANLTJ TISSUE: CPT | Mod: PN | Performed by: SURGERY

## 2025-08-06 NOTE — PROGRESS NOTES
WOUND CARE    REASON FOR VISIT/CC: Wound care -open wound left breast    HPI: Paula Hernadez is a 65 y.o. female with a past medical history of left breast malignancy status post partial mastectomy and radiation who developed asymmetry requiring mastopexy and fat grafting bilateral breast in April 2024. Developed wound to the left breast which had previously been radiated. Underwent full-thickness skin graft on 06/18/2024 and again at a later date and outside facility. The 1st graft did not take and only 50% of the 2nd graft took. The patient is doing wound care for the inferior lateral aspect which has since healed.  Recently she developed breakdown in a new area along the lateral aspect of the skin graft and breast incision.    At last visit 2 weeks ago had the patient used silver alginate and silicone border bandage. I do believe there has been some improvement. The patient  been who provides her wound care however feels the Hydrofera blue had worked well in the past for other wounds.    I have reviewed the patient's chart including prior progress notes, procedures and testing.       HISTORY  Past Medical History:   Diagnosis Date    Arthritis     Breast cancer     Chronic constipation     Chronic insomnia     Floaters in visual field     GERD (gastroesophageal reflux disease)     Seasonal allergies     Seborrheic keratoses     Thumb pain 2018       Past Surgical History:   Procedure Laterality Date    ABDOMINOPLASTY      BREAST BIOPSY      BREAST MASS EXCISION      COSMETIC SURGERY      tummy tuck    CYST REMOVAL  05/27/2021    chest    INTERPOSITION ARTHROPLASTY OF CARPOMETACARPAL JOINTS Left 06/09/2021    Procedure: INTERPOSITION ARTHROPLASTY, [facility] JOINT;  Surgeon: Giovani Molina MD;  Location: [facility];  Service: Orthopedics;  Laterality: Left;    LASIK Bilateral     LIPOSUCTION W/ FAT INJECTION Bilateral 04/09/2024    Procedure: LIPOSUCTION, WITH FAT TRANSFER;  Surgeon: Yuniel Cadena MD;  Location:  [facility] OR;  Service: Plastics;  Laterality: Bilateral;    MASTECTOMY, PARTIAL Left 10/19/2021    Procedure: MASTECTOMY, PARTIAL;  Surgeon: Lila Perales MD;  Location: [facility] OR;  Service: General;  Laterality: Left;    MASTOPEXY Bilateral 04/09/2024    Procedure: MASTOPEXY;  Surgeon: Yuniel Cadena MD;  Location: [facility] OR;  Service: Plastics;  Laterality: Bilateral;    NEEDLE LOCALIZATION Left 10/19/2021    Procedure: NEEDLE LOCALIZATION;  Surgeon: Lila Perales MD;  Location: [facility];  Service: General;  Laterality: Left;  LOCALIZER DONE 10/15    SHOULDER SURGERY Left     bone spur    SKIN FULL THICKNESS GRAFT Bilateral 6/18/2024    Procedure: APPLICATION, GRAFT, SKIN, FULL-THICKNESS;  Surgeon: Yuniel Cadena MD;  Location: [facility];  Service: Plastics;  Laterality: Bilateral;    THUMB ARTHROSCOPY Left     TONSILLECTOMY         Social History[1]    Family History   Problem Relation Name Age of Onset    Stroke Mother      Prostate cancer Father      Melanoma Neg Hx      Psoriasis Neg Hx      Lupus Neg Hx      Eczema Neg Hx           MEDS:  Medications Ordered Prior to Encounter[2]    ALLERGIES:  Review of patient's allergies indicates:   Allergen Reactions    Codeine Nausea Only         VITALS:  There were no vitals filed for this visit.      PHYSICAL EXAM:  Physical Exam  Vitals reviewed.   Constitutional:       General: She is not in acute distress.     Appearance: Normal appearance. She is well-developed.   HENT:      Head: Normocephalic and atraumatic.      Nose: Nose normal.   Eyes:      General: No scleral icterus.     Conjunctiva/sclera: Conjunctivae normal.   Neck:      Trachea: No tracheal tenderness or tracheal deviation.   Cardiovascular:      Rate and Rhythm: Normal rate and regular rhythm.      Pulses: Normal pulses.   Pulmonary:      Effort: Pulmonary effort is normal. No accessory muscle usage or respiratory distress.      Breath sounds: Normal breath sounds.   Chest:          Comments: Wound bed  remains open with no significant exudate, this has granulation tissue beginning to form especially in the central portion and inferior portion. Wound undermines about half a cm from 2-5 o'clock  Abdominal:      General: There is no distension.      Palpations: Abdomen is soft.      Tenderness: There is no abdominal tenderness.   Musculoskeletal:         General: No swelling or tenderness. Normal range of motion.      Cervical back: Normal range of motion and neck supple. No rigidity.   Skin:     General: Skin is warm and dry.      Coloration: Skin is not jaundiced.      Findings: No erythema.   Neurological:      General: No focal deficit present.      Mental Status: She is alert and oriented to person, place, and time.      Motor: No weakness or abnormal muscle tone.   Psychiatric:         Mood and Affect: Mood normal.         Behavior: Behavior normal.         Thought Content: Thought content normal.         Judgment: Judgment normal.             ASSESSMENT & PLAN:  65 y.o. female with - open wound left breast      1. No debridement , area of hypergranulation was present and cauterized with silver nitrate  2. Continue Hydrofera blue disc into wound bed it may also applied in the medial aspect of the skin which this has now excoriated, avoid tape to areas of excoriation of the skin  3. Return to clinic in 2 weeks  4. Counseled the patient on proper wound care management    See Wound Docs for assessment of wounds and procedure notes      Proper wound care and the possible need for serial debridements, topical medications, specific dressings and biological engineered skin substitutes if indicated  Discussed general issues surrounding recurrent/ nonhealing ulcers, along with the advantages & disadvantages of various treatment strategies.      Patient should call the office immediately if any signs of infection, such as fever, chills, sweats, increased redness or pain.    Patient was instructed to call the clinic or go  to the emergency department if their symptoms do not improve, worsens, or if new symptoms develop.  Patient was advised that if any increased swelling, pain, or numbness arise to go immediately to the ED. Patient knows to call any time if an emergency arises. Shared decision making occurred and patient verbalized understanding in agreement with this plan.       I spent a total of 45 minutes on the day of the visit.This includes face to face time and non-face to face time preparing to see the patient (eg, review of tests), obtaining and/or reviewing separately obtained history, documenting clinical information in the electronic or other health record, independently interpreting results and communicating results to the patient/family/caregiver, or care coordinator.       Much of the documentation for this visit was completed in the Wound Docs system.  Please see the attached documentation for further details about the patient's care. Scanned under the Media tab.                              [1]   Social History  Tobacco Use    Smoking status: Former     Current packs/day: 0.00     Average packs/day: 0.3 packs/day for 27.8 years (6.9 ttl pk-yrs)     Types: Cigarettes     Start date:      Quit date: 10/6/2002     Years since quittin.8    Smokeless tobacco: Never   Substance Use Topics    Alcohol use: Yes     Alcohol/week: 4.0 standard drinks of alcohol     Types: 4 Standard drinks or equivalent per week     Comment: daiquiris, weekly    Drug use: Yes     Frequency: 2.0 times per week     Types: Marijuana     Comment: thc gummies   [2]   Current Outpatient Medications on File Prior to Visit   Medication Sig Dispense Refill    acetaminophen (TYLENOL) 500 MG tablet Take 1,000 mg by mouth every 6 (six) hours as needed for Pain.      anastrozole (ARIMIDEX) 1 mg Tab Take 1 tablet (1 mg total) by mouth once daily. 90 tablet 3    omeprazole (PRILOSEC) 20 MG capsule Take 20 mg by mouth once daily.       venlafaxine  (EFFEXOR-XR) 37.5 MG 24 hr capsule Take 1 capsule (37.5 mg total) by mouth once daily. 90 capsule 3     No current facility-administered medications on file prior to visit.

## 2025-08-12 DIAGNOSIS — R23.2 HOT FLASHES: ICD-10-CM

## 2025-08-12 RX ORDER — VENLAFAXINE HYDROCHLORIDE 37.5 MG/1
37.5 CAPSULE, EXTENDED RELEASE ORAL DAILY
Qty: 90 CAPSULE | Refills: 3 | Status: SHIPPED | OUTPATIENT
Start: 2025-08-12 | End: 2026-08-12

## 2025-08-20 ENCOUNTER — OFFICE VISIT (OUTPATIENT)
Dept: WOUND CARE | Facility: HOSPITAL | Age: 65
End: 2025-08-20
Attending: SURGERY
Payer: MEDICARE

## 2025-08-20 VITALS — TEMPERATURE: 98 F | SYSTOLIC BLOOD PRESSURE: 151 MMHG | DIASTOLIC BLOOD PRESSURE: 68 MMHG | RESPIRATION RATE: 16 BRPM

## 2025-08-20 DIAGNOSIS — T14.8XXA CHRONIC WOUND: ICD-10-CM

## 2025-08-20 DIAGNOSIS — S21.002D OPEN WOUND OF LEFT BREAST, SUBSEQUENT ENCOUNTER: ICD-10-CM

## 2025-08-20 DIAGNOSIS — T81.31XD SURGICAL WOUND DEHISCENCE, SUBSEQUENT ENCOUNTER: Primary | ICD-10-CM

## 2025-08-20 PROCEDURE — 99212 OFFICE O/P EST SF 10 MIN: CPT | Mod: ,,, | Performed by: SURGERY

## 2025-08-20 PROCEDURE — 99213 OFFICE O/P EST LOW 20 MIN: CPT | Mod: PN | Performed by: SURGERY

## 2025-09-03 ENCOUNTER — OFFICE VISIT (OUTPATIENT)
Dept: WOUND CARE | Facility: HOSPITAL | Age: 65
End: 2025-09-03
Attending: SURGERY
Payer: MEDICARE

## 2025-09-03 VITALS
DIASTOLIC BLOOD PRESSURE: 68 MMHG | RESPIRATION RATE: 20 BRPM | TEMPERATURE: 97 F | HEART RATE: 64 BPM | SYSTOLIC BLOOD PRESSURE: 136 MMHG

## 2025-09-03 DIAGNOSIS — S21.002D OPEN WOUND OF LEFT BREAST, SUBSEQUENT ENCOUNTER: ICD-10-CM

## 2025-09-03 DIAGNOSIS — T81.31XD SURGICAL WOUND DEHISCENCE, SUBSEQUENT ENCOUNTER: Primary | ICD-10-CM

## 2025-09-03 PROCEDURE — 99214 OFFICE O/P EST MOD 30 MIN: CPT | Mod: PN | Performed by: SURGERY

## 2025-09-03 PROCEDURE — 99212 OFFICE O/P EST SF 10 MIN: CPT | Mod: ,,, | Performed by: SURGERY

## (undated) DEVICE — SUTURE SILK 3-0 18 A184H

## (undated) DEVICE — ADHESIVE DERMABOND ADVANCED

## (undated) DEVICE — Device

## (undated) DEVICE — PACK UNIVERSAL I DRAPES

## (undated) DEVICE — DRAPE THREE-QUARTER 53X77IN

## (undated) DEVICE — TUBING SUC UNIV W/CONN 12FT

## (undated) DEVICE — SPONGE GAUZE 2S 4X4 STERILE NON21424

## (undated) DEVICE — SUT SILK 3-0 SH 18IN BLACK

## (undated) DEVICE — COVER LIGHT HANDLE LB53

## (undated) DEVICE — LOCALIZER 20 PROBE SET US

## (undated) DEVICE — BLADE SURG #15 CARBON STEEL

## (undated) DEVICE — BANDAGE ACE STERILE 4 REB3114

## (undated) DEVICE — PREP CHLORA 26ML

## (undated) DEVICE — SYR ONLY LUER LOCK 20CC

## (undated) DEVICE — GLOVE BIOGEL SKINSENSE PI 8.0

## (undated) DEVICE — SUT STRATAFIX PDS 2 CT-1 14IN

## (undated) DEVICE — ELECTRODE REM PLYHSV RETURN 9

## (undated) DEVICE — SUTURE VICRYL 1 CT-1 27 J261H

## (undated) DEVICE — PAD BOVIE ADULT

## (undated) DEVICE — BRA BELLA POST OP XLG

## (undated) DEVICE — UNDERGLOVE BIOGEL MICRO BLUE SZ 8.5

## (undated) DEVICE — GLOVE BIOGEL MICRO SURGEON PINK SZ 7

## (undated) DEVICE — SUTURE MONOCRYL 3-0 27 PS-1 MCP936H

## (undated) DEVICE — CLEANER CAUT TIP STRL 2X2IN

## (undated) DEVICE — DRESSING ADAPTIC N ADH 3X8IN

## (undated) DEVICE — SOLUTION IRRI NS BOTTLE 1000ML R5200-01

## (undated) DEVICE — MARKER DUAL TIP SKIN W/ RULER

## (undated) DEVICE — SUTURE STRATAFIX SPIRAL 3-0 60CM PS-2

## (undated) DEVICE — STAPLER SKIN SUBCUTICULAR

## (undated) DEVICE — DRESSING POST OP MEPILEX AG 4X8

## (undated) DEVICE — DRAPE FLUID WARMER ORS 44X44IN

## (undated) DEVICE — TIP BOVIE ENT 2.75      E1455

## (undated) DEVICE — TRAY GENERAL SURGERY

## (undated) DEVICE — SUT QUILL MONODERM PS 2-0 14CM

## (undated) DEVICE — SUTURE VICRYL 2-0 CT-1   J739D

## (undated) DEVICE — SPONGE LAP STRL 18X18

## (undated) DEVICE — BLADE SCALPEL #15 371115

## (undated) DEVICE — TRAY CATH 1-LYR URIMTR 16FR

## (undated) DEVICE — SUTURE VICRYL 4-0 18 VCP773D

## (undated) DEVICE — BLADE SURG CARBON STEEL #10

## (undated) DEVICE — SYR 10CC LUER LOCK

## (undated) DEVICE — GLOVE BIOGEL SKINSENSE PI 6.5

## (undated) DEVICE — SUT VICRYL PLUS 2-0 CT1 18

## (undated) DEVICE — GOWN AERO CHROME W/ TOWEL XL

## (undated) DEVICE — DRAPE 3/4

## (undated) DEVICE — SUT QUILL MONODERM PS 2-0 30CM

## (undated) DEVICE — SUTURE ETHILON 3-0 PS-1 18 1663H

## (undated) DEVICE — ADHESIVE MASTISOL VIAL 48/BX

## (undated) DEVICE — GOWN SMART IMP BREATHABLE XXLG

## (undated) DEVICE — TOWEL OR DISP STRL BLUE 4/PK

## (undated) DEVICE — SYS PRINEO SKIN CLOSURE

## (undated) DEVICE — DRAPE C-ARM (FITS NEW C-ARM) 07-CA108

## (undated) DEVICE — MARKER TISSUE MARGIN

## (undated) DEVICE — TRAY GENERAL SURGERY SMH

## (undated) DEVICE — SYRINGE SAFETY 1ML TB 27GA X 1/2 305789

## (undated) DEVICE — DRAPE LAPAROTOMY TRANSVERSE 89281

## (undated) DEVICE — CLOSURE SKIN STERI STRIP 1/2X4

## (undated) DEVICE — TIP YANKAUERS BULB NO VENT

## (undated) DEVICE — TRAY UPPER EXTREMITY

## (undated) DEVICE — SYS REVOLVE FAT PROCESSING

## (undated) DEVICE — APPLICATOR CHLORAPREP ORN 26ML

## (undated) DEVICE — DISSECTOR EXACT LIGASURE 20.6MM-21CM

## (undated) DEVICE — GLOVE BIOGEL PI GOLD SZ  8.5

## (undated) DEVICE — GAUZE X RAY STRL 16PLY 4X4IN

## (undated) DEVICE — SUT VICRYL 3-0 27 SH

## (undated) DEVICE — SUT MCRYL PLUS 4-0 PS2 27IN

## (undated) DEVICE — STOCKINETTE IMPERVIOUS 9X44

## (undated) DEVICE — DRAPE NAVIGATOR GAMMA PROBE 098004

## (undated) DEVICE — SPONGE GAUZE 10S 4X4  442214

## (undated) DEVICE — ADHESIVE TISSUE EXOFIN

## (undated) DEVICE — STAPLER SKIN PROXIMATE WIDE

## (undated) DEVICE — ELECTRODE BLADE INSULATED 1 IN

## (undated) DEVICE — SUT PROLENE 5/0 RB-1 36 IN

## (undated) DEVICE — SUTURE VICRYL 2-0 CT-1 18 VCP839D

## (undated) DEVICE — GLOVE BIOGEL PI MICRO INDIC 8

## (undated) DEVICE — SYR 50ML CATH TIP

## (undated) DEVICE — CORD BIPOLAR 12 FOOT

## (undated) DEVICE — DRESSING TEGADERM 4X4 3/4 TD1004

## (undated) DEVICE — TOWEL OR BLUE      MDT2168284

## (undated) DEVICE — SUTURE VICRYL 3-0 SH 27 VCP416H

## (undated) DEVICE — SLING ARM LARGE BLUE

## (undated) DEVICE — ELECTRODE BLD 1 INCH TEFLON

## (undated) DEVICE — COVER MAYO STAND 89601

## (undated) DEVICE — PADDING CAST 4 STERILE 30-321

## (undated) DEVICE — STERISTRIP 1/2 R1547

## (undated) DEVICE — DRAIN ROUND 3/16 100CC  0073320